# Patient Record
Sex: MALE | Race: WHITE | NOT HISPANIC OR LATINO | Employment: OTHER | URBAN - METROPOLITAN AREA
[De-identification: names, ages, dates, MRNs, and addresses within clinical notes are randomized per-mention and may not be internally consistent; named-entity substitution may affect disease eponyms.]

---

## 2017-01-04 ENCOUNTER — TELEPHONE (OUTPATIENT)
Dept: HEMATOLOGY/ONCOLOGY | Facility: CLINIC | Age: 82
End: 2017-01-04

## 2017-01-05 ENCOUNTER — DOCUMENTATION ONLY (OUTPATIENT)
Dept: HEMATOLOGY/ONCOLOGY | Facility: CLINIC | Age: 82
End: 2017-01-05

## 2017-01-05 ENCOUNTER — TELEPHONE (OUTPATIENT)
Dept: HEMATOLOGY/ONCOLOGY | Facility: CLINIC | Age: 82
End: 2017-01-05

## 2017-01-05 DIAGNOSIS — C90.00 MULTIPLE MYELOMA NOT HAVING ACHIEVED REMISSION: ICD-10-CM

## 2017-01-05 RX ORDER — LENALIDOMIDE 10 MG/1
CAPSULE ORAL
Qty: 21 EACH | Refills: 0 | Status: SHIPPED | OUTPATIENT
Start: 2017-01-05 | End: 2017-02-02 | Stop reason: SDUPTHER

## 2017-01-05 NOTE — TELEPHONE ENCOUNTER
Spoke with wife and advised medication was [facility].  They will contact her to deliver.

## 2017-01-05 NOTE — PROGRESS NOTES
Rec'd Revlimid refill rx from Dr. Isabel. Cycle change noted from 21-day cycle to 28-day cycle. Refill faxed to [facility]. Will follow to ensure receipt of refill by pt.

## 2017-01-09 ENCOUNTER — TELEPHONE (OUTPATIENT)
Dept: HEMATOLOGY/ONCOLOGY | Facility: CLINIC | Age: 82
End: 2017-01-09

## 2017-01-09 DIAGNOSIS — C90.00 MULTIPLE MYELOMA NOT HAVING ACHIEVED REMISSION: Primary | ICD-10-CM

## 2017-01-09 RX ORDER — OXYCODONE AND ACETAMINOPHEN 10; 325 MG/1; MG/1
1 TABLET ORAL EVERY 6 HOURS PRN
Qty: 60 TABLET | Refills: 0 | Status: SHIPPED | OUTPATIENT
Start: 2017-01-09 | End: 2017-01-09 | Stop reason: SDUPTHER

## 2017-01-09 RX ORDER — OXYCODONE AND ACETAMINOPHEN 10; 325 MG/1; MG/1
1 TABLET ORAL EVERY 6 HOURS PRN
Qty: 20 TABLET | Refills: 0 | Status: CANCELLED | OUTPATIENT
Start: 2017-01-09

## 2017-01-09 RX ORDER — OXYCODONE AND ACETAMINOPHEN 10; 325 MG/1; MG/1
1 TABLET ORAL EVERY 6 HOURS PRN
Qty: 60 TABLET | Refills: 0 | Status: SHIPPED | OUTPATIENT
Start: 2017-01-09 | End: 2017-03-31 | Stop reason: SDUPTHER

## 2017-01-09 NOTE — TELEPHONE ENCOUNTER
Advised patient's wife.   checked with the pharmacy The prescripiton is still in process.  They will send it out as soon as it goest through

## 2017-01-09 NOTE — TELEPHONE ENCOUNTER
Spoke to Jade at [facility] regarding status of pt's Revlimid. It is being processed and they will call the patient to set up shipment soon. Notified Kenisha with Dr. Isabel, who is working with pt post [facility] d/c. Further f/u based on needs.

## 2017-01-09 NOTE — TELEPHONE ENCOUNTER
----- Message from Simi Spicer sent at 1/9/2017 12:24 PM CST -----  Елена Roe (Spouse) is returning a call from nurse in regards to pt medication.          Please call pt back at [phone]

## 2017-01-11 ENCOUNTER — TELEPHONE (OUTPATIENT)
Dept: HEMATOLOGY/ONCOLOGY | Facility: CLINIC | Age: 82
End: 2017-01-11

## 2017-01-11 NOTE — TELEPHONE ENCOUNTER
Attempted to contact patient/spouse regarding Revlimid.  Preferred number rang and then went to a busy tone.  Left message on secondary phone number, but have never used that number to contact patient or spouse before.  I will try calling the pharmacy to see if they can confirm that they have received the medicine.

## 2017-01-11 NOTE — TELEPHONE ENCOUNTER
Attempted to contact patient's wife to check status of revlimid.  Unable to leave a message or reach patient, I will try to call back later.

## 2017-01-11 NOTE — TELEPHONE ENCOUNTER
Spoke with rep for [facility] who states patient needs to contact pharmacy and complete survey and schedule delivery of Revlimid.  Notified Dr. Isabel and staff in case they are able to contact the patient/spouse before I can.  FU as requested.

## 2017-01-11 NOTE — TELEPHONE ENCOUNTER
----- Message from Zheng Isabel MD sent at 1/11/2017  3:42 PM CST -----  2 a day tops.  Also,  found out the revlimid has not been sent because he has not filled the   Survey  He needs to do that.  He should have already done it befoe. If he needs help, call justin ISABEL

## 2017-01-12 ENCOUNTER — TELEPHONE (OUTPATIENT)
Dept: HEMATOLOGY/ONCOLOGY | Facility: CLINIC | Age: 82
End: 2017-01-12

## 2017-01-12 NOTE — TELEPHONE ENCOUNTER
Call to Mrs. Roe regarding Revlimid Rx.  Patient needs to contact [facility] pharmacy at [phone] to complete survey and schedule delivery of Revlimid.  Questions that will be asked are if the patient has been reminded not to donate blood, not to have sex with a woman who can become pregnant, or if he does have sex with a woman who can become pregnant, he should use a latex condom, and if the patient understands that having sex with a woman who could become pregnant could result in birth defects or even death of an unborn baby.      Unable to reach by phone, I will continue to try, and relay this information to Didi Blake LCSW and Kenisha Conley MA

## 2017-01-12 NOTE — TELEPHONE ENCOUNTER
----- Message from Zulema Reillyite sent at 1/12/2017  4:19 PM CST -----  Contact: Елена Roe (wife)   Елена called and stated she was returning a call regarding a survey. She can be reached at [phone].    Thanks,  TF

## 2017-01-16 ENCOUNTER — OFFICE VISIT (OUTPATIENT)
Dept: HEMATOLOGY/ONCOLOGY | Facility: CLINIC | Age: 82
End: 2017-01-16
Payer: MEDICARE

## 2017-01-16 ENCOUNTER — DOCUMENTATION ONLY (OUTPATIENT)
Dept: HEMATOLOGY/ONCOLOGY | Facility: CLINIC | Age: 82
End: 2017-01-16

## 2017-01-16 ENCOUNTER — LAB VISIT (OUTPATIENT)
Dept: LAB | Facility: HOSPITAL | Age: 82
End: 2017-01-16
Attending: INTERNAL MEDICINE
Payer: MEDICARE

## 2017-01-16 VITALS
SYSTOLIC BLOOD PRESSURE: 120 MMHG | WEIGHT: 223.56 LBS | RESPIRATION RATE: 18 BRPM | HEIGHT: 68 IN | DIASTOLIC BLOOD PRESSURE: 62 MMHG | BODY MASS INDEX: 33.88 KG/M2 | OXYGEN SATURATION: 90 % | HEART RATE: 77 BPM | TEMPERATURE: 98 F

## 2017-01-16 DIAGNOSIS — C90.00 MULTIPLE MYELOMA, REMISSION STATUS UNSPECIFIED: ICD-10-CM

## 2017-01-16 DIAGNOSIS — C90.00 MULTIPLE MYELOMA, REMISSION STATUS UNSPECIFIED: Primary | ICD-10-CM

## 2017-01-16 LAB
ALBUMIN SERPL BCP-MCNC: 3.1 G/DL
ALP SERPL-CCNC: 70 U/L
ALT SERPL W/O P-5'-P-CCNC: 12 U/L
ANION GAP SERPL CALC-SCNC: 12 MMOL/L
AST SERPL-CCNC: 17 U/L
BASOPHILS # BLD AUTO: 0.04 K/UL
BASOPHILS NFR BLD: 0.5 %
BILIRUB SERPL-MCNC: 0.3 MG/DL
BUN SERPL-MCNC: 10 MG/DL
CALCIUM SERPL-MCNC: 9 MG/DL
CHLORIDE SERPL-SCNC: 99 MMOL/L
CO2 SERPL-SCNC: 28 MMOL/L
CREAT SERPL-MCNC: 1 MG/DL
DIFFERENTIAL METHOD: ABNORMAL
EOSINOPHIL # BLD AUTO: 0.2 K/UL
EOSINOPHIL NFR BLD: 2.3 %
ERYTHROCYTE [DISTWIDTH] IN BLOOD BY AUTOMATED COUNT: 17.1 %
EST. GFR  (AFRICAN AMERICAN): >60 ML/MIN/1.73 M^2
EST. GFR  (NON AFRICAN AMERICAN): >60 ML/MIN/1.73 M^2
GLUCOSE SERPL-MCNC: 131 MG/DL
HCT VFR BLD AUTO: 31.4 %
HGB BLD-MCNC: 9.5 G/DL
IGA SERPL-MCNC: 491 MG/DL
IGG SERPL-MCNC: 915 MG/DL
IGM SERPL-MCNC: 106 MG/DL
LYMPHOCYTES # BLD AUTO: 1.6 K/UL
LYMPHOCYTES NFR BLD: 20.8 %
MCH RBC QN AUTO: 32.1 PG
MCHC RBC AUTO-ENTMCNC: 30.3 %
MCV RBC AUTO: 106 FL
MONOCYTES # BLD AUTO: 1.1 K/UL
MONOCYTES NFR BLD: 14.9 %
NEUTROPHILS # BLD AUTO: 4.6 K/UL
NEUTROPHILS NFR BLD: 61.5 %
PLATELET # BLD AUTO: 211 K/UL
PMV BLD AUTO: 8.9 FL
POTASSIUM SERPL-SCNC: 3.8 MMOL/L
PROT SERPL-MCNC: 6.6 G/DL
RBC # BLD AUTO: 2.96 M/UL
SODIUM SERPL-SCNC: 139 MMOL/L
WBC # BLD AUTO: 7.54 K/UL

## 2017-01-16 PROCEDURE — 99499 UNLISTED E&M SERVICE: CPT | Mod: S$GLB,,, | Performed by: INTERNAL MEDICINE

## 2017-01-16 PROCEDURE — 99999 PR PBB SHADOW E&M-EST. PATIENT-LVL V: CPT | Mod: PBBFAC,,, | Performed by: INTERNAL MEDICINE

## 2017-01-16 PROCEDURE — 99215 OFFICE O/P EST HI 40 MIN: CPT | Mod: S$GLB,,, | Performed by: INTERNAL MEDICINE

## 2017-01-16 PROCEDURE — 82784 ASSAY IGA/IGD/IGG/IGM EACH: CPT | Mod: 59

## 2017-01-16 PROCEDURE — 86334 IMMUNOFIX E-PHORESIS SERUM: CPT

## 2017-01-16 PROCEDURE — 3074F SYST BP LT 130 MM HG: CPT | Mod: S$GLB,,, | Performed by: INTERNAL MEDICINE

## 2017-01-16 PROCEDURE — 1157F ADVNC CARE PLAN IN RCRD: CPT | Mod: S$GLB,,, | Performed by: INTERNAL MEDICINE

## 2017-01-16 PROCEDURE — 3078F DIAST BP <80 MM HG: CPT | Mod: S$GLB,,, | Performed by: INTERNAL MEDICINE

## 2017-01-16 PROCEDURE — 1160F RVW MEDS BY RX/DR IN RCRD: CPT | Mod: S$GLB,,, | Performed by: INTERNAL MEDICINE

## 2017-01-16 PROCEDURE — 86334 IMMUNOFIX E-PHORESIS SERUM: CPT | Mod: 26,,, | Performed by: PATHOLOGY

## 2017-01-16 PROCEDURE — 80053 COMPREHEN METABOLIC PANEL: CPT | Mod: PO

## 2017-01-16 PROCEDURE — 1159F MED LIST DOCD IN RCRD: CPT | Mod: S$GLB,,, | Performed by: INTERNAL MEDICINE

## 2017-01-16 PROCEDURE — 85025 COMPLETE CBC W/AUTO DIFF WBC: CPT | Mod: PO

## 2017-01-16 PROCEDURE — 1125F AMNT PAIN NOTED PAIN PRSNT: CPT | Mod: S$GLB,,, | Performed by: INTERNAL MEDICINE

## 2017-01-16 PROCEDURE — 84165 PROTEIN E-PHORESIS SERUM: CPT

## 2017-01-16 PROCEDURE — 36415 COLL VENOUS BLD VENIPUNCTURE: CPT | Mod: PO

## 2017-01-16 PROCEDURE — 84165 PROTEIN E-PHORESIS SERUM: CPT | Mod: 26,,, | Performed by: PATHOLOGY

## 2017-01-16 RX ORDER — NITROGLYCERIN 0.4 MG/1
0.4 TABLET SUBLINGUAL EVERY 5 MIN PRN
Qty: 30 TABLET | Refills: 3 | Status: SHIPPED | OUTPATIENT
Start: 2017-01-16 | End: 2017-05-01 | Stop reason: SDUPTHER

## 2017-01-16 NOTE — PROGRESS NOTES
Check in with patient/wife at office visit with Dr. Isabel.  Mrs. Roe asked for records from when the patient was hospitalized at [facility], specifically cardiology reports.  I was able to print off cardiac testing performed while inpatient, and printed out notes from pulmonology.  I was not able to find notes from cardiology in the file.  Patient reports he is feeling better.  FU as seen in clinic, as requested.

## 2017-01-16 NOTE — PROGRESS NOTES
Subjective:       Patient ID: Lorne Roe is a 81 y.o. male.    Chief Complaint: Follow-up    HPI This is a 81 year-old  gentleman who comes today for follow up of his smoldering myeloma.  He is who known to us since 11/2008 because of mild anemia. Work up at that time showed vitamin B12 in the lower limits of normal. He was given vitamin B12 supplements once a month with improvement. In addition, a serum protein electrophoresis was reported as positive showing an IgA monoclonal spike of 1.18 g. Eventually it sisi to 1.3 g. He had x-rays of the skull and other bones all of which were negative. His UPEP was negative for a monoclonal spike. His chem 20 as normal and has remained with normal calcium, protein and creatinine tthorugh the years. He had a bone marrow aspiration and biopsy on 05/03/2010 because of his increase in the spike of serum protein electrophoresis. 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    Serum free light chains in 07/2009 were reported as increased mostly at the expense of the lambda chains. There were measured at 7.13. We felt that based on the absence of lytic lesions, hypercalcemia, renal failure or significant anemia, he had at best a smoldering myeloma that we could follow expectantly. \He has chronic back pain of the spine    Imaging studies on 03/17/2011 ordered by orthopedics showed some disk disease and degenerative joint disease but no evidence of myeloma.    He has seen dr Carmichael of physical medicine A repeat MRI of the LS spine on 8/2014 showed DJD changes and no obvious evidence of myeloma.  Back pain continues about the same      He has chronic SOB for which he follows up with dr Szymanski and is on home 02.  His   lab test in October 2016 show anemia of  10.8  calcium is normal, total Serum protein is 10.  SPEp now show a monoclonal spike of 4.82 gr with lambda free light chains at 108.  24 hour urine protei nhas a small monoclonal  pspike represetning 12% of the protein in the urine  He was asked to have a bone marrow. He was diagnosed as having active myeloma.    He just came out of the hospital after a prolonged series ofback to back admissions to the [facility], [facility], and the [facility].  He has responded nicely to the initial treatment  And is supposed to start the 2 nd cycle of Rev/dex over the next few days.  [facility] on this [facility].  He remains SOb on around the clock 02  ALLERGIES: see med card  MEDICATIONS: See Med Card.  PAST MEDICAL HISTORY:  1. Smoldering multiple myeloma.  2. Obesity.  3. Chronic back pain with degenerative disk disease.  4. Coronary artery disease, status post bypass and coronary stents and  previous heart attacks.  5. High blood pressure.  6. Elevated cholesterol.  7. Peripheral vascular disease -- carotid stenosis.  8. History of leg edema.  9. Arthritis.  10.COPD  PREVIOUS SURGERIES: Coronary bypass surgery in 2003. Facial surgery  following an accident many years ago. Lower back surgery in 1998.  SOCIAL HISTORY: He is  with no children. He lives in [place].  He smoked until 1997 averaging two packs a day. He did this for 50 years.  He used to drink up to two cases of beer per week but stopped in 1995. He  used to be in the [employer] and retired from it and also worked as a riverboat  captain for several years.                Family History   Problem Relation Age of Onset    Heart disease Father       Melanoma Neg Hx       Psoriasis Neg Hx       Lupus Neg Hx       Eczema Neg Hx                Review of Systems   Constitutional: Negative.  Negative for fatigue.   Eyes: Negative.    Respiratory:        Chronic sob   Cardiovascular: Negative.  Negative for chest pain.   Gastrointestinal: Negative for abdominal pain and nausea.   Genitourinary: Negative.  Negative for hematuria.   Musculoskeletal: Negative.         Chronic back pain   Skin:  Negative.    Neurological: Negative.    Psychiatric/Behavioral: Negative.        Objective:      Physical Exam   Constitutional: He is oriented to person, place, and time. He appears well-developed and well-nourished.   Alert, oriented, comes in a wheel chair   HENT:   Head: Normocephalic.   Mouth/Throat: No oropharyngeal exudate.   Eyes: Pupils are equal, round, and reactive to light.   Neck: No thyromegaly present.   Cardiovascular: Normal rate, regular rhythm and normal heart sounds.  Exam reveals no gallop.    No murmur heard.  Pulmonary/Chest: No respiratory distress. He has no wheezes. He has no rales.   Abdominal: Soft. Bowel sounds are normal. He exhibits no distension and no mass. There is no rebound and no guarding.   Musculoskeletal: Normal range of motion. He exhibits no edema.   Lymphadenopathy:     He has no cervical adenopathy.   Neurological: He is alert and oriented to person, place, and time.   Skin: Skin is warm and dry.   Psychiatric: He has a normal mood and affect. His behavior is normal.       Assessment:       1. Multiple myeloma, remission status unspecified      2-COPD  3-CHF    Plan:       Will have a cbc, cmp, free light chains and SPEp.  Will let him know when to start the rev and dex.  Follow up with Dr Szymanski regarding the OB and cardiology regarding angina and chf  Complex visit requiring discussing case with [facility] specialists and addressing multiple mediacl issues

## 2017-01-17 ENCOUNTER — TELEPHONE (OUTPATIENT)
Dept: PULMONOLOGY | Facility: CLINIC | Age: 82
End: 2017-01-17

## 2017-01-17 DIAGNOSIS — R06.02 SOB (SHORTNESS OF BREATH): Primary | ICD-10-CM

## 2017-01-17 LAB
ALBUMIN SERPL ELPH-MCNC: 3.2 G/DL
ALPHA1 GLOB SERPL ELPH-MCNC: 0.36 G/DL
ALPHA2 GLOB SERPL ELPH-MCNC: 0.89 G/DL
B-GLOBULIN SERPL ELPH-MCNC: 1.16 G/DL
GAMMA GLOB SERPL ELPH-MCNC: 0.89 G/DL
PATHOLOGIST INTERPRETATION SPE: NORMAL
PROT SERPL-MCNC: 6.5 G/DL

## 2017-01-17 NOTE — TELEPHONE ENCOUNTER
Spoke with patient's wife Елена, who states that patient is ok now.  Oxygen saturation levels were low.  Unsure if they were correct.   Offered appointment for tomorrow in am to which she declined due to other appointments.  Oxygen levels are now 96% with bipap on.  Will come in on 1/23/17 for appointment.

## 2017-01-17 NOTE — TELEPHONE ENCOUNTER
----- Message from Sandy Phillip sent at 1/17/2017 11:09 AM CST -----  Contact: Елена/wife  Елена stated that Home Health advise her to call in to let Dr. Szymanski know what the patient is doing, She stated that when he woke up this morning he was not able to breathe so she put him back to bed to put him back under his CPAP. She also stated that when he was on his CPAP his oxygen was 84 and without it was 45, Please call her at [phone].      Thanks  Td

## 2017-01-18 LAB — INTERPRETATION SERPL IFE-IMP: NORMAL

## 2017-01-18 NOTE — TELEPHONE ENCOUNTER
Done  The orders you are now placing are duplicates of the following open orders. Consider releasing an open order instead.  XR CHEST PA AND LATERAL [[id number]] is a future order, expected 10/5/17, placed by Shayan Szymanski MD on 10/5/16  Click here to update recipient lists

## 2017-01-19 ENCOUNTER — TELEPHONE (OUTPATIENT)
Dept: HEMATOLOGY/ONCOLOGY | Facility: CLINIC | Age: 82
End: 2017-01-19

## 2017-01-19 DIAGNOSIS — C90.00 MULTIPLE MYELOMA, REMISSION STATUS UNSPECIFIED: Primary | ICD-10-CM

## 2017-01-19 RX ORDER — DEXAMETHASONE 4 MG/1
TABLET ORAL
Qty: 40 TABLET | Refills: 6 | Status: SHIPPED | OUTPATIENT
Start: 2017-01-19 | End: 2017-01-19 | Stop reason: SDUPTHER

## 2017-01-19 RX ORDER — DEXAMETHASONE 4 MG/1
TABLET ORAL
Qty: 40 TABLET | Refills: 6 | Status: SHIPPED | OUTPATIENT
Start: 2017-01-19 | End: 2017-03-01 | Stop reason: SDUPTHER

## 2017-01-19 NOTE — TELEPHONE ENCOUNTER
----- Message from Michell Fisher sent at 1/19/2017 10:06 AM CST -----  Contact: pt   .Pt was returning nurse call     ..[phone] (home) [phone] (work)

## 2017-01-19 NOTE — TELEPHONE ENCOUNTER
Send prescripiton to [facility]  [facility].  Do you need blood work?

## 2017-01-20 ENCOUNTER — DOCUMENTATION ONLY (OUTPATIENT)
Dept: HEMATOLOGY/ONCOLOGY | Facility: CLINIC | Age: 82
End: 2017-01-20

## 2017-01-20 NOTE — PROGRESS NOTES
Mrs Roe called today saying he had developed swelling of the leg, with redness and blisters. She was asked to take patient to the ER at [facility]

## 2017-01-20 NOTE — TELEPHONE ENCOUNTER
Wife called and said patient's leg was swollen with blister and warm to touch.  Dr Isabel spoke with wife.  Patient is to go to ER.

## 2017-01-20 NOTE — TELEPHONE ENCOUNTER
----- Message from Sheron Blake sent at 1/20/2017  7:59 AM CST -----  Contact: Елена/wife  Елена called to speak with the nurse; she stated that the patient's legs are swollen and she thinks he has an infection (she stated he had it in the hospital) and she would like an antibiotic called in for him. - she stated that Dr. Isabel knows the name of the medication.    [facility] [address].  [address] [place], [place] [address].  [address]  [facility] [address]  Phone: [phone] Fax: [phone]    She can be contacted at [phone].    Thanks,  Sheron

## 2017-01-24 LAB — PATHOLOGIST INTERPRETATION IFE: NORMAL

## 2017-01-25 ENCOUNTER — HOSPITAL ENCOUNTER (OUTPATIENT)
Dept: RADIOLOGY | Facility: HOSPITAL | Age: 82
Discharge: HOME | End: 2017-01-25
Attending: INTERNAL MEDICINE
Payer: MEDICARE

## 2017-01-25 ENCOUNTER — OFFICE VISIT (OUTPATIENT)
Dept: PULMONOLOGY | Facility: CLINIC | Age: 82
End: 2017-01-25
Payer: MEDICARE

## 2017-01-25 VITALS
OXYGEN SATURATION: 93 % | WEIGHT: 224.19 LBS | BODY MASS INDEX: 33.98 KG/M2 | SYSTOLIC BLOOD PRESSURE: 104 MMHG | RESPIRATION RATE: 20 BRPM | DIASTOLIC BLOOD PRESSURE: 54 MMHG | HEIGHT: 68 IN | HEART RATE: 75 BPM

## 2017-01-25 DIAGNOSIS — F32.A ANXIETY AND DEPRESSION: ICD-10-CM

## 2017-01-25 DIAGNOSIS — N19 RENAL FAILURE SYNDROME: ICD-10-CM

## 2017-01-25 DIAGNOSIS — J44.9 CHRONIC OBSTRUCTIVE PULMONARY DISEASE, UNSPECIFIED COPD TYPE: ICD-10-CM

## 2017-01-25 DIAGNOSIS — R60.0 LOCALIZED EDEMA: Primary | ICD-10-CM

## 2017-01-25 DIAGNOSIS — R06.02 SOB (SHORTNESS OF BREATH): ICD-10-CM

## 2017-01-25 DIAGNOSIS — R21 RASH: ICD-10-CM

## 2017-01-25 DIAGNOSIS — F41.9 ANXIETY AND DEPRESSION: ICD-10-CM

## 2017-01-25 PROCEDURE — 99215 OFFICE O/P EST HI 40 MIN: CPT | Mod: S$GLB,,, | Performed by: INTERNAL MEDICINE

## 2017-01-25 PROCEDURE — 71020 XR CHEST PA AND LATERAL: CPT | Mod: 26,,, | Performed by: RADIOLOGY

## 2017-01-25 PROCEDURE — 1125F AMNT PAIN NOTED PAIN PRSNT: CPT | Mod: S$GLB,,, | Performed by: INTERNAL MEDICINE

## 2017-01-25 PROCEDURE — 1159F MED LIST DOCD IN RCRD: CPT | Mod: S$GLB,,, | Performed by: INTERNAL MEDICINE

## 2017-01-25 PROCEDURE — 99499 UNLISTED E&M SERVICE: CPT | Mod: S$GLB,,, | Performed by: INTERNAL MEDICINE

## 2017-01-25 PROCEDURE — 99999 PR PBB SHADOW E&M-EST. PATIENT-LVL IV: CPT | Mod: PBBFAC,,, | Performed by: INTERNAL MEDICINE

## 2017-01-25 PROCEDURE — 3078F DIAST BP <80 MM HG: CPT | Mod: S$GLB,,, | Performed by: INTERNAL MEDICINE

## 2017-01-25 PROCEDURE — 1157F ADVNC CARE PLAN IN RCRD: CPT | Mod: S$GLB,,, | Performed by: INTERNAL MEDICINE

## 2017-01-25 PROCEDURE — 3074F SYST BP LT 130 MM HG: CPT | Mod: S$GLB,,, | Performed by: INTERNAL MEDICINE

## 2017-01-25 PROCEDURE — 1160F RVW MEDS BY RX/DR IN RCRD: CPT | Mod: S$GLB,,, | Performed by: INTERNAL MEDICINE

## 2017-01-25 RX ORDER — IPRATROPIUM BROMIDE AND ALBUTEROL SULFATE 2.5; .5 MG/3ML; MG/3ML
3 SOLUTION RESPIRATORY (INHALATION)
Qty: 360 VIAL | Refills: 3 | Status: SHIPPED | OUTPATIENT
Start: 2017-01-25 | End: 2017-10-26 | Stop reason: SDUPTHER

## 2017-01-25 RX ORDER — IPRATROPIUM BROMIDE 42 UG/1
2 SPRAY, METERED NASAL
COMMUNITY
End: 2017-02-02

## 2017-01-25 RX ORDER — SPIRONOLACTONE 50 MG/1
50 TABLET, FILM COATED ORAL 2 TIMES DAILY
Qty: 180 TABLET | Refills: 4 | Status: SHIPPED | OUTPATIENT
Start: 2017-01-25 | End: 2017-02-02

## 2017-01-25 RX ORDER — PROMETHAZINE HYDROCHLORIDE AND DEXTROMETHORPHAN HYDROBROMIDE 6.25; 15 MG/5ML; MG/5ML
SYRUP ORAL
COMMUNITY
End: 2017-05-01 | Stop reason: SDUPTHER

## 2017-01-25 RX ORDER — ISOSORBIDE MONONITRATE 30 MG/1
30 TABLET, EXTENDED RELEASE ORAL
COMMUNITY
End: 2017-03-17

## 2017-01-25 RX ORDER — FLUTICASONE PROPIONATE AND SALMETEROL 250; 50 UG/1; UG/1
1 POWDER RESPIRATORY (INHALATION) 2 TIMES DAILY
Qty: 180 EACH | Refills: 3 | Status: SHIPPED | OUTPATIENT
Start: 2017-01-25 | End: 2018-03-21 | Stop reason: ALTCHOICE

## 2017-01-25 RX ORDER — MELOXICAM 7.5 MG/1
7.5 TABLET ORAL
COMMUNITY
End: 2018-03-21 | Stop reason: ALTCHOICE

## 2017-01-25 RX ORDER — HYDROCODONE BITARTRATE AND ACETAMINOPHEN 10; 325 MG/1; MG/1
1 TABLET ORAL
COMMUNITY
End: 2017-02-02

## 2017-01-25 RX ORDER — BUPROPION HYDROCHLORIDE 75 MG/1
75 TABLET ORAL DAILY
Qty: 90 TABLET | Refills: 3 | Status: SHIPPED | OUTPATIENT
Start: 2017-01-25 | End: 2018-01-25

## 2017-01-25 RX ORDER — HYDROXYZINE HYDROCHLORIDE 10 MG/1
10 TABLET, FILM COATED ORAL
COMMUNITY
End: 2017-01-25 | Stop reason: SDUPTHER

## 2017-01-25 RX ORDER — CLONAZEPAM 0.25 MG/1
0.25 TABLET, ORALLY DISINTEGRATING ORAL 2 TIMES DAILY
Qty: 180 TABLET | Refills: 3 | Status: SHIPPED | OUTPATIENT
Start: 2017-01-25 | End: 2017-03-17 | Stop reason: DRUGHIGH

## 2017-01-25 RX ORDER — HYDROXYZINE HYDROCHLORIDE 10 MG/1
10 TABLET, FILM COATED ORAL 3 TIMES DAILY PRN
Qty: 100 TABLET | Refills: 11 | Status: SHIPPED | OUTPATIENT
Start: 2017-01-25 | End: 2017-03-17

## 2017-01-25 RX ORDER — FUROSEMIDE 40 MG/1
40 TABLET ORAL 2 TIMES DAILY
Qty: 180 TABLET | Refills: 4 | Status: SHIPPED | OUTPATIENT
Start: 2017-01-25 | End: 2018-03-21 | Stop reason: SDUPTHER

## 2017-01-25 RX ORDER — GABAPENTIN 600 MG/1
600 TABLET ORAL
COMMUNITY
End: 2017-01-25

## 2017-01-25 RX ORDER — IPRATROPIUM BROMIDE AND ALBUTEROL SULFATE 2.5; .5 MG/3ML; MG/3ML
3 SOLUTION RESPIRATORY (INHALATION)
COMMUNITY
End: 2017-01-25 | Stop reason: SDUPTHER

## 2017-01-25 RX ORDER — ALBUTEROL SULFATE 90 UG/1
2 AEROSOL, METERED RESPIRATORY (INHALATION) EVERY 6 HOURS PRN
Qty: 3 INHALER | Refills: 3 | Status: SHIPPED | OUTPATIENT
Start: 2017-01-25 | End: 2018-03-21 | Stop reason: SDUPTHER

## 2017-01-25 RX ORDER — SPIRONOLACTONE 50 MG/1
50 TABLET, FILM COATED ORAL
COMMUNITY
End: 2017-01-25 | Stop reason: SDUPTHER

## 2017-01-25 NOTE — PROGRESS NOTES
Subjective:       Patient ID: Lorne Roe is a 82 y.o. male.    Chief Complaint: He       COPD    HPI     Overall ilmproved since last visit  Less sputum p[roduction  Legs swelling up intermitteltly  Trouble breathing when exercising      COPD  He presents for evaluation and treatment of COPD. The patient is not currently have symptoms / an exacerbation. The patient has COPD for approximately 10 years. Symptoms in previous episodes have included dyspnea, cough, wheezing and fatigue, and typically last 2 weeks. Previous episodes have been exacerbated by any exercise. Current treatment includes albuterol nebulizer, which generally provides some relief of symptoms.  He uses 1 pillows at night. Patient currently is on oxygen at 3 L/min per nasal cannula.. The patient is having no constitutional symptoms, denying fever, chills, anorexia, or weight loss. The patient has been hospitalized for this condition before. He quit smoking approximately years years ago. The patient is experiencing exercise intolerance (difficulty walking 2 blocks on flat ground and difficulty climbing 2 flights of stairs).    Obstructive Sleep Apnea:  Patient is using CPAP as prescribed and benefiting from therapy.    Patient has complaints of none    Seen by Dr. Cristina in hospital      Past Medical History   Diagnosis Date    B12 deficiency anemia     COPD (chronic obstructive pulmonary disease)     Coronary artery disease      [facility]    Depression     Hypertension     Multiple myeloma     Osteopenia     Paraproteinemia      Dr alvarado    PVD (peripheral vascular disease)      Dr Argueta    Vitamin D deficiency disease      Past Surgical History   Procedure Laterality Date    Back surgery      Neck surgery      Coronary artery bypass graft       stent/eren multi-link duet-safe to 1.5T    Coronary angioplasty with stent placement      Carotid endarterectomy Left 12/17/13      left     Social History     Social History    Marital status:      Spouse name: N/A    Number of children: 0    Years of education: N/A     Occupational History    Not on file.     Social History Main Topics    Smoking status: Former Smoker    Smokeless tobacco: Never Used    Alcohol use No    Drug use: No    Sexual activity: No     Other Topics Concern    Not on file     Social History Narrative    Lives with spouse at home, disabled since 2003     Review of Systems   Constitutional: Positive for fatigue. Negative for fever.   HENT: Positive for postnasal drip and rhinorrhea. Negative for congestion.    Respiratory: Positive for cough, sputum production, shortness of breath, dyspnea on extertion, use of rescue inhaler and Paroxysmal Nocturnal Dyspnea.    Cardiovascular: Positive for palpitations and leg swelling. Negative for chest pain.   Musculoskeletal: Positive for arthralgias and back pain.   Skin: Negative for rash.   Gastrointestinal: Negative for nausea and abdominal pain.   Neurological: Positive for weakness. Negative for dizziness, syncope and light-headedness.   Hematological: Negative for adenopathy. Does not bruise/bleed easily.   Psychiatric/Behavioral: Positive for sleep disturbance. The patient is nervous/anxious.        Objective:      Physical Exam   Constitutional: He is oriented to person, place, and time. He appears well-developed and well-nourished.   HENT:   Head: Normocephalic and atraumatic.   Mouth/Throat: Oropharyngeal exudate present.   Eyes: Conjunctivae are normal. Pupils are equal, round, and reactive to light.   Neck: Neck supple. No JVD present. No tracheal deviation present. No thyromegaly present.   Cardiovascular: Normal rate.  A regularly irregular rhythm present.   Murmur heard.   Systolic murmur is present with a grade of 2/6   Pulmonary/Chest: Effort normal. He has decreased breath sounds. He has wheezes in the right lower field and the left lower field. He  has no rhonchi. He has no rales.   Abdominal: Soft. Bowel sounds are normal.   Musculoskeletal: He exhibits no edema or tenderness.   Decreased range of motion of back.     Lymphadenopathy:     He has no cervical adenopathy.   Neurological: He is alert and oriented to person, place, and time. He displays abnormal reflex.   Skin: Skin is warm and dry.   Nursing note and vitals reviewed.    Personal Diagnostic Review  Chest x-ray: cardiomegaly    No flowsheet data found.      Assessment:       1. Localized edema    2. Chronic obstructive pulmonary disease, unspecified COPD type    3. Anxiety and depression    4. Rash    5. Renal failure syndrome        Outpatient Encounter Prescriptions as of 1/25/2017   Medication Sig Dispense Refill    acyclovir (ZOVIRAX) 400 MG tablet Take 1 tablet (400 mg total) by mouth 2 (two) times daily. 60 tablet 0    albuterol 90 mcg/actuation inhaler Inhale 2 puffs into the lungs every 6 (six) hours as needed for Shortness of Breath. 3 Inhaler 3    albuterol-ipratropium 2.5mg-0.5mg/3mL (DUO-NEB) 0.5 mg-3 mg(2.5 mg base)/3 mL nebulizer solution Take 3 mLs by nebulization every 6 (six) hours while awake. 360 vial 3    ammonium lactate (AMLACTIN) 12 % lotion Use daily.  Apply to damp skin after bathing. 225 g 11    aspirin (ECOTRIN) 81 MG EC tablet Take 1 tablet (81 mg total) by mouth once daily. 30 tablet 0    atenolol (TENORMIN) 25 MG tablet Take 0.5 tablets (12.5 mg total) by mouth once daily. (Patient taking differently: Take 50 mg by mouth once daily. ) 15 tablet 0    buPROPion (WELLBUTRIN) 75 MG tablet Take 1 tablet (75 mg total) by mouth once daily. 90 tablet 3    clonazePAM (KLONOPIN) 0.25 MG TbDL Take 1 tablet (0.25 mg total) by mouth 2 (two) times daily. 180 tablet 3    clopidogrel (PLAVIX) 75 mg tablet Take 1 tablet (75 mg total) by mouth once daily. 30 tablet 0    clotrimazole (LOTRIMIN) 1 % cream as needed.   0    cyanocobalamin 1,000 mcg/mL injection Inject 1 mL (1,000  mcg total) into the muscle every 30 days. 10 mL 1    dexamethasone (DECADRON) 4 MG Tab 5 tablets po bid one day a week every week 40 tablet 6    docusate sodium (COLACE) 100 MG capsule Take 1 capsule (100 mg total) by mouth 2 (two) times daily. 60 capsule 0    ergocalciferol (VITAMIN D2) 50,000 unit Cap TAKE 1 CAPSULE EVERY 7 DAYS 4 capsule 0    fluticasone (FLONASE) 50 mcg/actuation nasal spray 1 spray by Each Nare route once daily. 1 Bottle 0    fluticasone-salmeterol 250-50 mcg/dose (ADVAIR DISKUS) 250-50 mcg/dose diskus inhaler Inhale 1 puff into the lungs 2 (two) times daily. 180 each 3    furosemide (LASIX) 40 MG tablet Take 1 tablet (40 mg total) by mouth 2 (two) times daily. 180 tablet 4    hydrocodone-acetaminophen 10-325mg (NORCO)  mg Tab Take 1 tablet by mouth.      hydrOXYzine HCl (ATARAX) 10 MG Tab Take 1 tablet (10 mg total) by mouth 3 (three) times daily as needed. 100 tablet 11    ipratropium (ATROVENT) 0.06 % nasal spray 2 sprays by Nasal route.      isosorbide mononitrate (IMDUR) 30 MG 24 hr tablet Take 30 mg by mouth.      lenalidomide (REVLIMID) 10 mg Cap One tablet daily from day 1 to day 21 then 7 days off 21 each 0    levothyroxine (SYNTHROID) 25 MCG tablet Take 1 tablet (25 mcg total) by mouth once daily. 30 tablet 0    magnesium oxide (MAG-OX) 400 mg tablet Take 1 tablet (400 mg total) by mouth 2 (two) times daily. 60 tablet 0    meloxicam (MOBIC) 7.5 MG tablet Take 7.5 mg by mouth.      midodrine (PROAMATINE) 2.5 MG Tab Take 2 tablets (5 mg total) by mouth 2 (two) times daily with meals. 60 tablet 0    nitroGLYCERIN (NITROSTAT) 0.4 MG SL tablet Place 1 tablet (0.4 mg total) under the tongue every 5 (five) minutes as needed. 30 tablet 3    nystatin (MYCOSTATIN) cream Apply topically 2 (two) times daily. 30 g 0    nystatin (MYCOSTATIN) powder Apply topically 2 (two) times daily as needed. 30 g 0    ondansetron (ZOFRAN) 4 MG tablet Take 1 tablet (4 mg total) by mouth  every 8 (eight) hours as needed for Nausea. 20 tablet 0    oxycodone-acetaminophen (PERCOCET)  mg per tablet Take 1 tablet by mouth every 6 (six) hours as needed for Pain. 60 tablet 0    pantoprazole (PROTONIX) 40 MG tablet Take 1 tablet (40 mg total) by mouth once daily. 30 tablet 0    promethazine-dextromethorphan (PROMETHAZINE-DM) 6.25-15 mg/5 mL Syrp Take by mouth.      rosuvastatin (CRESTOR) 10 MG tablet Take 1 tablet (10 mg total) by mouth every evening. 30 tablet 0    spironolactone (ALDACTONE) 50 MG tablet Take 1 tablet (50 mg total) by mouth 2 (two) times daily. 180 tablet 4    tamsulosin (FLOMAX) 0.4 mg Cp24 Take 1 capsule (0.4 mg total) by mouth once daily. 30 capsule 0    [DISCONTINUED] albuterol 90 mcg/actuation inhaler Inhale 2 puffs into the lungs every 6 (six) hours as needed for Shortness of Breath. 1 Inhaler 0    [DISCONTINUED] albuterol-ipratropium 2.5mg-0.5mg/3mL (DUO-NEB) 0.5 mg-3 mg(2.5 mg base)/3 mL nebulizer solution 3 mLs.      [DISCONTINUED] buPROPion (WELLBUTRIN) 75 MG tablet Take 1 tablet (75 mg total) by mouth once daily. 30 tablet 0    [DISCONTINUED] clonazePAM (KLONOPIN) 0.25 MG TbDL Take 1 tablet (0.25 mg total) by mouth 2 (two) times daily. 30 tablet 0    [DISCONTINUED] fluticasone-salmeterol 250-50 mcg/dose (ADVAIR DISKUS) 250-50 mcg/dose diskus inhaler Inhale 1 puff into the lungs 2 (two) times daily. 1 each 0    [DISCONTINUED] furosemide (LASIX) 40 MG tablet Take 1 tablet (40 mg total) by mouth once daily. 30 tablet 0    [DISCONTINUED] hydrOXYzine HCl (ATARAX) 10 MG Tab Take 10 mg by mouth.      [DISCONTINUED] spironolactone (ALDACTONE) 50 MG tablet Take 50 mg by mouth.      [DISCONTINUED] gabapentin (NEURONTIN) 600 MG tablet Take 600 mg by mouth.       No facility-administered encounter medications on file as of 1/25/2017.      Orders Placed This Encounter   Procedures    X-Ray Chest PA And Lateral     Standing Status:   Future     Standing Expiration Date:    1/25/2019     Order Specific Question:   Reason for Exam:     Answer:   SOB    Ambulatory Referral to Nephrology     Referral Priority:   Routine     Referral Type:   Consultation     Referral Reason:   Specialty Services Required     Requested Specialty:   Nephrology     Number of Visits Requested:   1     Plan:       Requested Prescriptions     Signed Prescriptions Disp Refills    furosemide (LASIX) 40 MG tablet 180 tablet 4     Sig: Take 1 tablet (40 mg total) by mouth 2 (two) times daily.    fluticasone-salmeterol 250-50 mcg/dose (ADVAIR DISKUS) 250-50 mcg/dose diskus inhaler 180 each 3     Sig: Inhale 1 puff into the lungs 2 (two) times daily.    spironolactone (ALDACTONE) 50 MG tablet 180 tablet 4     Sig: Take 1 tablet (50 mg total) by mouth 2 (two) times daily.    albuterol-ipratropium 2.5mg-0.5mg/3mL (DUO-NEB) 0.5 mg-3 mg(2.5 mg base)/3 mL nebulizer solution 360 vial 3     Sig: Take 3 mLs by nebulization every 6 (six) hours while awake.    albuterol 90 mcg/actuation inhaler 3 Inhaler 3     Sig: Inhale 2 puffs into the lungs every 6 (six) hours as needed for Shortness of Breath.    buPROPion (WELLBUTRIN) 75 MG tablet 90 tablet 3     Sig: Take 1 tablet (75 mg total) by mouth once daily.    clonazePAM (KLONOPIN) 0.25 MG TbDL 180 tablet 3     Sig: Take 1 tablet (0.25 mg total) by mouth 2 (two) times daily.    hydrOXYzine HCl (ATARAX) 10 MG Tab 100 tablet 11     Sig: Take 1 tablet (10 mg total) by mouth 3 (three) times daily as needed.     Localized edema  -     furosemide (LASIX) 40 MG tablet; Take 1 tablet (40 mg total) by mouth 2 (two) times daily.  Dispense: 180 tablet; Refill: 4  -     spironolactone (ALDACTONE) 50 MG tablet; Take 1 tablet (50 mg total) by mouth 2 (two) times daily.  Dispense: 180 tablet; Refill: 4    Chronic obstructive pulmonary disease, unspecified COPD type  -     fluticasone-salmeterol 250-50 mcg/dose (ADVAIR DISKUS) 250-50 mcg/dose diskus inhaler; Inhale 1 puff into the  lungs 2 (two) times daily.  Dispense: 180 each; Refill: 3  -     albuterol-ipratropium 2.5mg-0.5mg/3mL (DUO-NEB) 0.5 mg-3 mg(2.5 mg base)/3 mL nebulizer solution; Take 3 mLs by nebulization every 6 (six) hours while awake.  Dispense: 360 vial; Refill: 3  -     albuterol 90 mcg/actuation inhaler; Inhale 2 puffs into the lungs every 6 (six) hours as needed for Shortness of Breath.  Dispense: 3 Inhaler; Refill: 3  -     X-Ray Chest PA And Lateral; Future    Anxiety and depression  -     buPROPion (WELLBUTRIN) 75 MG tablet; Take 1 tablet (75 mg total) by mouth once daily.  Dispense: 90 tablet; Refill: 3  -     clonazePAM (KLONOPIN) 0.25 MG TbDL; Take 1 tablet (0.25 mg total) by mouth 2 (two) times daily.  Dispense: 180 tablet; Refill: 3    Rash  -     hydrOXYzine HCl (ATARAX) 10 MG Tab; Take 1 tablet (10 mg total) by mouth 3 (three) times daily as needed.  Dispense: 100 tablet; Refill: 11    Renal failure syndrome  -     Ambulatory Referral to Nephrology         Return in about 6 months (around 7/25/2017) for cxr.    MEDICAL DECISION MAKING: Moderate to high complexity.  Overall, the multiple problems listed are of moderate to high severity that may impact quality of life and activities of daily living. Side effects of medications, treatment plan as well as options and alternatives reviewed and discussed with patient. There was counseling of patient concerning these issues.    Total time spent in face to face counseling and coordination of care - 40  minutes over 50% of time was used in discussion of prognosis, risks, benefits of treatment, instructions and compliance with regimen . Discussion with other physicians or health care providers (DME, NP, pharmacy, respiratory therapy) occurred.

## 2017-01-25 NOTE — MR AVS SNAPSHOT
[facility]  [address] [place] [address]  Phone: [phone]  Fax: [phone]                  Lorne Roe   2017 11:00 AM   Office Visit    Description:  Male : 1935   Provider:  Shayan Szymanski MD   Department:  [facility]           Reason for Visit     COPD           Diagnoses this Visit        Comments    Localized edema    -  Primary     Chronic obstructive pulmonary disease, unspecified COPD type         Anxiety and depression         Rash         Renal failure syndrome                To Do List           Future Appointments        Provider Department Dept Phone    2017 11:05 AM LABORATORY, [facility] [phone]    2017 11:20 AM Zheng Isabel MD [facility] Hematology Oncology [phone]    4/10/2017 10:20 AM PULMONARY LAB, [facility]- Pulmonary Function [facility] [phone]    4/10/2017 11:40 AM Shayan Szymanski MD [facility] [phone]      Goals (5 Years of Data)     None      Follow-Up and Disposition     Return in about 6 months (around 2017) for cxr.       These Medications        Disp Refills Start End    furosemide (LASIX) 40 MG tablet 180 tablet 4 2017     Take 1 tablet (40 mg total) by mouth 2 (two) times daily. - Oral    Pharmacy: [address] Ph #: [phone]       fluticasone-salmeterol 250-50 mcg/dose (ADVAIR DISKUS) 250-50 mcg/dose diskus inhaler 180 each 3 2017    Inhale 1 puff into the lungs 2 (two) times daily. - Inhalation    Pharmacy: [facility] [address] Ph #: [phone]       spironolactone (ALDACTONE) 50 MG tablet 180 tablet 4 2017     Take 1 tablet (50 mg total) by mouth 2 (two) times daily. - Oral    Pharmacy: [facility] [address] Ph #: [phone]       albuterol-ipratropium 2.5mg-0.5mg/3mL (DUO-NEB) 0.5 mg-3 mg(2.5 mg base)/3  mL nebulizer solution 360 vial 3 1/25/2017     Take 3 mLs by nebulization every 6 (six) hours while awake. - Nebulization    Pharmacy: [address] Ph #: [phone]       albuterol 90 mcg/actuation inhaler 3 Inhaler 3 1/25/2017     Inhale 2 puffs into the lungs every 6 (six) hours as needed for Shortness of Breath. - Inhalation    Pharmacy: [address] Ph #: [phone]       buPROPion (WELLBUTRIN) 75 MG tablet 90 tablet 3 1/25/2017 1/25/2018    Take 1 tablet (75 mg total) by mouth once daily. - Oral    Pharmacy: [address] Ph #: [phone]       clonazePAM (KLONOPIN) 0.25 MG TbDL 180 tablet 3 1/25/2017 1/25/2018    Take 1 tablet (0.25 mg total) by mouth 2 (two) times daily. - Oral    Pharmacy: [address] Ph #: [phone]       hydrOXYzine HCl (ATARAX) 10 MG Tab 100 tablet 11 1/25/2017     Take 1 tablet (10 mg total) by mouth 3 (three) times daily as needed. - Oral    Pharmacy: [address] Ph #: [phone]         [facility]     [facility] Line - 24/7 Assistance  Registered nurses in the [facility] provide clinical advisement, health education, appointment booking, and other advisory services.  Call for this free service at [phone].             Medications           Message regarding Medications     Verify the changes and/or additions to your medication regime listed below are the same as discussed with your clinician today.  If any of these changes or additions are incorrect, please notify your healthcare provider.        START taking these NEW medications        Refills    spironolactone (ALDACTONE) 50 MG tablet 4    Sig: Take 1 tablet (50 mg total) by mouth 2 (two) times daily.    Class: Print    Route: Oral    albuterol-ipratropium 2.5mg-0.5mg/3mL (DUO-NEB) 0.5 mg-3  mg(2.5 mg base)/3 mL nebulizer solution 3    Sig: Take 3 mLs by nebulization every 6 (six) hours while awake.    Class: Print    Route: Nebulization    hydrOXYzine HCl (ATARAX) 10 MG Tab 11    Sig: Take 1 tablet (10 mg total) by mouth 3 (three) times daily as needed.    Class: Print    Route: Oral      CHANGE how you are taking these medications     Start Taking Instead of    furosemide (LASIX) 40 MG tablet furosemide (LASIX) 40 MG tablet    Dosage:  Take 1 tablet (40 mg total) by mouth 2 (two) times daily. Dosage:  Take 1 tablet (40 mg total) by mouth once daily.    Reason for Change:  Reorder       STOP taking these medications     gabapentin (NEURONTIN) 600 MG tablet Take 600 mg by mouth.           Verify that the below list of medications is an accurate representation of the medications you are currently taking.  If none reported, the list may be blank. If incorrect, please contact your healthcare provider. Carry this list with you in case of emergency.           Current Medications     acyclovir (ZOVIRAX) 400 MG tablet Take 1 tablet (400 mg total) by mouth 2 (two) times daily.    albuterol 90 mcg/actuation inhaler Inhale 2 puffs into the lungs every 6 (six) hours as needed for Shortness of Breath.    albuterol-ipratropium 2.5mg-0.5mg/3mL (DUO-NEB) 0.5 mg-3 mg(2.5 mg base)/3 mL nebulizer solution Take 3 mLs by nebulization every 6 (six) hours while awake.    ammonium lactate (AMLACTIN) 12 % lotion Use daily.  Apply to damp skin after bathing.    aspirin (ECOTRIN) 81 MG EC tablet Take 1 tablet (81 mg total) by mouth once daily.    atenolol (TENORMIN) 25 MG tablet Take 0.5 tablets (12.5 mg total) by mouth once daily.    buPROPion (WELLBUTRIN) 75 MG tablet Take 1 tablet (75 mg total) by mouth once daily.    clonazePAM (KLONOPIN) 0.25 MG TbDL Take 1 tablet (0.25 mg total) by mouth 2 (two) times daily.    clopidogrel (PLAVIX) 75 mg tablet Take 1 tablet (75 mg total) by mouth once daily.    clotrimazole (LOTRIMIN) 1 %  cream as needed.     cyanocobalamin 1,000 mcg/mL injection Inject 1 mL (1,000 mcg total) into the muscle every 30 days.    dexamethasone (DECADRON) 4 MG Tab 5 tablets po bid one day a week every week    docusate sodium (COLACE) 100 MG capsule Take 1 capsule (100 mg total) by mouth 2 (two) times daily.    ergocalciferol (VITAMIN D2) 50,000 unit Cap TAKE 1 CAPSULE EVERY 7 DAYS    fluticasone (FLONASE) 50 mcg/actuation nasal spray 1 spray by Each Nare route once daily.    fluticasone-salmeterol 250-50 mcg/dose (ADVAIR DISKUS) 250-50 mcg/dose diskus inhaler Inhale 1 puff into the lungs 2 (two) times daily.    furosemide (LASIX) 40 MG tablet Take 1 tablet (40 mg total) by mouth 2 (two) times daily.    hydrocodone-acetaminophen 10-325mg (NORCO)  mg Tab Take 1 tablet by mouth.    hydrOXYzine HCl (ATARAX) 10 MG Tab Take 1 tablet (10 mg total) by mouth 3 (three) times daily as needed.    ipratropium (ATROVENT) 0.06 % nasal spray 2 sprays by Nasal route.    isosorbide mononitrate (IMDUR) 30 MG 24 hr tablet Take 30 mg by mouth.    lenalidomide (REVLIMID) 10 mg Cap One tablet daily from day 1 to day 21 then 7 days off    levothyroxine (SYNTHROID) 25 MCG tablet Take 1 tablet (25 mcg total) by mouth once daily.    magnesium oxide (MAG-OX) 400 mg tablet Take 1 tablet (400 mg total) by mouth 2 (two) times daily.    meloxicam (MOBIC) 7.5 MG tablet Take 7.5 mg by mouth.    midodrine (PROAMATINE) 2.5 MG Tab Take 2 tablets (5 mg total) by mouth 2 (two) times daily with meals.    nitroGLYCERIN (NITROSTAT) 0.4 MG SL tablet Place 1 tablet (0.4 mg total) under the tongue every 5 (five) minutes as needed.    nystatin (MYCOSTATIN) cream Apply topically 2 (two) times daily.    nystatin (MYCOSTATIN) powder Apply topically 2 (two) times daily as needed.    ondansetron (ZOFRAN) 4 MG tablet Take 1 tablet (4 mg total) by mouth every 8 (eight) hours as needed for Nausea.    oxycodone-acetaminophen (PERCOCET)  mg per tablet Take 1 tablet  "by mouth every 6 (six) hours as needed for Pain.    pantoprazole (PROTONIX) 40 MG tablet Take 1 tablet (40 mg total) by mouth once daily.    promethazine-dextromethorphan (PROMETHAZINE-DM) 6.25-15 mg/5 mL Syrp Take by mouth.    rosuvastatin (CRESTOR) 10 MG tablet Take 1 tablet (10 mg total) by mouth every evening.    spironolactone (ALDACTONE) 50 MG tablet Take 1 tablet (50 mg total) by mouth 2 (two) times daily.    tamsulosin (FLOMAX) 0.4 mg Cp24 Take 1 capsule (0.4 mg total) by mouth once daily.           Clinical Reference Information           Vital Signs - Last Recorded  Most recent update: 1/25/2017 11:23 AM by Trinity Martin LPN    BP Pulse Resp Ht Wt SpO2    (!) 104/54 75 20 5' 7.5" (1.715 m) 101.7 kg (224 lb 3.3 oz) (!) 93%    BMI                34.6 kg/m2          Blood Pressure          Most Recent Value    BP  (!)  104/54      Allergies as of 1/25/2017     Adhesive    Benzalkonium Chloride    Cephalexin    Gramicidin D    Neomycin-bacitracin-polymyxin      Immunizations Administered on Date of Encounter - 1/25/2017     None      Orders Placed During Today's Visit      Normal Orders This Visit    Ambulatory Referral to Nephrology     Future Labs/Procedures Expected by Expires    X-Ray Chest PA And Lateral  As directed 1/25/2019      [facility] Sign-Up     Activating your [facility] account is as easy as 1-2-3!     1) Visit [email or web address], select Sign Up Now, enter this activation code and your date of birth, then select Next.  [email or web address]  Expires: 3/11/2017 12:29 PM      2) Create a username and password to use when you visit [facility] in the future and select a security question in case you lose your password and select Next.    3) Enter your e-mail address and click Sign Up!    Additional Information  If you have questions, please e-mail [email or web address] or call [phone] to talk to our [facility] staff. Remember, [facility] is NOT to be used for urgent needs. For medical emergencies, dial " 911.         Instructions      Spironolactone Oral tablet  What is this medicine?  SPIRONOLACTONE (dhruv on oh LAK tone) is a diuretic. It helps you make more urine and to lose excess water from your body. This medicine is used to treat high blood pressure, and edema or swelling from heart, kidney, or liver disease. It is also used to treat patients who make too much aldosterone or have low potassium.  This medicine may be used for other purposes; ask your health care provider or pharmacist if you have questions.  What should I tell my health care provider before I take this medicine?  They need to know if you have any of these conditions:  · high blood level of potassium  · kidney disease or trouble making urine  · liver disease  · an unusual or allergic reaction to spironolactone, other medicines, foods, dyes, or preservatives  · pregnant or trying to get pregnant  · breast-feeding  How should I use this medicine?  Take this medicine by mouth with a drink of water. Follow the directions on your prescription label. You can take it with or without food. If it upsets your stomach, take it with food. Do not take your medicine more often than directed. Remember that you will need to pass more urine after taking this medicine. Do not take your doses at a time of day that will cause you problems. Do not take at bedtime.  Talk to your pediatrician regarding the use of this medicine in children. While this drug may be prescribed for selected conditions, precautions do apply.  Overdosage: If you think you have taken too much of this medicine contact a poison control center or emergency room at once.  NOTE: This medicine is only for you. Do not share this medicine with others.  What if I miss a dose?  If you miss a dose, take it as soon as you can. If it is almost time for your next dose, take only that dose. Do not take double or extra doses.  What may interact with this medicine?  Do not take this medicine with any of the  following medications:  · eplerenone  This medicine may also interact with the following medications:  · corticosteroids  · digoxin  · lithium  · medicines for high blood pressure like ACE inhibitors  · skeletal muscle relaxants like tubocurarine  · NSAIDs, medicines for pain and inflammation, like ibuprofen or naproxen  · potassium products like salt substitute or supplements  · pressor amines like norepinephrine  · some diuretics  This list may not describe all possible interactions. Give your health care provider a list of all the medicines, herbs, non-prescription drugs, or dietary supplements you use. Also tell them if you smoke, drink alcohol, or use illegal drugs. Some items may interact with your medicine.  What should I watch for while using this medicine?  Visit your doctor or health care professional for regular checks on your progress. Check your blood pressure as directed. Ask your doctor what your blood pressure should be, and when you should contact them.  You may need to be on a special diet while taking this medicine. Ask your doctor. Also, ask how many glasses of fluid you need to drink a day. You must not get dehydrated.  This medicine may make you feel confused, dizzy or lightheaded. Drinking alcohol and taking some medicines can make this worse. Do not drive, use machinery, or do anything that needs mental alertness until you know how this medicine affects you. Do not sit or stand up quickly.  What side effects may I notice from receiving this medicine?  Side effects that you should report to your doctor or health care professional as soon as possible:  · allergic reactions such as skin rash or itching, hives, swelling of the lips, mouth, tongue, or throat  · black or tarry stools  · fast, irregular heartbeat  · fever  · muscle pain, cramps  · numbness, tingling in hands or feet  · trouble breathing  · trouble passing urine  · unusual bleeding  · unusually weak or tired  Side effects that  usually do not require medical attention (report to your doctor or health care professional if they continue or are bothersome):  · change in voice or hair growth  · confusion  · dizzy, drowsy  · dry mouth, increased thirst  · enlarged or tender breasts  · headache  · irregular menstrual periods  · sexual difficulty, unable to have an erection  · stomach upset  This list may not describe all possible side effects. Call your doctor for medical advice about side effects. You may report side effects to FDA at 6-228-HAR-7112.  Where should I keep my medicine?  Keep out of the reach of children.  Store below 25 degrees C (77 degrees F). Throw away any unused medicine after the expiration date.  NOTE:This sheet is a summary. It may not cover all possible information. If you have questions about this medicine, talk to your doctor, pharmacist, or health care provider. Copyright© 2016 [employer]      Furosemide Oral tablet  What is this medicine?  FUROSEMIDE (fyoor [place] se mide) is a diuretic. It helps you make more urine and to lose salt and excess water from your body. This medicine is used to treat high blood pressure, and edema or swelling from heart, kidney, or liver disease.  This medicine may be used for other purposes; ask your health care provider or pharmacist if you have questions.  What should I tell my health care provider before I take this medicine?  They need to know if you have any of these conditions:  · abnormal blood electrolytes  · diarrhea or vomiting  · gout  · heart disease  · kidney disease, small amounts of urine, or difficulty passing urine  · liver disease  · thyroid disease  · an unusual or allergic reaction to furosemide, sulfa drugs, other medicines, foods, dyes, or preservatives  · pregnant or trying to get pregnant  · breast-feeding  How should I use this medicine?  Take this medicine by mouth with a glass of water. Follow the directions on the prescription label. You may take this medicine  with or without food. If it upsets your stomach, take it with food or milk. Do not take your medicine more often than directed. Remember that you will need to pass more urine after taking this medicine. Do not take your medicine at a time of day that will cause you problems. Do not take at bedtime.  Talk to your pediatrician regarding the use of this medicine in children. While this drug may be prescribed for selected conditions, precautions do apply.  Overdosage: If you think you have taken too much of this medicine contact a poison control center or emergency room at once.  NOTE: This medicine is only for you. Do not share this medicine with others.  What if I miss a dose?  If you miss a dose, take it as soon as you can. If it is almost time for your next dose, take only that dose. Do not take double or extra doses.  What may interact with this medicine?  · aspirin and aspirin-like medicines  · certain antibiotics  · chloral hydrate  · cisplatin  · cyclosporine  · digoxin  · diuretics  · laxatives  · lithium  · medicines for blood pressure  · medicines that relax muscles for surgery  · methotrexate  · NSAIDs, medicines for pain and inflammation like ibuprofen, naproxen, or indomethacin  · phenytoin  · steroid medicines like prednisone or cortisone  · sucralfate  · thyroid hormones  This list may not describe all possible interactions. Give your health care provider a list of all the medicines, herbs, non-prescription drugs, or dietary supplements you use. Also tell them if you smoke, drink alcohol, or use illegal drugs. Some items may interact with your medicine.  What should I watch for while using this medicine?  Visit your doctor or health care professional for regular checks on your progress. Check your blood pressure regularly. Ask your doctor or health care professional what your blood pressure should be, and when you should contact him or her. If you are a diabetic, check your blood sugar as directed.  You  may need to be on a special diet while taking this medicine. Check with your doctor. Also, ask how many glasses of fluid you need to drink a day. You must not get dehydrated.  You may get drowsy or dizzy. Do not drive, use machinery, or do anything that needs mental alertness until you know how this drug affects you. Do not stand or sit up quickly, especially if you are an older patient. This reduces the risk of dizzy or fainting spells. Alcohol can make you more drowsy and dizzy. Avoid alcoholic drinks.  This medicine can make you more sensitive to the sun. Keep out of the sun. If you cannot avoid being in the sun, wear protective clothing and use sunscreen. Do not use sun lamps or tanning beds/booths.  What side effects may I notice from receiving this medicine?  Side effects that you should report to your doctor or health care professional as soon as possible:  · blood in urine or stools  · dry mouth  · fever or chills  · hearing loss or ringing in the ears  · irregular heartbeat  · muscle pain or weakness, cramps  · skin rash  · stomach upset, pain, or nausea  · tingling or numbness in the hands or feet  · unusually weak or tired  · vomiting or diarrhea  · yellowing of the eyes or skin  Side effects that usually do not require medical attention (report to your doctor or health care professional if they continue or are bothersome):  · headache  · loss of appetite  · unusual bleeding or bruising  This list may not describe all possible side effects. Call your doctor for medical advice about side effects. You may report side effects to FDA at 3-092-YGP-9209.  Where should I keep my medicine?  Keep out of the reach of children.  Store at room temperature between 15 and 30 degrees C (59 and 86 degrees F). Protect from light. Throw away any unused medicine after the expiration date.  NOTE:This sheet is a summary. It may not cover all possible information. If you have questions about this medicine, talk to your doctor,  pharmacist, or health care provider. Copyright© 2016 [employer] Standard

## 2017-02-02 ENCOUNTER — LAB VISIT (OUTPATIENT)
Dept: LAB | Facility: HOSPITAL | Age: 82
End: 2017-02-02
Attending: INTERNAL MEDICINE
Payer: MEDICARE

## 2017-02-02 ENCOUNTER — INFUSION (OUTPATIENT)
Dept: INFUSION THERAPY | Facility: HOSPITAL | Age: 82
End: 2017-02-02
Attending: INTERNAL MEDICINE
Payer: MEDICARE

## 2017-02-02 ENCOUNTER — OFFICE VISIT (OUTPATIENT)
Dept: HEMATOLOGY/ONCOLOGY | Facility: CLINIC | Age: 82
End: 2017-02-02
Payer: MEDICARE

## 2017-02-02 VITALS
WEIGHT: 225.5 LBS | HEART RATE: 76 BPM | OXYGEN SATURATION: 96 % | DIASTOLIC BLOOD PRESSURE: 54 MMHG | BODY MASS INDEX: 34.17 KG/M2 | HEIGHT: 68 IN | TEMPERATURE: 98 F | SYSTOLIC BLOOD PRESSURE: 110 MMHG | RESPIRATION RATE: 24 BRPM

## 2017-02-02 DIAGNOSIS — C90.00 MULTIPLE MYELOMA, REMISSION STATUS UNSPECIFIED: ICD-10-CM

## 2017-02-02 DIAGNOSIS — D51.8 OTHER VITAMIN B12 DEFICIENCY ANEMIA: Primary | ICD-10-CM

## 2017-02-02 DIAGNOSIS — B36.9 FUNGAL INFECTION OF SKIN: Primary | ICD-10-CM

## 2017-02-02 DIAGNOSIS — I35.0 NONRHEUMATIC AORTIC VALVE STENOSIS: ICD-10-CM

## 2017-02-02 DIAGNOSIS — D51.8 OTHER VITAMIN B12 DEFICIENCY ANEMIA: ICD-10-CM

## 2017-02-02 DIAGNOSIS — C90.01 MULTIPLE MYELOMA IN REMISSION: ICD-10-CM

## 2017-02-02 DIAGNOSIS — I50.33 ACUTE ON CHRONIC DIASTOLIC CONGESTIVE HEART FAILURE: ICD-10-CM

## 2017-02-02 DIAGNOSIS — G89.29 CHRONIC MIDLINE BACK PAIN, UNSPECIFIED BACK LOCATION: ICD-10-CM

## 2017-02-02 DIAGNOSIS — M54.9 CHRONIC MIDLINE BACK PAIN, UNSPECIFIED BACK LOCATION: ICD-10-CM

## 2017-02-02 DIAGNOSIS — J96.21 ACUTE ON CHRONIC RESPIRATORY FAILURE WITH HYPOXIA: ICD-10-CM

## 2017-02-02 DIAGNOSIS — E53.8 VITAMIN B 12 DEFICIENCY: ICD-10-CM

## 2017-02-02 DIAGNOSIS — C90.00 MULTIPLE MYELOMA NOT HAVING ACHIEVED REMISSION: ICD-10-CM

## 2017-02-02 DIAGNOSIS — F41.9 ANXIETY: ICD-10-CM

## 2017-02-02 PROBLEM — J96.20 ACUTE ON CHRONIC RESPIRATORY FAILURE: Status: ACTIVE | Noted: 2017-02-02

## 2017-02-02 LAB
ALBUMIN SERPL BCP-MCNC: 3.4 G/DL
ALP SERPL-CCNC: 57 U/L
ALT SERPL W/O P-5'-P-CCNC: 17 U/L
ANION GAP SERPL CALC-SCNC: 13 MMOL/L
AST SERPL-CCNC: 13 U/L
BASOPHILS # BLD AUTO: 0.02 K/UL
BASOPHILS NFR BLD: 0.2 %
BILIRUB SERPL-MCNC: 0.4 MG/DL
BUN SERPL-MCNC: 23 MG/DL
CALCIUM SERPL-MCNC: 8.9 MG/DL
CHLORIDE SERPL-SCNC: 101 MMOL/L
CO2 SERPL-SCNC: 28 MMOL/L
CREAT SERPL-MCNC: 1.1 MG/DL
DIFFERENTIAL METHOD: ABNORMAL
EOSINOPHIL # BLD AUTO: 0.1 K/UL
EOSINOPHIL NFR BLD: 1.2 %
ERYTHROCYTE [DISTWIDTH] IN BLOOD BY AUTOMATED COUNT: 17.6 %
EST. GFR  (AFRICAN AMERICAN): >60 ML/MIN/1.73 M^2
EST. GFR  (NON AFRICAN AMERICAN): >60 ML/MIN/1.73 M^2
GLUCOSE SERPL-MCNC: 74 MG/DL
HCT VFR BLD AUTO: 32 %
HGB BLD-MCNC: 10.3 G/DL
LYMPHOCYTES # BLD AUTO: 2.1 K/UL
LYMPHOCYTES NFR BLD: 18.5 %
MCH RBC QN AUTO: 32.9 PG
MCHC RBC AUTO-ENTMCNC: 32.2 %
MCV RBC AUTO: 102 FL
MONOCYTES # BLD AUTO: 2.1 K/UL
MONOCYTES NFR BLD: 18.7 %
NEUTROPHILS # BLD AUTO: 6.9 K/UL
NEUTROPHILS NFR BLD: 61.4 %
PLATELET # BLD AUTO: 230 K/UL
PMV BLD AUTO: 9.7 FL
POTASSIUM SERPL-SCNC: 3.5 MMOL/L
PROT SERPL-MCNC: 6.5 G/DL
RBC # BLD AUTO: 3.13 M/UL
SODIUM SERPL-SCNC: 142 MMOL/L
WBC # BLD AUTO: 11.14 K/UL

## 2017-02-02 PROCEDURE — 1125F AMNT PAIN NOTED PAIN PRSNT: CPT | Mod: S$GLB,,, | Performed by: INTERNAL MEDICINE

## 2017-02-02 PROCEDURE — 1160F RVW MEDS BY RX/DR IN RCRD: CPT | Mod: S$GLB,,, | Performed by: INTERNAL MEDICINE

## 2017-02-02 PROCEDURE — 99215 OFFICE O/P EST HI 40 MIN: CPT | Mod: 25,S$GLB,, | Performed by: INTERNAL MEDICINE

## 2017-02-02 PROCEDURE — 80053 COMPREHEN METABOLIC PANEL: CPT

## 2017-02-02 PROCEDURE — 85025 COMPLETE CBC W/AUTO DIFF WBC: CPT

## 2017-02-02 PROCEDURE — 36415 COLL VENOUS BLD VENIPUNCTURE: CPT

## 2017-02-02 PROCEDURE — 99499 UNLISTED E&M SERVICE: CPT | Mod: S$GLB,,, | Performed by: INTERNAL MEDICINE

## 2017-02-02 PROCEDURE — 3074F SYST BP LT 130 MM HG: CPT | Mod: S$GLB,,, | Performed by: INTERNAL MEDICINE

## 2017-02-02 PROCEDURE — 99999 PR PBB SHADOW E&M-EST. PATIENT-LVL V: CPT | Mod: PBBFAC,,, | Performed by: INTERNAL MEDICINE

## 2017-02-02 PROCEDURE — 96372 THER/PROPH/DIAG INJ SC/IM: CPT

## 2017-02-02 PROCEDURE — 1159F MED LIST DOCD IN RCRD: CPT | Mod: S$GLB,,, | Performed by: INTERNAL MEDICINE

## 2017-02-02 PROCEDURE — 3078F DIAST BP <80 MM HG: CPT | Mod: S$GLB,,, | Performed by: INTERNAL MEDICINE

## 2017-02-02 PROCEDURE — 63600175 PHARM REV CODE 636 W HCPCS: Performed by: INTERNAL MEDICINE

## 2017-02-02 PROCEDURE — 1157F ADVNC CARE PLAN IN RCRD: CPT | Mod: S$GLB,,, | Performed by: INTERNAL MEDICINE

## 2017-02-02 RX ORDER — LENALIDOMIDE 10 MG/1
CAPSULE ORAL
Qty: 21 EACH | Refills: 0 | Status: SHIPPED | OUTPATIENT
Start: 2017-02-02 | End: 2017-03-01 | Stop reason: SDUPTHER

## 2017-02-02 RX ORDER — CLOTRIMAZOLE 1 %
CREAM (GRAM) TOPICAL
Qty: 1 TUBE | Refills: 3 | Status: SHIPPED | OUTPATIENT
Start: 2017-02-02

## 2017-02-02 RX ORDER — CLONAZEPAM 0.5 MG/1
0.5 TABLET ORAL DAILY
Qty: 60 TABLET | Refills: 0 | Status: SHIPPED | OUTPATIENT
Start: 2017-02-02 | End: 2017-05-01 | Stop reason: SDUPTHER

## 2017-02-02 RX ORDER — CYANOCOBALAMIN 1000 UG/ML
1000 INJECTION, SOLUTION INTRAMUSCULAR; SUBCUTANEOUS
Status: DISCONTINUED | OUTPATIENT
Start: 2017-02-02 | End: 2017-02-02 | Stop reason: HOSPADM

## 2017-02-02 RX ORDER — OXYCODONE AND ACETAMINOPHEN 10; 325 MG/1; MG/1
1 TABLET ORAL EVERY 6 HOURS PRN
Qty: 60 TABLET | Refills: 0 | Status: ON HOLD | OUTPATIENT
Start: 2017-02-02 | End: 2017-03-20 | Stop reason: HOSPADM

## 2017-02-02 RX ORDER — CYANOCOBALAMIN 1000 UG/ML
1000 INJECTION, SOLUTION INTRAMUSCULAR; SUBCUTANEOUS
Status: CANCELLED | OUTPATIENT
Start: 2017-02-02

## 2017-02-02 RX ORDER — CYANOCOBALAMIN 1000 UG/ML
1000 INJECTION, SOLUTION INTRAMUSCULAR; SUBCUTANEOUS
Status: CANCELLED | OUTPATIENT
Start: 2017-03-01

## 2017-02-02 RX ADMIN — CYANOCOBALAMIN 1000 MCG: 1000 INJECTION INTRAMUSCULAR; SUBCUTANEOUS at 11:02
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
## 2017-02-02 NOTE — MR AVS SNAPSHOT
Patient Information     Patient Name Sex Lorne Stephens Male 1935      Visit Information        Provider Department Dept Phone Center    2017 12:00 PM [facility] [phone] [facility]      Patient Instructions      [facility]  [address]  [address] [address]  [phone] phone     [phone] fax  Hours of Operation: Monday- Friday 8:00am- 5:00pm  After hours phone  [phone]  Hematology / Oncology Physicians on call      Dr. Tomi Vaughn    Please call with any concerns regarding your appointment today.       Your Current Medications Are     acyclovir (ZOVIRAX) 400 MG tablet    albuterol 90 mcg/actuation inhaler    albuterol-ipratropium 2.5mg-0.5mg/3mL (DUO-NEB) 0.5 mg-3 mg(2.5 mg base)/3 mL nebulizer solution    ammonium lactate (AMLACTIN) 12 % lotion    aspirin (ECOTRIN) 81 MG EC tablet    atenolol (TENORMIN) 25 MG tablet    buPROPion (WELLBUTRIN) 75 MG tablet    clonazePAM (KLONOPIN) 0.25 MG TbDL    clopidogrel (PLAVIX) 75 mg tablet    dexamethasone (DECADRON) 4 MG Tab    docusate sodium (COLACE) 100 MG capsule    ergocalciferol (VITAMIN D2) 50,000 unit Cap    fluticasone (FLONASE) 50 mcg/actuation nasal spray    fluticasone-salmeterol 250-50 mcg/dose (ADVAIR DISKUS) 250-50 mcg/dose diskus inhaler    furosemide (LASIX) 40 MG tablet    hydrOXYzine HCl (ATARAX) 10 MG Tab    lenalidomide (REVLIMID) 10 mg Cap    levothyroxine (SYNTHROID) 25 MCG tablet    magnesium oxide (MAG-OX) 400 mg tablet    meloxicam (MOBIC) 7.5 MG tablet    midodrine (PROAMATINE) 2.5 MG Tab    nitroGLYCERIN (NITROSTAT) 0.4 MG SL tablet    nystatin (MYCOSTATIN) cream    nystatin (MYCOSTATIN) powder    ondansetron (ZOFRAN) 4 MG tablet    oxycodone-acetaminophen (PERCOCET)  mg per tablet    pantoprazole (PROTONIX) 40 MG tablet    promethazine-dextromethorphan (PROMETHAZINE-DM) 6.25-15 mg/5 mL  Syrp    rosuvastatin (CRESTOR) 10 MG tablet    tamsulosin (FLOMAX) 0.4 mg Cp24    hydrocodone-acetaminophen 10-325mg (NORCO)  mg Tab (Discontinued)    lenalidomide (REVLIMID) 10 mg Cap (Discontinued)    clonazePAM (KLONOPIN) 0.5 MG tablet    clotrimazole (LOTRIMIN) 1 % cream    cyanocobalamin 1,000 mcg/mL injection    isosorbide mononitrate (IMDUR) 30 MG 24 hr tablet    oxycodone-acetaminophen (PERCOCET)  mg per tablet    clotrimazole (LOTRIMIN) 1 % cream (Discontinued)    ipratropium (ATROVENT) 0.06 % nasal spray (Discontinued)    spironolactone (ALDACTONE) 50 MG tablet (Discontinued)      Facility-Administered Medications     cyanocobalamin injection 1,000 mcg      Appointments for Next Year     2/2/2017 12:00 PM INFUSION 015 MIN (15 min.) [facility] CHAIR 05 [facility]    Arrive at check-in approximately 15 minutes before your scheduled appointment time. Bring all outside medical records and imaging, along with a list of your current medications and insurance card.    (off [facility]) 1st floor    3/1/2017 11:00 AM NON FASTING LAB (5 min.) [facility]    Arrive at check-in approximately 15 minutes before your scheduled appointment time. Bring all outside medical records and imaging, along with a list of your current medications and insurance card.    (off [address]) 2nd floor    3/1/2017 11:20 AM ESTABLISHED PATIENT (20 min.) [facility] - Hemotology Oncology Zheng Isabel MD    Arrive at check-in approximately 15 minutes before your scheduled appointment time. Bring all outside medical records and imaging, along with a list of your current medications and insurance card.    (off [address]) 3rd Floor    3/1/2017 11:45 AM INFUSION 015 MIN (15 min.) [facility] CHAIR 08 [facility]    Arrive at check-in approximately 15 minutes before your scheduled appointment time. Bring all outside medical records and imaging, along with a list of  "your current medications and insurance card.    7/26/2017 10:00 AM DIAGNOSTIC XRAY (15 min.) [facility] XR2    Arrive at check-in approximately 15 minutes before your scheduled appointment time. Bring all outside medical records and imaging, along with a list of your current medications and insurance card.    (off [address]) 2nd Floor    7/26/2017 10:20 AM ESTABLISHED PATIENT (20 min.) [facility] - Pulmonary Services Shayan Szymanski MD    Arrive at check-in approximately 15 minutes before your scheduled appointment time. Bring all outside medical records and imaging, along with a list of your current medications and insurance card.    (off [address]) 3th floor         Default Flowsheet Data (last 24 hours)      Amb Complex Vitals Mono        02/02/17 1124                Measurements    Weight 102.3 kg (225 lb 8.5 oz)        Height 5' 7.5" (1.715 m)        BSA (Calculated - sq m) 2.21 sq meters        BMI (Calculated) 34.9        BP (!)  110/54        Temp 97.5 °F (36.4 °C)        Pulse 76        Resp (!)  24        SpO2 96 %   3 liters        Pain Assessment    Pain Score Seven        Pain Loc GENERALIZED                Allergies     Adhesive     Benzalkonium Chloride     Cephalexin     Other reaction(s): Diarrhea    Gramicidin D     Neomycin-bacitracin-polymyxin     Other reaction(s): Unknown      Medications You Received from 02/01/2017 1155 to 02/02/2017 1155        Date/Time Order Dose Route Action     02/02/2017 1152 cyanocobalamin injection 1,000 mcg 1,000 mcg Intramuscular Given      Current Discharge Medication List     Cannot display discharge medications since this is not an admission.      "

## 2017-02-03 ENCOUNTER — TELEPHONE (OUTPATIENT)
Dept: HEMATOLOGY/ONCOLOGY | Facility: CLINIC | Age: 82
End: 2017-02-03

## 2017-02-03 NOTE — TELEPHONE ENCOUNTER
----- Message from Barb Etienne sent at 2/3/2017  2:52 PM CST -----  Contact: [facility]  Pls call [facility] in reference to pt's rx for Percocet 10. Pls call [phone].

## 2017-02-03 NOTE — TELEPHONE ENCOUNTER
[facility] called to let Dr Isabel know that Dr Brad Minaya wrote a prescription for Percocet 5/325 on 1/27/17 for patient.  1/31/17  90 were dispensed.  Pharmacy wanted to know if Dr Isabel wanted. To fill the prescription he wrote yesterday.  Dr Isabel notified of above. And per his instructions  Advised pharmacy to Hold the prescription.    Tried to contact patient to let him know that his prescription was blocked He can not fill the percocet 10/325 until he finishes the percocet 5/325

## 2017-02-06 ENCOUNTER — TELEPHONE (OUTPATIENT)
Dept: HEMATOLOGY/ONCOLOGY | Facility: CLINIC | Age: 82
End: 2017-02-06

## 2017-02-06 NOTE — TELEPHONE ENCOUNTER
----- Message from Angelina Granado sent at 2/6/2017  8:14 AM CST -----  Patient states he was returning your call from Friday.   Call him at [phone].                                 chandler

## 2017-02-06 NOTE — TELEPHONE ENCOUNTER
----- Message from Barb Etienne sent at 2/6/2017  3:30 PM CST -----  Contact: Kenisha/spouse  Kenisha is requesting to speak to the nurse regarding pt's medication. Pls call Kenisha back at [phone].

## 2017-02-08 RX ORDER — GABAPENTIN 600 MG/1
TABLET ORAL
Qty: 270 TABLET | Refills: 0 | Status: SHIPPED | OUTPATIENT
Start: 2017-02-08 | End: 2017-03-31

## 2017-02-13 ENCOUNTER — TELEPHONE (OUTPATIENT)
Dept: HEMATOLOGY/ONCOLOGY | Facility: CLINIC | Age: 82
End: 2017-02-13

## 2017-02-13 NOTE — TELEPHONE ENCOUNTER
Mr Roe is complaining about  The top of his head being sore.  He has a strap that goes around his head at night to keep his c pap on.  Could this be his cancer making the top of his head sore?

## 2017-02-13 NOTE — TELEPHONE ENCOUNTER
----- Message from Donna Ocampo sent at 2/13/2017 12:48 PM CST -----  Contact: pt wife - Елена   States that pt's head is real sore on top. Is not sure if it's the c-pap or the cancer and wants to discuss and can be reached at [phone] or [phone]/cristobal/courtney

## 2017-02-15 NOTE — TELEPHONE ENCOUNTER
Advised wife on 2/13/17 that his multiple myeloma would not be cause of the soreness of the top of his head per Dr Isabel's instruction.

## 2017-03-01 ENCOUNTER — SOCIAL WORK (OUTPATIENT)
Dept: HEMATOLOGY/ONCOLOGY | Facility: CLINIC | Age: 82
End: 2017-03-01
Payer: MEDICARE

## 2017-03-01 ENCOUNTER — INFUSION (OUTPATIENT)
Dept: INFUSION THERAPY | Facility: HOSPITAL | Age: 82
End: 2017-03-01
Attending: INTERNAL MEDICINE
Payer: MEDICARE

## 2017-03-01 ENCOUNTER — OFFICE VISIT (OUTPATIENT)
Dept: HEMATOLOGY/ONCOLOGY | Facility: CLINIC | Age: 82
End: 2017-03-01
Payer: MEDICARE

## 2017-03-01 VITALS
SYSTOLIC BLOOD PRESSURE: 120 MMHG | HEIGHT: 68 IN | WEIGHT: 218.94 LBS | RESPIRATION RATE: 18 BRPM | BODY MASS INDEX: 33.18 KG/M2 | TEMPERATURE: 99 F | OXYGEN SATURATION: 97 % | HEIGHT: 68 IN | HEART RATE: 77 BPM | WEIGHT: 218.94 LBS | DIASTOLIC BLOOD PRESSURE: 80 MMHG | BODY MASS INDEX: 33.18 KG/M2

## 2017-03-01 DIAGNOSIS — C90.00 MULTIPLE MYELOMA NOT HAVING ACHIEVED REMISSION: ICD-10-CM

## 2017-03-01 DIAGNOSIS — C90.01 MULTIPLE MYELOMA IN REMISSION: Primary | ICD-10-CM

## 2017-03-01 DIAGNOSIS — D51.8 OTHER VITAMIN B12 DEFICIENCY ANEMIA: Primary | ICD-10-CM

## 2017-03-01 DIAGNOSIS — C90.00 MULTIPLE MYELOMA, REMISSION STATUS UNSPECIFIED: ICD-10-CM

## 2017-03-01 PROCEDURE — 99999 PR PBB SHADOW E&M-EST. PATIENT-LVL V: CPT | Mod: PBBFAC,,, | Performed by: INTERNAL MEDICINE

## 2017-03-01 PROCEDURE — 3074F SYST BP LT 130 MM HG: CPT | Mod: S$GLB,,, | Performed by: INTERNAL MEDICINE

## 2017-03-01 PROCEDURE — 63600175 PHARM REV CODE 636 W HCPCS: Mod: PO | Performed by: INTERNAL MEDICINE

## 2017-03-01 PROCEDURE — 1159F MED LIST DOCD IN RCRD: CPT | Mod: S$GLB,,, | Performed by: INTERNAL MEDICINE

## 2017-03-01 PROCEDURE — 1125F AMNT PAIN NOTED PAIN PRSNT: CPT | Mod: S$GLB,,, | Performed by: INTERNAL MEDICINE

## 2017-03-01 PROCEDURE — 3079F DIAST BP 80-89 MM HG: CPT | Mod: S$GLB,,, | Performed by: INTERNAL MEDICINE

## 2017-03-01 PROCEDURE — 96372 THER/PROPH/DIAG INJ SC/IM: CPT | Mod: PO

## 2017-03-01 PROCEDURE — 99499 UNLISTED E&M SERVICE: CPT | Mod: S$GLB,,, | Performed by: INTERNAL MEDICINE

## 2017-03-01 PROCEDURE — 1160F RVW MEDS BY RX/DR IN RCRD: CPT | Mod: S$GLB,,, | Performed by: INTERNAL MEDICINE

## 2017-03-01 PROCEDURE — 99214 OFFICE O/P EST MOD 30 MIN: CPT | Mod: 25,S$GLB,, | Performed by: INTERNAL MEDICINE

## 2017-03-01 PROCEDURE — 1157F ADVNC CARE PLAN IN RCRD: CPT | Mod: S$GLB,,, | Performed by: INTERNAL MEDICINE

## 2017-03-01 RX ORDER — LENALIDOMIDE 10 MG/1
CAPSULE ORAL
Qty: 21 EACH | Refills: 0 | Status: SHIPPED | OUTPATIENT
Start: 2017-03-01 | End: 2017-03-30 | Stop reason: SDUPTHER

## 2017-03-01 RX ORDER — CYANOCOBALAMIN 1000 UG/ML
1000 INJECTION, SOLUTION INTRAMUSCULAR; SUBCUTANEOUS
Status: CANCELLED | OUTPATIENT
Start: 2017-04-01

## 2017-03-01 RX ORDER — DEXAMETHASONE 4 MG/1
TABLET ORAL
Qty: 40 TABLET | Refills: 6 | Status: SHIPPED | OUTPATIENT
Start: 2017-03-01 | End: 2017-04-25 | Stop reason: SDUPTHER

## 2017-03-01 RX ORDER — CYANOCOBALAMIN 1000 UG/ML
1000 INJECTION, SOLUTION INTRAMUSCULAR; SUBCUTANEOUS
Status: DISCONTINUED | OUTPATIENT
Start: 2017-03-01 | End: 2017-03-01 | Stop reason: HOSPADM

## 2017-03-01 RX ADMIN — CYANOCOBALAMIN 1000 MCG: 1000 INJECTION, SOLUTION INTRAMUSCULAR at 11:03

## 2017-03-01 NOTE — PROGRESS NOTES
"Met with pt's wife to f/u. Pt will continue on Revlimid. Refill faxed to [facility]. Mrs. Roe also wanted to be sure his med list was up-to-date. Reviewed list that she had with list in Epic. It appears to be up-to-date.     Mrs. Roe has no immediate needs at this time. Says pt has ups and downs. Looking to find medical facility that pt was found to have "high blood levels" many years ago, to determine if this is in any way connected with current multiple myeloma diagnosis. May be able to get back pay through [facility]. Offered to help however possible with this.     Finally, she said pt has now gotten 100% disability with the [facility]. She says they have recommended pt see a  for counseling regarding PTSD. Can do this through [facility]; also provided her with card for Cyrus Jimenez LCSW at [facility].     No other needs expressed at this time. Will continue to follow to provide support and assistance.  "
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
## 2017-03-01 NOTE — PROGRESS NOTES
Subjective:       Patient ID: Lorne Roe is a 82 y.o. male.    Chief Complaint: Follow-up    HPI This is a 81 year-old  gentleman who comes today for follow up of his smoldering myeloma.  He is who known to us since 11/2008 because of mild anemia. Work up at that time showed vitamin B12 in the lower limits of normal. He was given vitamin B12 supplements once a month with improvement. In addition, a serum protein electrophoresis was reported as positive showing an IgA monoclonal spike of 1.18 g. Eventually it sisi to 1.3 g. He had x-rays of the skull and other bones all of which were negative. His UPEP was negative for a monoclonal spike. His chem 20 as normal and has remained with normal calcium, protein and creatinine tthorugh the years. He had a bone marrow aspiration and biopsy on 05/03/2010 because of his increase in the spike of serum protein electrophoresis. 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    Serum free light chains in 07/2009 were reported as increased mostly at the expense of the lambda chains. There were measured at 7.13. We felt that based on the absence of lytic lesions, hypercalcemia, renal failure or significant anemia, he had at best a smoldering myeloma that we could follow expectantly. \He has chronic back pain of the spine    Imaging studies on 03/17/2011 ordered by orthopedics showed some disk disease and degenerative joint disease but no evidence of myeloma.    He was seen dr Carmichael of physical medicine A repeat MRI of the LS spine on 8/2014 showed DJD changes and no obvious evidence of myeloma.      He has chronic SOB for which he follows up with dr Szymanski and is on home 02.  His lab test in October 2016 show anemia of  10.8  calciumwas normal, total Serum protein iwas 10.  SPEp now show a monoclonal spike of 4.82 gr with lambda free light chains at 108.  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 And was started on rev/dex. He required plasmapheresis for a few days at the time of the start of the treatment  In Jan 2017 shaun out of the hospital after a prolonged series of back to back admissions to the [facility], [facility], and the [facility].  He has responded nicely to the initial treatment and is now on rev 10 mg a day from d 1-21 then 7 days off, and dexamethsone 40 mg once day a week  He is doing better with his chronic respiratory failue and recently was seen by Dr Szymanski  He continues to have back pain. Hydrocodone does not work. Percocet seems to work better  He has a hx of chronic b12 deficiency and gets b12 at our office  He remains SOb on around the clock 02    Zometa has not been given due to ongoing dental issues with several teeth  ALLERGIES: see med card  MEDICATIONS: See Med Card.  PAST MEDICAL HISTORY:  1. Smoldering multiple myeloma.  2. Obesity.  3. Chronic back pain with degenerative disk disease.  4. Coronary artery disease, status post bypass and coronary stents and  previous heart attacks.  5. High blood pressure.  6. Elevated cholesterol.  7. Peripheral vascular disease -- carotid stenosis.  8. History of leg edema.  9. Arthritis.  10.COPD/chronci respiratory failure  11-b12 deficiency  PREVIOUS SURGERIES: Coronary bypass surgery in 2003. Facial surgery  following an accident many years ago. Lower back surgery in 1998.  SOCIAL HISTORY: He is  with no children. He lives in [place].  He smoked until 1997 averaging two " packs a day. He did this for 50 years.  He used to drink up to two cases of beer per week but stopped in 1995. He  used to be in the [employer] and retired from it and also worked as a riverboat  captain for several years.                Family History   Problem Relation Age of Onset    Heart disease Father       Melanoma Neg Hx       Psoriasis Neg Hx       Lupus Neg Hx       Eczema Neg Hx                Review of Systems   Constitutional: Positive for fatigue.   Eyes: Negative.    Respiratory:        Chronic SOB     Cardiovascular: Negative.  Negative for chest pain.   Gastrointestinal: Negative for abdominal pain and nausea.   Genitourinary: Negative.  Negative for hematuria.   Musculoskeletal: Negative.    Skin: Negative.    Neurological: Negative.    Psychiatric/Behavioral: Negative.        Objective:      Physical Exam   Constitutional: He is oriented to person, place, and time. He appears well-developed and well-nourished.   Overweight     HENT:   Head: Normocephalic.   Mouth/Throat: No oropharyngeal exudate.   Eyes: Pupils are equal, round, and reactive to light.   Neck: No thyromegaly present.   Cardiovascular: Normal rate, regular rhythm and normal heart sounds.  Exam reveals no gallop.    No murmur heard.  Pulmonary/Chest: No respiratory distress. He has no wheezes. He has no rales.   Abdominal: Soft. Bowel sounds are normal. He exhibits no distension and no mass. There is no rebound and no guarding.   Musculoskeletal: Normal range of motion. He exhibits no edema.   Lymphadenopathy:     He has no cervical adenopathy.   Neurological: He is alert and oriented to person, place, and time.   Skin: Skin is warm and dry.   Psychiatric: He has a normal mood and affect. His behavior is normal.       Wt Readings from Last 3 Encounters:   03/01/17 99.3 kg (218 lb 14.7 oz)   02/02/17 102.3 kg (225 lb 8.5 oz)   01/25/17 101.7 kg (224 lb 3.3 oz)     Temp Readings from Last 3 Encounters:   03/01/17 98.7 °F (37.1 °C)  (Oral)   02/02/17 97.5 °F (36.4 °C) (Oral)   01/16/17 97.9 °F (36.6 °C) (Oral)     BP Readings from Last 3 Encounters:   03/01/17 120/80   02/02/17 (!) 110/54   01/25/17 (!) 104/54     Pulse Readings from Last 3 Encounters:   03/01/17 77   02/02/17 76   01/25/17 75       Assessment:       1. Multiple myeloma in remission    2. Multiple myeloma not having achieved remission    3. Multiple myeloma, remission status unspecified      4-b12 deficiency  Plan:       Lab Results   Component Value Date    WBC 8.78 03/01/2017    HGB 11.7 (L) 03/01/2017    HCT 37.8 (L) 03/01/2017     (H) 03/01/2017     03/01/2017     Lab Results   Component Value Date    CREATININE 1.1 03/01/2017       .lstlft    His CBC is stable. SPEp pending. He will continue his revlimid/dexamethasone  B12 today/  See me in 4 weeks with a cbc and a cmp/SPEp.  Wife asked to coordinate dental appointment for Zometa clearance

## 2017-03-09 ENCOUNTER — TELEPHONE (OUTPATIENT)
Dept: HEMATOLOGY/ONCOLOGY | Facility: CLINIC | Age: 82
End: 2017-03-09

## 2017-03-09 NOTE — TELEPHONE ENCOUNTER
Mrs. Roe advised.      Mrs. Roe is extremely concerned that patient is not eating.  She thinks he is on too much medication.  Patient says that food taste bad.  She thinks he did better on physical therapy.

## 2017-03-09 NOTE — TELEPHONE ENCOUNTER
Patient's wife called today. With  Complain not eating, weakness, loosing his hair, had to increase his oxygen to 5 liters last night. I offered appointment but patient did not want to come in. [facility] health nurse Kenisha went out to assess the patient.  All vitals are good no increased swelling in extremities, lungs sound ok,  Patient complained of back pain but has not taken any pain medication today.  Wife is requesting you call  Out  A medication to help increase his appetite.

## 2017-03-09 NOTE — TELEPHONE ENCOUNTER
----- Message from Zheng Isabel MD sent at 3/9/2017  3:04 PM CST -----  Medications to stimulate appetite   in his condition have too many side effects ( ex.blood clots)  and I do not want to order them  DR ISABEL

## 2017-03-10 NOTE — TELEPHONE ENCOUNTER
Spoke with wife and advised her that physical therapy was ordered.  She said that she was taking patient off medications that he was not on prior to going to hospital  She started last night and patient was feeling better this am.

## 2017-03-13 ENCOUNTER — TELEPHONE (OUTPATIENT)
Dept: HEMATOLOGY/ONCOLOGY | Facility: CLINIC | Age: 82
End: 2017-03-13

## 2017-03-13 NOTE — TELEPHONE ENCOUNTER
----- Message from Donta Vo sent at 3/13/2017 11:57 AM CDT -----  Contact: pt  He's calling in regards to seeing the dr on 3/14/17, please advise, [phone] (home)

## 2017-03-13 NOTE — TELEPHONE ENCOUNTER
Spoke with patient. He said that his wife wanted to know which location Dr Isabel will be at tomorrow.  Advised the [facility] location.

## 2017-03-15 ENCOUNTER — TELEPHONE (OUTPATIENT)
Dept: HEMATOLOGY/ONCOLOGY | Facility: CLINIC | Age: 82
End: 2017-03-15

## 2017-03-15 DIAGNOSIS — R11.0 NAUSEA: Primary | ICD-10-CM

## 2017-03-15 RX ORDER — ONDANSETRON 4 MG/1
4 TABLET, FILM COATED ORAL EVERY 8 HOURS PRN
Qty: 20 TABLET | Refills: 0 | Status: SHIPPED | OUTPATIENT
Start: 2017-03-15 | End: 2017-10-26 | Stop reason: SDUPTHER

## 2017-03-15 NOTE — TELEPHONE ENCOUNTER
----- Message from Zheng Isabel MD sent at 3/15/2017  3:59 PM CDT -----  Those symptoms are not of someone who needs antibiotics.  If he develops fever, yellow,green or gray sputum, then call office again  Will refill Zofran for nausea  DR ISABEL

## 2017-03-15 NOTE — TELEPHONE ENCOUNTER
Wife is requesting Amoxicillin and Zofran.  Patient has itchy eyes and runny nose. Mucus is white.  Afebrile.  She said that about 10 Amoxicillin should not it out.

## 2017-03-16 ENCOUNTER — TELEPHONE (OUTPATIENT)
Dept: HEMATOLOGY/ONCOLOGY | Facility: CLINIC | Age: 82
End: 2017-03-16

## 2017-03-16 NOTE — TELEPHONE ENCOUNTER
----- Message from Deidra Robledo sent at 3/16/2017  3:06 PM CDT -----  Contact: pt  Pt wants Dr. Isaebl's nurse to give him a call, concerning pt health, can be reached at [phone]///thxMW

## 2017-03-17 ENCOUNTER — HOSPITAL ENCOUNTER (INPATIENT)
Facility: HOSPITAL | Age: 82
LOS: 3 days | Discharge: HOME HEALTH | DRG: 189 | End: 2017-03-20
Attending: EMERGENCY MEDICINE | Admitting: EMERGENCY MEDICINE
Payer: MEDICARE

## 2017-03-17 ENCOUNTER — OFFICE VISIT (OUTPATIENT)
Dept: HEMATOLOGY/ONCOLOGY | Facility: CLINIC | Age: 82
DRG: 189 | End: 2017-03-17
Payer: MEDICARE

## 2017-03-17 VITALS
TEMPERATURE: 101 F | WEIGHT: 208.31 LBS | HEIGHT: 67 IN | HEART RATE: 76 BPM | OXYGEN SATURATION: 85 % | BODY MASS INDEX: 32.7 KG/M2 | DIASTOLIC BLOOD PRESSURE: 53 MMHG | SYSTOLIC BLOOD PRESSURE: 99 MMHG | RESPIRATION RATE: 36 BRPM

## 2017-03-17 DIAGNOSIS — L89.814: ICD-10-CM

## 2017-03-17 DIAGNOSIS — E55.9 UNSPECIFIED VITAMIN D DEFICIENCY: ICD-10-CM

## 2017-03-17 DIAGNOSIS — E78.00 PURE HYPERCHOLESTEROLEMIA: Chronic | ICD-10-CM

## 2017-03-17 DIAGNOSIS — Z98.890 S/P LUMBAR DISCECTOMY: ICD-10-CM

## 2017-03-17 DIAGNOSIS — C90.00 MULTIPLE MYELOMA NOT HAVING ACHIEVED REMISSION: Primary | ICD-10-CM

## 2017-03-17 DIAGNOSIS — E03.9 HYPOTHYROIDISM, UNSPECIFIED TYPE: ICD-10-CM

## 2017-03-17 DIAGNOSIS — I10 ESSENTIAL HYPERTENSION: Chronic | ICD-10-CM

## 2017-03-17 DIAGNOSIS — F32.A DEPRESSION, UNSPECIFIED DEPRESSION TYPE: ICD-10-CM

## 2017-03-17 DIAGNOSIS — G62.9 NEUROPATHY: Chronic | ICD-10-CM

## 2017-03-17 DIAGNOSIS — J96.21 ACUTE ON CHRONIC RESPIRATORY FAILURE WITH HYPOXIA AND HYPERCAPNIA: ICD-10-CM

## 2017-03-17 DIAGNOSIS — M54.16 LUMBAR RADICULITIS: ICD-10-CM

## 2017-03-17 DIAGNOSIS — J18.9 PNEUMONIA OF LEFT LUNG DUE TO INFECTIOUS ORGANISM, UNSPECIFIED PART OF LUNG: ICD-10-CM

## 2017-03-17 DIAGNOSIS — I73.9 PERIPHERAL VASCULAR DISEASE: ICD-10-CM

## 2017-03-17 DIAGNOSIS — M15.9 GENERALIZED OA: ICD-10-CM

## 2017-03-17 DIAGNOSIS — J96.22 ACUTE ON CHRONIC RESPIRATORY FAILURE WITH HYPOXIA AND HYPERCAPNIA: ICD-10-CM

## 2017-03-17 DIAGNOSIS — E04.2 MULTIPLE THYROID NODULES: ICD-10-CM

## 2017-03-17 DIAGNOSIS — J96.21 ACUTE ON CHRONIC RESPIRATORY FAILURE WITH HYPOXIA: Primary | ICD-10-CM

## 2017-03-17 DIAGNOSIS — I50.33 ACUTE ON CHRONIC DIASTOLIC CONGESTIVE HEART FAILURE: ICD-10-CM

## 2017-03-17 DIAGNOSIS — M54.9 CHRONIC MIDLINE BACK PAIN, UNSPECIFIED BACK LOCATION: ICD-10-CM

## 2017-03-17 DIAGNOSIS — R60.0 EDEMA OF LOWER EXTREMITY, UNSPECIFIED LATERALITY: ICD-10-CM

## 2017-03-17 DIAGNOSIS — D84.9 IMMUNOCOMPROMISED PATIENT: ICD-10-CM

## 2017-03-17 DIAGNOSIS — G89.29 CHRONIC MIDLINE BACK PAIN, UNSPECIFIED BACK LOCATION: ICD-10-CM

## 2017-03-17 DIAGNOSIS — M85.80 OSTEOPENIA: ICD-10-CM

## 2017-03-17 DIAGNOSIS — J44.9 CHRONIC OBSTRUCTIVE PULMONARY DISEASE, UNSPECIFIED COPD TYPE: ICD-10-CM

## 2017-03-17 DIAGNOSIS — G72.81 CRITICAL ILLNESS MYOPATHY: ICD-10-CM

## 2017-03-17 DIAGNOSIS — D51.8 MACROCYTIC ANEMIA WITH VITAMIN B12 DEFICIENCY: ICD-10-CM

## 2017-03-17 DIAGNOSIS — C90.00 MULTIPLE MYELOMA NOT HAVING ACHIEVED REMISSION: ICD-10-CM

## 2017-03-17 DIAGNOSIS — I35.0 NONRHEUMATIC AORTIC VALVE STENOSIS: ICD-10-CM

## 2017-03-17 DIAGNOSIS — D51.8 OTHER VITAMIN B12 DEFICIENCY ANEMIA: ICD-10-CM

## 2017-03-17 DIAGNOSIS — I50.32 CHRONIC DIASTOLIC HEART FAILURE: ICD-10-CM

## 2017-03-17 DIAGNOSIS — Z98.890 S/P CERVICAL DISCECTOMY: ICD-10-CM

## 2017-03-17 DIAGNOSIS — I25.10 CHRONIC CORONARY ARTERY DISEASE: Chronic | ICD-10-CM

## 2017-03-17 DIAGNOSIS — C90.01 MULTIPLE MYELOMA IN REMISSION: ICD-10-CM

## 2017-03-17 LAB
ALBUMIN SERPL BCP-MCNC: 2.9 G/DL
ALLENS TEST: ABNORMAL
ALP SERPL-CCNC: 60 U/L
ALT SERPL W/O P-5'-P-CCNC: 20 U/L
ANION GAP SERPL CALC-SCNC: 10 MMOL/L
APTT BLDCRRT: 34.4 SEC
AST SERPL-CCNC: 16 U/L
BACTERIA #/AREA URNS HPF: NORMAL /HPF
BASOPHILS # BLD AUTO: 0.01 K/UL
BASOPHILS NFR BLD: 0.2 %
BILIRUB SERPL-MCNC: 0.5 MG/DL
BILIRUB UR QL STRIP: NEGATIVE
BNP SERPL-MCNC: 95 PG/ML
BUN SERPL-MCNC: 22 MG/DL
CALCIUM SERPL-MCNC: 9.2 MG/DL
CHLORIDE SERPL-SCNC: 94 MMOL/L
CLARITY UR: CLEAR
CO2 SERPL-SCNC: 30 MMOL/L
COLOR UR: YELLOW
CREAT SERPL-MCNC: 1.2 MG/DL
DELSYS: ABNORMAL
DIFFERENTIAL METHOD: ABNORMAL
EOSINOPHIL # BLD AUTO: 0.1 K/UL
EOSINOPHIL NFR BLD: 2.1 %
ERYTHROCYTE [DISTWIDTH] IN BLOOD BY AUTOMATED COUNT: 18.2 %
EST. GFR  (AFRICAN AMERICAN): >60 ML/MIN/1.73 M^2
EST. GFR  (NON AFRICAN AMERICAN): 56 ML/MIN/1.73 M^2
FIO2: 32
FLOW: 3
FLUAV AG SPEC QL IA: NEGATIVE
FLUBV AG SPEC QL IA: NEGATIVE
GLUCOSE SERPL-MCNC: 115 MG/DL
GLUCOSE UR QL STRIP: NEGATIVE
HCO3 UR-SCNC: 29.3 MMOL/L (ref 24–28)
HCT VFR BLD AUTO: 36.4 %
HGB BLD-MCNC: 11.5 G/DL
HGB UR QL STRIP: NEGATIVE
HYALINE CASTS #/AREA URNS LPF: 0 /LPF
INR PPP: 1.8
KETONES UR QL STRIP: NEGATIVE
LACTATE SERPL-SCNC: 0.9 MMOL/L
LEUKOCYTE ESTERASE UR QL STRIP: NEGATIVE
LYMPHOCYTES # BLD AUTO: 1.1 K/UL
LYMPHOCYTES NFR BLD: 15.8 %
MCH RBC QN AUTO: 30.3 PG
MCHC RBC AUTO-ENTMCNC: 31.6 %
MCV RBC AUTO: 96 FL
MICROSCOPIC COMMENT: NORMAL
MODE: ABNORMAL
MONOCYTES # BLD AUTO: 1.3 K/UL
MONOCYTES NFR BLD: 19.2 %
NEUTROPHILS # BLD AUTO: 4.2 K/UL
NEUTROPHILS NFR BLD: 63.3 %
NITRITE UR QL STRIP: NEGATIVE
PCO2 BLDA: 44.4 MMHG (ref 35–45)
PH SMN: 7.43 [PH] (ref 7.35–7.45)
PH UR STRIP: 5 [PH] (ref 5–8)
PLATELET # BLD AUTO: 195 K/UL
PMV BLD AUTO: 9.3 FL
PO2 BLDA: 67 MMHG (ref 80–100)
POC BE: 5 MMOL/L
POC SATURATED O2: 93 % (ref 95–100)
POTASSIUM SERPL-SCNC: 4.1 MMOL/L
PROT SERPL-MCNC: 7.3 G/DL
PROT UR QL STRIP: ABNORMAL
PROTHROMBIN TIME: 12.2 SEC
RBC # BLD AUTO: 3.8 M/UL
RBC #/AREA URNS HPF: 0 /HPF (ref 0–4)
SAMPLE: ABNORMAL
SITE: ABNORMAL
SODIUM SERPL-SCNC: 134 MMOL/L
SP GR UR STRIP: 1.02 (ref 1–1.03)
SPECIMEN SOURCE: NORMAL
TROPONIN I SERPL DL<=0.01 NG/ML-MCNC: 0.02 NG/ML
TROPONIN I SERPL DL<=0.01 NG/ML-MCNC: 0.02 NG/ML
TROPONIN I SERPL DL<=0.01 NG/ML-MCNC: 0.03 NG/ML
URN SPEC COLLECT METH UR: ABNORMAL
UROBILINOGEN UR STRIP-ACNC: NEGATIVE EU/DL
WBC # BLD AUTO: 6.66 K/UL
WBC #/AREA URNS HPF: 0 /HPF (ref 0–5)

## 2017-03-17 PROCEDURE — 27000221 HC OXYGEN, UP TO 24 HOURS

## 2017-03-17 PROCEDURE — 82803 BLOOD GASES ANY COMBINATION: CPT

## 2017-03-17 PROCEDURE — 81000 URINALYSIS NONAUTO W/SCOPE: CPT

## 2017-03-17 PROCEDURE — 96366 THER/PROPH/DIAG IV INF ADDON: CPT

## 2017-03-17 PROCEDURE — 25000003 PHARM REV CODE 250: Performed by: EMERGENCY MEDICINE

## 2017-03-17 PROCEDURE — 63600175 PHARM REV CODE 636 W HCPCS: Performed by: NURSE PRACTITIONER

## 2017-03-17 PROCEDURE — 99999 PR PBB SHADOW E&M-EST. PATIENT-LVL V: CPT | Mod: PBBFAC,,, | Performed by: INTERNAL MEDICINE

## 2017-03-17 PROCEDURE — 25000003 PHARM REV CODE 250: Performed by: NURSE PRACTITIONER

## 2017-03-17 PROCEDURE — 3078F DIAST BP <80 MM HG: CPT | Mod: S$GLB,,, | Performed by: INTERNAL MEDICINE

## 2017-03-17 PROCEDURE — 94640 AIRWAY INHALATION TREATMENT: CPT

## 2017-03-17 PROCEDURE — 83880 ASSAY OF NATRIURETIC PEPTIDE: CPT

## 2017-03-17 PROCEDURE — 99214 OFFICE O/P EST MOD 30 MIN: CPT | Mod: S$GLB,,, | Performed by: INTERNAL MEDICINE

## 2017-03-17 PROCEDURE — 1160F RVW MEDS BY RX/DR IN RCRD: CPT | Mod: S$GLB,,, | Performed by: INTERNAL MEDICINE

## 2017-03-17 PROCEDURE — 84484 ASSAY OF TROPONIN QUANT: CPT | Mod: 91

## 2017-03-17 PROCEDURE — 1159F MED LIST DOCD IN RCRD: CPT | Mod: S$GLB,,, | Performed by: INTERNAL MEDICINE

## 2017-03-17 PROCEDURE — 80053 COMPREHEN METABOLIC PANEL: CPT

## 2017-03-17 PROCEDURE — 3074F SYST BP LT 130 MM HG: CPT | Mod: S$GLB,,, | Performed by: INTERNAL MEDICINE

## 2017-03-17 PROCEDURE — 96365 THER/PROPH/DIAG IV INF INIT: CPT

## 2017-03-17 PROCEDURE — 21400001 HC TELEMETRY ROOM

## 2017-03-17 PROCEDURE — 85610 PROTHROMBIN TIME: CPT

## 2017-03-17 PROCEDURE — 87040 BLOOD CULTURE FOR BACTERIA: CPT

## 2017-03-17 PROCEDURE — 1157F ADVNC CARE PLAN IN RCRD: CPT | Mod: S$GLB,,, | Performed by: INTERNAL MEDICINE

## 2017-03-17 PROCEDURE — 25000242 PHARM REV CODE 250 ALT 637 W/ HCPCS: Performed by: NURSE PRACTITIONER

## 2017-03-17 PROCEDURE — 99499 UNLISTED E&M SERVICE: CPT | Mod: S$GLB,,, | Performed by: INTERNAL MEDICINE

## 2017-03-17 PROCEDURE — 99285 EMERGENCY DEPT VISIT HI MDM: CPT | Mod: 25

## 2017-03-17 PROCEDURE — 85730 THROMBOPLASTIN TIME PARTIAL: CPT

## 2017-03-17 PROCEDURE — 87400 INFLUENZA A/B EACH AG IA: CPT

## 2017-03-17 PROCEDURE — 1125F AMNT PAIN NOTED PAIN PRSNT: CPT | Mod: S$GLB,,, | Performed by: INTERNAL MEDICINE

## 2017-03-17 PROCEDURE — 63600175 PHARM REV CODE 636 W HCPCS: Performed by: EMERGENCY MEDICINE

## 2017-03-17 PROCEDURE — 83605 ASSAY OF LACTIC ACID: CPT

## 2017-03-17 PROCEDURE — 25000242 PHARM REV CODE 250 ALT 637 W/ HCPCS: Performed by: EMERGENCY MEDICINE

## 2017-03-17 PROCEDURE — 36600 WITHDRAWAL OF ARTERIAL BLOOD: CPT

## 2017-03-17 PROCEDURE — 93010 ELECTROCARDIOGRAM REPORT: CPT | Mod: ,,, | Performed by: INTERNAL MEDICINE

## 2017-03-17 PROCEDURE — 96367 TX/PROPH/DG ADDL SEQ IV INF: CPT

## 2017-03-17 PROCEDURE — 99900035 HC TECH TIME PER 15 MIN (STAT)

## 2017-03-17 PROCEDURE — 85025 COMPLETE CBC W/AUTO DIFF WBC: CPT

## 2017-03-17 RX ORDER — PANTOPRAZOLE SODIUM 40 MG/1
40 TABLET, DELAYED RELEASE ORAL DAILY
Status: DISCONTINUED | OUTPATIENT
Start: 2017-03-17 | End: 2017-03-20 | Stop reason: HOSPADM

## 2017-03-17 RX ORDER — LANOLIN ALCOHOL/MO/W.PET/CERES
400 CREAM (GRAM) TOPICAL 2 TIMES DAILY
Status: DISCONTINUED | OUTPATIENT
Start: 2017-03-17 | End: 2017-03-20 | Stop reason: HOSPADM

## 2017-03-17 RX ORDER — ASPIRIN 81 MG/1
81 TABLET ORAL DAILY
Status: DISCONTINUED | OUTPATIENT
Start: 2017-03-17 | End: 2017-03-20 | Stop reason: HOSPADM

## 2017-03-17 RX ORDER — ATENOLOL 50 MG/1
50 TABLET ORAL DAILY
Status: DISCONTINUED | OUTPATIENT
Start: 2017-03-17 | End: 2017-03-20 | Stop reason: HOSPADM

## 2017-03-17 RX ORDER — ACYCLOVIR 400 MG/1
400 TABLET ORAL 2 TIMES DAILY
Status: DISCONTINUED | OUTPATIENT
Start: 2017-03-17 | End: 2017-03-20 | Stop reason: HOSPADM

## 2017-03-17 RX ORDER — GUAIFENESIN 600 MG/1
600 TABLET, EXTENDED RELEASE ORAL 2 TIMES DAILY
Status: DISCONTINUED | OUTPATIENT
Start: 2017-03-17 | End: 2017-03-20 | Stop reason: HOSPADM

## 2017-03-17 RX ORDER — CLONAZEPAM 0.5 MG/1
0.5 TABLET ORAL DAILY
Status: DISCONTINUED | OUTPATIENT
Start: 2017-03-18 | End: 2017-03-20 | Stop reason: HOSPADM

## 2017-03-17 RX ORDER — IPRATROPIUM BROMIDE AND ALBUTEROL SULFATE 2.5; .5 MG/3ML; MG/3ML
3 SOLUTION RESPIRATORY (INHALATION) EVERY 8 HOURS
Status: DISCONTINUED | OUTPATIENT
Start: 2017-03-17 | End: 2017-03-20 | Stop reason: HOSPADM

## 2017-03-17 RX ORDER — MIDODRINE HYDROCHLORIDE 2.5 MG/1
5 TABLET ORAL 2 TIMES DAILY WITH MEALS
Status: DISCONTINUED | OUTPATIENT
Start: 2017-03-17 | End: 2017-03-20 | Stop reason: HOSPADM

## 2017-03-17 RX ORDER — FLUTICASONE PROPIONATE AND SALMETEROL 250; 50 UG/1; UG/1
1 POWDER RESPIRATORY (INHALATION) 2 TIMES DAILY
Status: CANCELLED | OUTPATIENT
Start: 2017-03-17

## 2017-03-17 RX ORDER — ROSUVASTATIN CALCIUM 10 MG/1
10 TABLET, COATED ORAL NIGHTLY
Status: DISCONTINUED | OUTPATIENT
Start: 2017-03-17 | End: 2017-03-20 | Stop reason: HOSPADM

## 2017-03-17 RX ORDER — ACETAMINOPHEN 325 MG/1
650 TABLET ORAL EVERY 8 HOURS PRN
Status: DISCONTINUED | OUTPATIENT
Start: 2017-03-17 | End: 2017-03-20 | Stop reason: HOSPADM

## 2017-03-17 RX ORDER — ARFORMOTEROL TARTRATE 15 UG/2ML
15 SOLUTION RESPIRATORY (INHALATION) EVERY 12 HOURS
Status: DISCONTINUED | OUTPATIENT
Start: 2017-03-17 | End: 2017-03-20 | Stop reason: HOSPADM

## 2017-03-17 RX ORDER — FUROSEMIDE 40 MG/1
40 TABLET ORAL 2 TIMES DAILY
Status: DISCONTINUED | OUTPATIENT
Start: 2017-03-17 | End: 2017-03-20 | Stop reason: HOSPADM

## 2017-03-17 RX ORDER — TAMSULOSIN HYDROCHLORIDE 0.4 MG/1
0.4 CAPSULE ORAL DAILY
Status: DISCONTINUED | OUTPATIENT
Start: 2017-03-17 | End: 2017-03-20 | Stop reason: HOSPADM

## 2017-03-17 RX ORDER — FLUTICASONE PROPIONATE 50 MCG
1 SPRAY, SUSPENSION (ML) NASAL DAILY
Status: DISCONTINUED | OUTPATIENT
Start: 2017-03-17 | End: 2017-03-20 | Stop reason: HOSPADM

## 2017-03-17 RX ORDER — LEVOTHYROXINE SODIUM 25 UG/1
25 TABLET ORAL DAILY
Status: DISCONTINUED | OUTPATIENT
Start: 2017-03-17 | End: 2017-03-20 | Stop reason: HOSPADM

## 2017-03-17 RX ORDER — BUDESONIDE 0.5 MG/2ML
0.5 INHALANT ORAL EVERY 12 HOURS
Status: DISCONTINUED | OUTPATIENT
Start: 2017-03-17 | End: 2017-03-20 | Stop reason: HOSPADM

## 2017-03-17 RX ORDER — CLOPIDOGREL BISULFATE 75 MG/1
75 TABLET ORAL DAILY
Status: DISCONTINUED | OUTPATIENT
Start: 2017-03-17 | End: 2017-03-20 | Stop reason: HOSPADM

## 2017-03-17 RX ORDER — ENOXAPARIN SODIUM 100 MG/ML
40 INJECTION SUBCUTANEOUS EVERY 24 HOURS
Status: DISCONTINUED | OUTPATIENT
Start: 2017-03-17 | End: 2017-03-20 | Stop reason: HOSPADM

## 2017-03-17 RX ORDER — DOCUSATE SODIUM 100 MG/1
100 CAPSULE, LIQUID FILLED ORAL 2 TIMES DAILY
Status: DISCONTINUED | OUTPATIENT
Start: 2017-03-17 | End: 2017-03-20 | Stop reason: HOSPADM

## 2017-03-17 RX ORDER — BUPROPION HYDROCHLORIDE 75 MG/1
75 TABLET ORAL DAILY
Status: DISCONTINUED | OUTPATIENT
Start: 2017-03-17 | End: 2017-03-20 | Stop reason: HOSPADM

## 2017-03-17 RX ORDER — ONDANSETRON 2 MG/ML
4 INJECTION INTRAMUSCULAR; INTRAVENOUS EVERY 12 HOURS PRN
Status: DISCONTINUED | OUTPATIENT
Start: 2017-03-17 | End: 2017-03-20 | Stop reason: HOSPADM

## 2017-03-17 RX ORDER — ACETAMINOPHEN 500 MG
1000 TABLET ORAL
Status: COMPLETED | OUTPATIENT
Start: 2017-03-17 | End: 2017-03-17

## 2017-03-17 RX ORDER — IPRATROPIUM BROMIDE AND ALBUTEROL SULFATE 2.5; .5 MG/3ML; MG/3ML
3 SOLUTION RESPIRATORY (INHALATION) EVERY 4 HOURS PRN
Status: DISCONTINUED | OUTPATIENT
Start: 2017-03-17 | End: 2017-03-20 | Stop reason: HOSPADM

## 2017-03-17 RX ORDER — IPRATROPIUM BROMIDE AND ALBUTEROL SULFATE 2.5; .5 MG/3ML; MG/3ML
3 SOLUTION RESPIRATORY (INHALATION)
Status: DISPENSED | OUTPATIENT
Start: 2017-03-17 | End: 2017-03-17

## 2017-03-17 RX ADMIN — FUROSEMIDE 40 MG: 40 TABLET ORAL at 09:03

## 2017-03-17 RX ADMIN — VANCOMYCIN HYDROCHLORIDE 1500 MG: 1 INJECTION, POWDER, LYOPHILIZED, FOR SOLUTION INTRAVENOUS at 02:03

## 2017-03-17 RX ADMIN — IPRATROPIUM BROMIDE AND ALBUTEROL SULFATE 3 ML: .5; 3 SOLUTION RESPIRATORY (INHALATION) at 01:03

## 2017-03-17 RX ADMIN — BUDESONIDE 0.5 MG: 0.5 SUSPENSION RESPIRATORY (INHALATION) at 06:03

## 2017-03-17 RX ADMIN — ENOXAPARIN SODIUM 40 MG: 100 INJECTION SUBCUTANEOUS at 05:03

## 2017-03-17 RX ADMIN — ACETAMINOPHEN 1000 MG: 500 TABLET ORAL at 01:03

## 2017-03-17 RX ADMIN — MAGNESIUM OXIDE TAB 400 MG (241.3 MG ELEMENTAL MG) 400 MG: 400 (241.3 MG) TAB at 09:03

## 2017-03-17 RX ADMIN — IPRATROPIUM BROMIDE AND ALBUTEROL SULFATE 3 ML: .5; 3 SOLUTION RESPIRATORY (INHALATION) at 06:03

## 2017-03-17 RX ADMIN — MIDODRINE HYDROCHLORIDE 5 MG: 5 TABLET ORAL at 05:03

## 2017-03-17 RX ADMIN — PANTOPRAZOLE SODIUM 40 MG: 40 TABLET, DELAYED RELEASE ORAL at 05:03

## 2017-03-17 RX ADMIN — PIPERACILLIN SODIUM AND TAZOBACTAM SODIUM 4.5 G: 4; .5 INJECTION, POWDER, LYOPHILIZED, FOR SOLUTION INTRAVENOUS at 09:03

## 2017-03-17 RX ADMIN — PIPERACILLIN SODIUM AND TAZOBACTAM SODIUM 4.5 G: 4; .5 INJECTION, POWDER, LYOPHILIZED, FOR SOLUTION INTRAVENOUS at 01:03

## 2017-03-17 RX ADMIN — DOCUSATE SODIUM 100 MG: 100 CAPSULE, LIQUID FILLED ORAL at 09:03

## 2017-03-17 RX ADMIN — ACYCLOVIR 400 MG: 400 TABLET ORAL at 09:03

## 2017-03-17 RX ADMIN — ROSUVASTATIN CALCIUM 10 MG: 10 TABLET, FILM COATED ORAL at 09:03

## 2017-03-17 RX ADMIN — ARFORMOTEROL TARTRATE 15 MCG: 15 SOLUTION RESPIRATORY (INHALATION) at 06:03

## 2017-03-17 RX ADMIN — PANTOPRAZOLE SODIUM 600 MG: 40 TABLET, DELAYED RELEASE ORAL at 09:03

## 2017-03-17 NOTE — MR AVS SNAPSHOT
[facility]  [address]  [place] [address]  Phone: [phone]  Fax: [phone]                  Lorne Roe   3/17/2017 11:20 AM   Office Visit    Description:  Male : 1935   Provider:  Dixie Vaughn MD   Department:  [facility]                To Do List           Future Appointments        Provider Department Dept Phone    3/30/2017 10:00 AM LABORATORY, [facility] [phone]    3/30/2017 10:20 AM Zheng Isabel MD [facility] Hemotology Oncology [phone]    3/30/2017 10:45 AM CHAIR 04 [facility] [phone]    2017 10:00 AM [facility] [phone]    2017 10:20 AM Shayan Szymanski MD [facility] Pulmonary Services [phone]      Goals (5 Years of Data)     None      [facility] On Call     [facility] Nurse Care Line -  Assistance  Registered nurses in the [facility] provide clinical advisement, health education, appointment booking, and other advisory services.  Call for this free service at [phone].             Medications           Message regarding Medications     Verify the changes and/or additions to your medication regime listed below are the same as discussed with your clinician today.  If any of these changes or additions are incorrect, please notify your healthcare provider.        STOP taking these medications     clonazePAM (KLONOPIN) 0.25 MG TbDL Take 1 tablet (0.25 mg total) by mouth 2 (two) times daily.    hydrOXYzine HCl (ATARAX) 10 MG Tab Take 1 tablet (10 mg total) by mouth 3 (three) times daily as needed.    isosorbide mononitrate (IMDUR) 30 MG 24 hr tablet Take 30 mg by mouth.           Verify that the below list of medications is an accurate representation of the medications you are currently taking.  If none reported, the list may be blank. If incorrect, please contact your healthcare provider. Carry  this list with you in case of emergency.           Current Medications     acyclovir (ZOVIRAX) 400 MG tablet Take 1 tablet (400 mg total) by mouth 2 (two) times daily.    albuterol 90 mcg/actuation inhaler Inhale 2 puffs into the lungs every 6 (six) hours as needed for Shortness of Breath.    albuterol-ipratropium 2.5mg-0.5mg/3mL (DUO-NEB) 0.5 mg-3 mg(2.5 mg base)/3 mL nebulizer solution Take 3 mLs by nebulization every 6 (six) hours while awake.    ammonium lactate (AMLACTIN) 12 % lotion Use daily.  Apply to damp skin after bathing.    aspirin (ECOTRIN) 81 MG EC tablet Take 1 tablet (81 mg total) by mouth once daily.    atenolol (TENORMIN) 25 MG tablet Take 0.5 tablets (12.5 mg total) by mouth once daily.    buPROPion (WELLBUTRIN) 75 MG tablet Take 1 tablet (75 mg total) by mouth once daily.    clonazePAM (KLONOPIN) 0.5 MG tablet Take 1 tablet (0.5 mg total) by mouth once daily.    clopidogrel (PLAVIX) 75 mg tablet Take 1 tablet (75 mg total) by mouth once daily.    clotrimazole (LOTRIMIN) 1 % cream Apply topically as needed.    cyanocobalamin 1,000 mcg/mL injection Inject 1 mL (1,000 mcg total) into the muscle every 30 days.    dexamethasone (DECADRON) 4 MG Tab 5 tablets po bid one day a week every week    docusate sodium (COLACE) 100 MG capsule Take 1 capsule (100 mg total) by mouth 2 (two) times daily.    ergocalciferol (VITAMIN D2) 50,000 unit Cap TAKE 1 CAPSULE EVERY 7 DAYS    fluticasone (FLONASE) 50 mcg/actuation nasal spray 1 spray by Each Nare route once daily.    fluticasone-salmeterol 250-50 mcg/dose (ADVAIR DISKUS) 250-50 mcg/dose diskus inhaler Inhale 1 puff into the lungs 2 (two) times daily.    furosemide (LASIX) 40 MG tablet Take 1 tablet (40 mg total) by mouth 2 (two) times daily.    gabapentin (NEURONTIN) 600 MG tablet TAKE 1 TABLET TWICE A DAY AND 2 TABLETS AT BEDTIME    lenalidomide (REVLIMID) 10 mg Cap One tablet daily from day 1 to day 21 then 7 days off    levothyroxine (SYNTHROID) 25 MCG  "tablet Take 1 tablet (25 mcg total) by mouth once daily.    magnesium oxide (MAG-OX) 400 mg tablet Take 1 tablet (400 mg total) by mouth 2 (two) times daily.    meloxicam (MOBIC) 7.5 MG tablet Take 7.5 mg by mouth.    midodrine (PROAMATINE) 2.5 MG Tab Take 2 tablets (5 mg total) by mouth 2 (two) times daily with meals.    nitroGLYCERIN (NITROSTAT) 0.4 MG SL tablet Place 1 tablet (0.4 mg total) under the tongue every 5 (five) minutes as needed.    nystatin (MYCOSTATIN) cream Apply topically 2 (two) times daily.    nystatin (MYCOSTATIN) powder Apply topically 2 (two) times daily as needed.    ondansetron (ZOFRAN) 4 MG tablet Take 1 tablet (4 mg total) by mouth every 8 (eight) hours as needed for Nausea.    oxycodone-acetaminophen (PERCOCET)  mg per tablet Take 1 tablet by mouth every 6 (six) hours as needed for Pain.    oxycodone-acetaminophen (PERCOCET)  mg per tablet Take 1 tablet by mouth every 6 (six) hours as needed for Pain.    pantoprazole (PROTONIX) 40 MG tablet Take 1 tablet (40 mg total) by mouth once daily.    promethazine-dextromethorphan (PROMETHAZINE-DM) 6.25-15 mg/5 mL Syrp Take by mouth.    rosuvastatin (CRESTOR) 10 MG tablet Take 1 tablet (10 mg total) by mouth every evening.    tamsulosin (FLOMAX) 0.4 mg Cp24 Take 1 capsule (0.4 mg total) by mouth once daily.           Clinical Reference Information           Your Vitals Were     BP Pulse Temp Resp Height Weight    99/53 (BP Location: Right arm, Patient Position: Sitting, BP Method: Manual) 76 100.6 °F (38.1 °C) (Oral) 36 5' 7" (1.702 m) 94.5 kg (208 lb 5.4 oz)    SpO2 BMI             85% 32.63 kg/m2         Blood Pressure          Most Recent Value    BP  (!)  99/53      Allergies as of 3/17/2017     Adhesive    Benzalkonium Chloride    Cephalexin    Gramicidin D    Neomycin-bacitracin-polymyxin      Immunizations Administered on Date of Encounter - 3/17/2017     None      [facility] Sign-Up     Activating your [facility] account is as " easy as 1-2-3!     1) Visit [email or web address], select Sign Up Now, enter this activation code and your date of birth, then select Next.  [email or web address]  Expires: 5/1/2017 11:37 AM      2) Create a username and password to use when you visit [facility] in the future and select a security question in case you lose your password and select Next.    3) Enter your e-mail address and click Sign Up!    Additional Information  If you have questions, please e-mail [email or web address] or call [phone] to talk to our [facility] staff. Remember, [facility] is NOT to be used for urgent needs. For medical emergencies, dial 911.         Language Assistance Services     ATTENTION: Language assistance services are available, free of charge. Please call [phone].      ATENCIÓN: Si habla español, tiene a yates disposición servicios gratuitos de asistencia lingüística. Llame al [phone].     [facility] Ý: N?u b?n nói Ti?ng Vi?t, có các d?ch v? h? tr? ngôn ng? mi?n phí dành cho b?n. G?i s? [phone].         [facility] complies with applicable Federal civil rights laws and does not discriminate on the basis of race, color, national origin, age, disability, or sex.

## 2017-03-17 NOTE — PROGRESS NOTES
Subjective:       Patient ID: Lorne Roe is a 82 y.o. male.    Chief Complaint: No chief complaint on file.    HPI This is a 82 year-old  gentleman who comes today for urgent visit regarding multiple myeloma. He has been on treatment with RevDex for the past several months. He comes in today with c/o fevers, weakness, decreased appetite, cough productive of white sputum. He has been drinking fluids well, but did have a few days of diarrhea as well as nausea. He has chronic respiratory failue and sees Dr. Szymanski. His wife had to increase his supplemental O2 recently, from 3L to 4-5L due to O2 sats fo 79% at home. They keep getting an error message that he is breathing too fast.     ALLERGIES: see med card  MEDICATIONS: See Med Card.    PAST MEDICAL HISTORY:  1. Smoldering multiple myeloma.  2. Obesity.  3. Chronic back pain with degenerative disk disease.  4. Coronary artery disease, status post bypass and coronary stents and  previous heart attacks.  5. High blood pressure.  6. Elevated cholesterol.  7. Peripheral vascular disease -- carotid stenosis.  8. History of leg edema.  9. Arthritis.  10.COPD/chronci respiratory failure  11-b12 deficiency    PREVIOUS SURGERIES: Coronary bypass surgery in 2003. Facial surgery  following an accident many years ago. Lower back surgery in 1998.    SOCIAL HISTORY: He is  with no children. He lives in [place].  He smoked until 1997 averaging two packs a day. He did this for 50 years.  He used to drink up to two cases of beer per week but stopped in 1995. He  used to be in the [employer] and retired from it and also worked as a riverboat  captain for several years.                  Family History   Problem Relation Age of Onset    Heart disease Father       Melanoma Neg Hx       Psoriasis Neg Hx       Lupus Neg Hx       Eczema Neg Hx                Review of Systems   Constitutional: Positive for fatigue.   Eyes: Negative.    Respiratory:        Chronic SOB    "  Cardiovascular: Negative.  Negative for chest pain.   Gastrointestinal: Negative for abdominal pain and nausea.   Genitourinary: Negative.  Negative for hematuria.   Musculoskeletal: Negative.    Skin: Negative.    Neurological: Negative.    Psychiatric/Behavioral: Negative.        Objective:     BP (!) 99/53 (BP Location: Right arm, Patient Position: Sitting, BP Method: Manual)  Pulse 76  Temp (!) 100.6 °F (38.1 °C) (Oral)   Resp (!) 36  Ht 5' 7" (1.702 m)  Wt 94.5 kg (208 lb 5.4 oz)  SpO2 (!) 85%  BMI 32.63 kg/m2    Physical Exam   Constitutional: He is oriented to person, place, and time. He appears well-developed and well-nourished.   Overweight     HENT:   Head: Normocephalic.   Mouth/Throat: No oropharyngeal exudate.   Eyes: Pupils are equal, round, and reactive to light.   Neck: No thyromegaly present.   Cardiovascular: Normal rate, regular rhythm and normal heart sounds.  Exam reveals no gallop.    No murmur heard.  Pulmonary/Chest: No respiratory distress. He has no wheezes. He has no rales.   Abdominal: Soft. Bowel sounds are normal. He exhibits no distension and no mass. There is no rebound and no guarding.   Musculoskeletal: Normal range of motion. He exhibits no edema.   Lymphadenopathy:     He has no cervical adenopathy.   Neurological: He is alert and oriented to person, place, and time.   Skin: Skin is warm and dry.   Psychiatric: He has a normal mood and affect. His behavior is normal.       Wt Readings from Last 3 Encounters:   03/01/17 99.3 kg (218 lb 14.7 oz)   03/01/17 99.3 kg (218 lb 14.7 oz)   02/02/17 102.3 kg (225 lb 8.5 oz)     Temp Readings from Last 3 Encounters:   03/01/17 98.7 °F (37.1 °C) (Oral)   02/02/17 97.5 °F (36.4 °C) (Oral)   01/16/17 97.9 °F (36.6 °C) (Oral)     BP Readings from Last 3 Encounters:   03/01/17 120/80   02/02/17 (!) 110/54   01/25/17 (!) 104/54     Pulse Readings from Last 3 Encounters:   03/01/17 77   02/02/17 76   01/25/17 75       Assessment:       1. " Multiple myeloma not having achieved remission    2. Acute on chronic respiratory failure with hypoxia and hypercapnia    3. Macrocytic anemia with vitamin B12 deficiency       Plan:       Lab Results   Component Value Date    WBC 8.78 03/01/2017    HGB 11.7 (L) 03/01/2017    HCT 37.8 (L) 03/01/2017     (H) 03/01/2017     03/01/2017     Lab Results   Component Value Date    CREATININE 1.1 03/01/2017     1. Multiple myeloma: On RevDex with good response thus far. He will f/u with Dr. Isabel in the near future.  -- Will ask patient to hold Revlimid during current infection until cleared/discharged.    2. Hypoxic respiratory failure, acute on chronic with hypoxia and fevers: Likely due to pneumonia. Patient possibly started out with a viral illness, but now concerned for a PNA. Will refer to ER given resp rate of 36 and hypoxia with O2 sats of 85% despite being on 5L O2.   -- Blood cultures,UA, CXR  -- Start empiric broad spectrum antibiotics of Zosyn, Vanc given pt is immunocompromised and has multiple comorbidities, unstable VS.    3. Macrocytic anemia: Has h/o VitB12 deficiency but receives B12 injections monthly.

## 2017-03-17 NOTE — H&P
[facility] Hospital Medicine  History & Physical    Patient Name: Lorne oRe  MRN: [id number]  Admission Date: 3/17/2017  Attending Physician: Missy Moody MD   Primary Care Provider: Randi Bush MD         Patient information was obtained from patient and ER records.     Subjective:     Principal Problem:Acute on chronic respiratory failure    Chief Complaint:   Chief Complaint   Patient presents with    Shortness of Breath     pt sent by Dr. Vaughn for increased SOB and hypoxia.         HPI: Lorne Roe is a 81 yo male with Multiple Myeloma (on Revlimid), chronic respiratory failure (3 L NC), COPD, HTN and CAD who was advised to present by Dr. Vaughn. He c/o fever, decreased appetite, weakness, SOB, productive cough with whitish sputum and having to increase 3L oxygen to 4 to 5 L. Has been exhibiting symptoms x 8 days.  In the ED, temp 100.6. CBC notes anemia; Chem notes hyponatremia & metabolic alkalosis. Influenza pending.  CXR -  Atelectasis and/or infiltrate in the left lung base at the costophrenic angle.          Past Medical History:   Diagnosis Date    B12 deficiency anemia     COPD (chronic obstructive pulmonary disease)     Coronary artery disease     [facility] cabg[facility]    Depression     Hypertension     Multiple myeloma     Osteopenia     Paraproteinemia     Dr alvarado    PVD (peripheral vascular disease)     Dr Argueta    Vitamin D deficiency disease        Past Surgical History:   Procedure Laterality Date    BACK SURGERY      CAROTID ENDARTERECTOMY Left 12/17/13    left    CORONARY ANGIOPLASTY WITH STENT PLACEMENT      CORONARY ARTERY BYPASS GRAFT      stent/guidand multi-link duet-safe to 1.5T    NECK SURGERY         Review of patient's allergies indicates:   Allergen Reactions    Adhesive     Benzalkonium chloride     Cephalexin      Other reaction(s): Diarrhea    Gramicidin d     Neomycin-bacitracin-polymyxin      Other  reaction(s): Unknown       No current facility-administered medications on file prior to encounter.      Current Outpatient Prescriptions on File Prior to Encounter   Medication Sig    acyclovir (ZOVIRAX) 400 MG tablet Take 1 tablet (400 mg total) by mouth 2 (two) times daily.    albuterol 90 mcg/actuation inhaler Inhale 2 puffs into the lungs every 6 (six) hours as needed for Shortness of Breath.    albuterol-ipratropium 2.5mg-0.5mg/3mL (DUO-NEB) 0.5 mg-3 mg(2.5 mg base)/3 mL nebulizer solution Take 3 mLs by nebulization every 6 (six) hours while awake.    ammonium lactate (AMLACTIN) 12 % lotion Use daily.  Apply to damp skin after bathing.    aspirin (ECOTRIN) 81 MG EC tablet Take 1 tablet (81 mg total) by mouth once daily.    atenolol (TENORMIN) 25 MG tablet Take 0.5 tablets (12.5 mg total) by mouth once daily. (Patient taking differently: Take 50 mg by mouth once daily. )    buPROPion (WELLBUTRIN) 75 MG tablet Take 1 tablet (75 mg total) by mouth once daily.    clonazePAM (KLONOPIN) 0.5 MG tablet Take 1 tablet (0.5 mg total) by mouth once daily.    clopidogrel (PLAVIX) 75 mg tablet Take 1 tablet (75 mg total) by mouth once daily.    clotrimazole (LOTRIMIN) 1 % cream Apply topically as needed.    cyanocobalamin 1,000 mcg/mL injection Inject 1 mL (1,000 mcg total) into the muscle every 30 days.    dexamethasone (DECADRON) 4 MG Tab 5 tablets po bid one day a week every week    docusate sodium (COLACE) 100 MG capsule Take 1 capsule (100 mg total) by mouth 2 (two) times daily.    ergocalciferol (VITAMIN D2) 50,000 unit Cap TAKE 1 CAPSULE EVERY 7 DAYS    fluticasone (FLONASE) 50 mcg/actuation nasal spray 1 spray by Each Nare route once daily.    fluticasone-salmeterol 250-50 mcg/dose (ADVAIR DISKUS) 250-50 mcg/dose diskus inhaler Inhale 1 puff into the lungs 2 (two) times daily.    furosemide (LASIX) 40 MG tablet Take 1 tablet (40 mg total) by mouth 2 (two) times daily.    gabapentin (NEURONTIN) 600  MG tablet TAKE 1 TABLET TWICE A DAY AND 2 TABLETS AT BEDTIME    lenalidomide (REVLIMID) 10 mg Cap One tablet daily from day 1 to day 21 then 7 days off    levothyroxine (SYNTHROID) 25 MCG tablet Take 1 tablet (25 mcg total) by mouth once daily.    magnesium oxide (MAG-OX) 400 mg tablet Take 1 tablet (400 mg total) by mouth 2 (two) times daily.    meloxicam (MOBIC) 7.5 MG tablet Take 7.5 mg by mouth.    midodrine (PROAMATINE) 2.5 MG Tab Take 2 tablets (5 mg total) by mouth 2 (two) times daily with meals.    nitroGLYCERIN (NITROSTAT) 0.4 MG SL tablet Place 1 tablet (0.4 mg total) under the tongue every 5 (five) minutes as needed.    nystatin (MYCOSTATIN) cream Apply topically 2 (two) times daily.    nystatin (MYCOSTATIN) powder Apply topically 2 (two) times daily as needed.    ondansetron (ZOFRAN) 4 MG tablet Take 1 tablet (4 mg total) by mouth every 8 (eight) hours as needed for Nausea.    oxycodone-acetaminophen (PERCOCET)  mg per tablet Take 1 tablet by mouth every 6 (six) hours as needed for Pain.    oxycodone-acetaminophen (PERCOCET)  mg per tablet Take 1 tablet by mouth every 6 (six) hours as needed for Pain.    pantoprazole (PROTONIX) 40 MG tablet Take 1 tablet (40 mg total) by mouth once daily.    promethazine-dextromethorphan (PROMETHAZINE-DM) 6.25-15 mg/5 mL Syrp Take by mouth.    rosuvastatin (CRESTOR) 10 MG tablet Take 1 tablet (10 mg total) by mouth every evening.    tamsulosin (FLOMAX) 0.4 mg Cp24 Take 1 capsule (0.4 mg total) by mouth once daily.    [DISCONTINUED] clonazePAM (KLONOPIN) 0.25 MG TbDL Take 1 tablet (0.25 mg total) by mouth 2 (two) times daily.    [DISCONTINUED] hydrOXYzine HCl (ATARAX) 10 MG Tab Take 1 tablet (10 mg total) by mouth 3 (three) times daily as needed.    [DISCONTINUED] isosorbide mononitrate (IMDUR) 30 MG 24 hr tablet Take 30 mg by mouth.     Family History     Problem Relation (Age of Onset)    Heart disease Father        Social History Main  Topics    Smoking status: Former Smoker    Smokeless tobacco: Never Used    Alcohol use No    Drug use: No    Sexual activity: No     Review of Systems   Constitutional: Positive for appetite change, chills, fatigue and fever.   HENT: Negative for ear discharge, ear pain, rhinorrhea, sore throat and trouble swallowing.    Eyes: Negative for pain, redness and visual disturbance.   Respiratory: Positive for cough and shortness of breath. Negative for wheezing.    Cardiovascular: Negative for chest pain, palpitations and leg swelling.   Gastrointestinal: Positive for nausea. Negative for abdominal pain, constipation, diarrhea and vomiting.   Genitourinary: Negative for dysuria, flank pain, frequency and hematuria.   Musculoskeletal: Positive for back pain. Negative for arthralgias, gait problem and myalgias.   Skin: Negative for pallor, rash and wound.   Neurological: Positive for weakness. Negative for dizziness, light-headedness and headaches.   Psychiatric/Behavioral: Negative for confusion.     Objective:     Vital Signs (Most Recent):  Temp: 99.2 °F (37.3 °C) (03/17/17 1435)  Pulse: 61 (03/17/17 1427)  Resp: (!) 22 (03/17/17 1427)  BP: (!) 124/46 (03/17/17 1427)  SpO2: 95 % (03/17/17 1427) Vital Signs (24h Range):  Temp:  [99.2 °F (37.3 °C)-100.6 °F (38.1 °C)] 99.2 °F (37.3 °C)  Pulse:  [61-76] 61  Resp:  [19-36] 22  SpO2:  [85 %-96 %] 95 %  BP: ()/(38-63) 124/46     Weight: 93.9 kg (207 lb)  Body mass index is 32.42 kg/(m^2).    Physical Exam   Constitutional: He is oriented to person, place, and time. He appears well-developed and well-nourished. No distress.   HENT:   Head: Normocephalic and atraumatic.   Nose: Nose normal.   Mouth/Throat: Oropharynx is clear and moist.   Eyes: Conjunctivae and EOM are normal. Pupils are equal, round, and reactive to light. No scleral icterus.   Neck: Normal range of motion. Neck supple.   Cardiovascular: Normal rate and regular rhythm.  Exam reveals no gallop and no  friction rub.    Murmur heard.  Pulmonary/Chest: Effort normal. He has rhonchi in the right lower field.           Abdominal: Soft. Bowel sounds are normal.   obese   Musculoskeletal: Normal range of motion. He exhibits no edema or tenderness.   Lymphadenopathy:     He has no cervical adenopathy.   Neurological: He is alert and oriented to person, place, and time. He exhibits normal muscle tone.   Skin: Skin is warm and dry.   Psychiatric: He has a normal mood and affect. His behavior is normal.   Nursing note and vitals reviewed.       Significant Labs:   CBC:   Recent Labs  Lab 03/17/17  1250   WBC 6.66   HGB 11.5*   HCT 36.4*        CMP:   Recent Labs  Lab 03/17/17  1250   *   K 4.1   CL 94*   CO2 30*   *   BUN 22   CREATININE 1.2   CALCIUM 9.2   PROT 7.3   ALBUMIN 2.9*   BILITOT 0.5   ALKPHOS 60   AST 16   ALT 20   ANIONGAP 10   EGFRNONAA 56*     All pertinent labs within the past 24 hours have been reviewed.    Significant Imaging: I have reviewed all pertinent imaging results/findings within the past 24 hours.    Assessment/Plan:     * Acute on chronic respiratory failure  Supplemental oxygen to maintain sats > 88 %  Wean as appropriate      Pneumonia  supplemental oxygen  IV Vanco and Zosyn  Mucinex  Bronchodilators  Sputum culture        Immunocompromised patient  Immunocompromised due to multiple myeloma and oral chemotherapy  IV antibiotics  Blood cultures pending  Continue acyclovir      Chronic coronary artery disease  Continue ASA, Plavix, atenolol and Statin      Multiple myeloma  Hold Revlimid for now  Outpatient follow up with Oncology      COPD (chronic obstructive pulmonary disease)  Supplemental oxygen to maintain oxygen level >88 %  DuoNebs    Chronic diastolic heart failure  Compensated  Cardiac diet  Continue lasix      VTE Risk Mitigation         Ordered     enoxaparin injection 40 mg  Daily     Route:  Subcutaneous        03/17/17 5762     Medium Risk of VTE  Once       03/17/17 1558     Place sequential compression device  Until discontinued      03/17/17 1558     Place ITALO hose  Until discontinued      03/17/17 1558        Brandi Simons NP  Department of Hospital Medicine   [facility]

## 2017-03-17 NOTE — ED PROVIDER NOTES
SCRIBE #1 NOTE: I, Ha Ma, am scribing for, and in the presence of, Beltran Cao MD. I have scribed the entire note.      History      Chief Complaint   Patient presents with    Shortness of Breath     pt sent by Dr. Vaughn for increased SOB and hypoxia.        Review of patient's allergies indicates:   Allergen Reactions    Adhesive     Benzalkonium chloride     Cephalexin      Other reaction(s): Diarrhea    Gramicidin d     Neomycin-bacitracin-polymyxin      Other reaction(s): Unknown        HPI   HPI    3/17/2017, 12:26 PM   History obtained from the patient      History of Present Illness: Lorne Roe is a 82 y.o. male patient who presents to the Emergency Department for increased SOB which onset gradually yesterday. Symptoms are constant and moderate in severity. Sx are exacerbated by nothing and relieved by nothing. Associated sxs include fatigue, cough, diarrhea, generalized weakness, and decreased appetite. Family at bedside states they called Dr. Isabel's office yesterday for his sxs and they were instructed to report to the ED but pt refused. Family states they had an appointment this AM with Dr. Vaughn who instructed pt to report to the ED today for further evaluation and IV antibiotics for probable pneumonia. Dr. Vaughn's note states she recommends starting pt on zosyn and vanc in the ED. Dr. Vauhgn's note also states pt's O2 sats were 79% at home and 86% on 2 liters at her office. Pt states his temp was 100.6 at Dr. Vaughn's office as well. Family at bedside states he is usually on 3 liters of oxygen at home but states she moved it up to 5 liters for the last 2 days due to his sat dropping to 70's. Wife states his O2 sat improved when pt took deep breaths and was on 5 liters of oxygen at night. Wife states she was able to move his oxygen down to 4 liters once his sats got up to 80. Wife states his O2 sats usually run between 95-97 at home. Patient denies any fever, N/V, chills, CP,  palpitations, chest tightness, weakness/numbness, lightheadedness, dizziness, abd pain and all other sxs at this time. No further complaints or concerns at this time.     Arrival mode: Personal vehicle    PCP: Randi Bush MD       Past Medical History:  Past Medical History:   Diagnosis Date    B12 deficiency anemia     COPD (chronic obstructive pulmonary disease)     [facility]ary artery [facility]     [facility] cabg[facility]    Depression     Hypertension     Multiple myeloma     Osteopenia     Paraproteinemia     Dr alvarado    PVD (peripheral vascular disease)     Dr Argueta    Vitamin D deficiency disease        Past Surgical History:  Past Surgical History:   Procedure Laterality Date    BACK SURGERY      CAROTID ENDARTERECTOMY Left 12/17/13    left    CORONARY ANGIOPLASTY WITH STENT PLACEMENT      CORONARY ARTERY BYPASS GRAFT      stent/guidand multi-link duet-safe to 1.5T    NECK SURGERY           Family History:  Family History   Problem Relation Age of Onset    Heart disease Father     Melanoma Neg Hx     Psoriasis Neg Hx     Lupus Neg Hx     Eczema Neg Hx        Social History:  Social History     Social History Main Topics    Smoking status: Former Smoker    Smokeless tobacco: Never Used    Alcohol use No    Drug use: No    Sexual activity: No       ROS   Review of Systems   Constitutional: Positive for appetite change (decreased) and fatigue. Negative for chills and fever.        (+)generalized weakness   HENT: Negative for congestion and sore throat.    Respiratory: Positive for cough and shortness of breath. Negative for chest tightness.    Cardiovascular: Negative for chest pain.   Gastrointestinal: Positive for diarrhea. Negative for abdominal pain, nausea and vomiting.   Musculoskeletal: Negative for back pain and neck pain.   Skin: Negative for rash.   Neurological: Negative for dizziness, numbness and headaches.   Psychiatric/Behavioral: Negative  for agitation and confusion.   All other systems reviewed and are negative.      Physical Exam    Initial Vitals   BP Pulse Resp Temp SpO2   03/17/17 1217 03/17/17 1217 03/17/17 1217 03/17/17 1217 03/17/17 1217   130/63 75 20 100.6 °F (38.1 °C) 86 %      Physical Exam  Nursing Notes and Vital Signs Reviewed.  Constitutional: Patient is in no acute distress. Awake and alert. Well-developed and well-nourished.  Head: Atraumatic. Normocephalic.  Eyes: PERRL. EOM intact. Conjunctivae are not pale. No scleral icterus.  ENT: Mucous membranes are moist. Oropharynx is clear and symmetric.    Neck: Supple. Full ROM. No lymphadenopathy.  Cardiovascular: Regular rate. Regular rhythm. 3/6 systolic ejection murmur noted. No rubs or gallops appreciated. Distal pulses are 2+ and symmetric.  Pulmonary/Chest: No respiratory distress. Wheezing noted bilaterally, worse on the R. No rales or rhonchi appreciated.  Abdominal: Soft and non-distended.  There is no tenderness.  No rebound, guarding, or rigidity. Good bowel sounds.  Musculoskeletal: Moves all extremities. No obvious deformities. No edema. No calf tenderness.  Skin: Warm and dry.  Neurological:  Alert, awake, and appropriate.  Normal speech.  No acute focal neurological deficits are appreciated.  Psychiatric: Normal affect. Good eye contact. Appropriate in content.    ED Course    Procedures  ED Vital Signs:  Vitals:    03/17/17 1217 03/17/17 1302 03/17/17 1313 03/17/17 1318   BP: 130/63  (!) 121/38    Pulse: 75 68 70 68   Resp: 20 20 19    Temp: (!) 100.6 °F (38.1 °C)      TempSrc: Oral      SpO2: (!) 86% 95% 96%    Weight: 93.9 kg (207 lb)       03/17/17 1327   BP:    Pulse:    Resp:    Temp: (!) 100.6 °F (38.1 °C)   TempSrc:    SpO2:    Weight:        Abnormal Lab Results:  Labs Reviewed   CBC W/ AUTO DIFFERENTIAL - Abnormal; Notable for the following:        Result Value    RBC 3.80 (*)     Hemoglobin 11.5 (*)     Hematocrit 36.4 (*)     MCHC 31.6 (*)     RDW 18.2 (*)      Mono # 1.3 (*)     Lymph% 15.8 (*)     Mono% 19.2 (*)     All other components within normal limits   COMPREHENSIVE METABOLIC PANEL - Abnormal; Notable for the following:     Sodium 134 (*)     Chloride 94 (*)     CO2 30 (*)     Glucose 115 (*)     Albumin 2.9 (*)     eGFR if non  56 (*)     All other components within normal limits   PROTIME-INR - Abnormal; Notable for the following:     INR 1.8 (*)     All other components within normal limits   APTT - Abnormal; Notable for the following:     aPTT 34.4 (*)     All other components within normal limits   ISTAT PROCEDURE - Abnormal; Notable for the following:     POC PO2 67 (*)     POC HCO3 29.3 (*)     POC SATURATED O2 93 (*)     All other components within normal limits   CULTURE, BLOOD   CULTURE, BLOOD   LACTIC ACID, PLASMA   TROPONIN I   B-TYPE NATRIURETIC PEPTIDE   URINALYSIS        All Lab Results:  Results for orders placed or performed during the hospital encounter of 03/17/17   CBC auto differential   Result Value Ref Range    WBC 6.66 3.90 - 12.70 K/uL    RBC 3.80 (L) 4.60 - 6.20 M/uL    Hemoglobin 11.5 (L) 14.0 - 18.0 g/dL    Hematocrit 36.4 (L) 40.0 - 54.0 %    MCV 96 82 - 98 fL    MCH 30.3 27.0 - 31.0 pg    MCHC 31.6 (L) 32.0 - 36.0 %    RDW 18.2 (H) 11.5 - 14.5 %    Platelets 195 150 - 350 K/uL    MPV 9.3 9.2 - 12.9 fL    Gran # 4.2 1.8 - 7.7 K/uL    Lymph # 1.1 1.0 - 4.8 K/uL    Mono # 1.3 (H) 0.3 - 1.0 K/uL    Eos # 0.1 0.0 - 0.5 K/uL    Baso # 0.01 0.00 - 0.20 K/uL    Gran% 63.3 38.0 - 73.0 %    Lymph% 15.8 (L) 18.0 - 48.0 %    Mono% 19.2 (H) 4.0 - 15.0 %    Eosinophil% 2.1 0.0 - 8.0 %    Basophil% 0.2 0.0 - 1.9 %    Differential Method Automated    Comprehensive metabolic panel   Result Value Ref Range    Sodium 134 (L) 136 - 145 mmol/L    Potassium 4.1 3.5 - 5.1 mmol/L    Chloride 94 (L) 95 - 110 mmol/L    CO2 30 (H) 23 - 29 mmol/L    Glucose 115 (H) 70 - 110 mg/dL    BUN, Bld 22 8 - 23 mg/dL    Creatinine 1.2 0.5 - 1.4 mg/dL     Calcium 9.2 8.7 - 10.5 mg/dL    Total Protein 7.3 6.0 - 8.4 g/dL    Albumin 2.9 (L) 3.5 - 5.2 g/dL    Total Bilirubin 0.5 0.1 - 1.0 mg/dL    Alkaline Phosphatase 60 55 - 135 U/L    AST 16 10 - 40 U/L    ALT 20 10 - 44 U/L    Anion Gap 10 8 - 16 mmol/L    eGFR if African American >60 >60 mL/min/1.73 m^2    eGFR if non African American 56 (A) >60 mL/min/1.73 m^2   Lactic acid, plasma   Result Value Ref Range    Lactate (Lactic Acid) 0.9 0.5 - 2.2 mmol/L   Troponin I   Result Value Ref Range    Troponin I 0.021 0.000 - 0.026 ng/mL   B-Type natriuretic peptide (BNP)   Result Value Ref Range    BNP 95 0 - 99 pg/mL   Protime-INR   Result Value Ref Range    Prothrombin Time 12.2 9.0 - 12.5 sec    INR 1.8 (H) 0.8 - 1.2   APTT   Result Value Ref Range    aPTT 34.4 (H) 21.0 - 32.0 sec   ISTAT PROCEDURE   Result Value Ref Range    POC PH 7.428 7.35 - 7.45    POC PCO2 44.4 35 - 45 mmHg    POC PO2 67 (L) 80 - 100 mmHg    POC HCO3 29.3 (H) 24 - 28 mmol/L    POC BE 5 -2 to 2 mmol/L    POC SATURATED O2 93 (L) 95 - 100 %    Sample ARTERIAL     Site LR     Allens Test Pass     DelSys Nasal Can     Mode SPONT     Flow 3     FiO2 32          Imaging Results:  Imaging Results         X-Ray Chest AP Portable (SOB) (Final result) Result time:  03/17/17 13:05:31    Final result by Michael Quijano MD (03/17/17 13:05:31)    Impression:     Atelectasis and/or infiltrate in the left lung base at the costophrenic angle.            Electronically signed by: MICHAEL QUIJANO MD  Date:     03/17/17  Time:    13:05     Narrative:    Exam: Portable chest radiograph    Clinical History: Shortness of breath.     Comparison: Chest x-ray, 01/25/2017.    Findings:.     The right lung is clear.  In the left costophrenic angle there is some atelectasis and or infiltrate. The cardiac silhouette is within normal limits. Are wires are noted.  Anterior cervical spinal fusion hardware is noted.               The EKG was ordered, reviewed, and independently  interpreted by the ED provider.  Interpretation time: 1228  Rate: 73 BPM  Rhythm: normal sinus rhythm  Interpretation: Nonspecific ST and T wave abnormalities. Occasional PAC's. No STEMI.         The Emergency Provider reviewed the vital signs and test results, which are outlined above.    ED Discussion     1:50 PM: Dr. Cao discussed the pt's case with Dr. Vaughn (Hem Onc) who recommends admission due to pt being fragile, febrile, and he looked worse in her office.    2:04 PM: Discussed case with Yessenia (Hospital Medicine). Dr. Moody agrees with current care and management of pt and accepts admission.   Admitting Service: Hospital medicine   Admitting Physician: Dr. Moody  Admit to: Tele    2:09 PM: Re-evaluated pt. I have discussed test results, shared treatment plan, and the need for admission with patient and family at bedside. Pt and family express understanding at this time and agree with all information. All questions answered. Pt and family have no further questions or concerns at this time. Pt is ready for admit.      ED Medication(s):  Medications   vancomycin (VANCOCIN) 1,500 mg in dextrose 5 % 250 mL IVPB (not administered)   albuterol-ipratropium 2.5mg-0.5mg/3mL nebulizer solution 3 mL ( Nebulization Canceled Entry 3/17/17 1320)   piperacillin-tazobactam 4.5 g in dextrose 5 % 100 mL IVPB (ready to mix system) (4.5 g Intravenous New Bag 3/17/17 1308)   acetaminophen tablet 1,000 mg (1,000 mg Oral Given 3/17/17 1327)       New Prescriptions    No medications on file             Medical Decision Making    Medical Decision Making:   Clinical Tests:   Lab Tests: Reviewed and Ordered  Radiological Study: Ordered and Reviewed  Medical Tests: Reviewed and Ordered           Scribe Attestation:   Scribe #1: I performed the above scribed service and the documentation accurately describes the services I performed. I attest to the accuracy of the note.    Attending:   Physician Attestation Statement for Scribe #1: I,  Beltran Cao MD, personally performed the services described in this documentation, as scribed by aH Ma, in my presence, and it is both accurate and complete.          Clinical Impression       ICD-10-CM ICD-9-CM   1. Acute on chronic respiratory failure with hypoxia J96.21 518.84     799.02   2. Pneumonia of left lung due to infectious organism, unspecified part of lung J18.9 486   3. Multiple myeloma in remission C90.01 203.01       Disposition:   Disposition: Admitted (Tele)  Condition: Fair         Beltran Cao MD  03/17/17 6305

## 2017-03-17 NOTE — ASSESSMENT & PLAN NOTE
Immunocompromised due to multiple myeloma and oral chemotherapy  IV antibiotics  Blood cultures pending

## 2017-03-17 NOTE — IP AVS SNAPSHOT
[address] [place] [address]           Patient Discharge Instructions     Our goal is to set you up for success. This packet includes information on your condition, medications, and your home care. It will help you to care for yourself so you don't get sicker and need to go back to the hospital.     Please ask your nurse if you have any questions.        There are many details to remember when preparing to leave the hospital. Here is what you will need to do:    1. Take your medicine. If you are prescribed medications, review your Medication List in the following pages. You may have new medications to  at the pharmacy and others that you'll need to stop taking. Review the instructions for how and when to take your medications. Talk with your doctor or nurses if you are unsure of what to do.     2. Go to your follow-up appointments. Specific follow-up information is listed in the following pages. Your may be contacted by a transition nurse or clinical provider about future appointments. Be sure we have all of the phone numbers to reach you, if needed. Please contact your provider's office if you are unable to make an appointment.     3. Watch for warning signs. Your doctor or nurse will give you detailed warning signs to watch for and when to call for assistance. These instructions may also include educational information about your condition. If you experience any of warning signs to your health, call your doctor.               ** Verify the list of medication(s) below is accurate and up to date. Carry this with you in case of emergency. If your medications have changed, please notify your healthcare provider.             Medication List      START taking these medications        Additional Info                      levoFLOXacin 500 MG tablet   Commonly known as:  LEVAQUIN   Quantity:  10 tablet   Refills:  0   Dose:  500 mg    Instructions:  Take 1 tablet  (500 mg total) by mouth once daily.     Begin Date    AM    Noon    PM    Bedtime         CHANGE how you take these medications        Additional Info                      atenolol 25 MG tablet   Commonly known as:  TENORMIN   Quantity:  15 tablet   Refills:  0   Dose:  12.5 mg   What changed:  how much to take    Last time this was given:  50 mg on 3/20/2017  9:13 AM   Instructions:  Take 0.5 tablets (12.5 mg total) by mouth once daily.     Begin Date    AM    Noon    PM    Bedtime       nystatin cream   Commonly known as:  MYCOSTATIN   Quantity:  30 g   Refills:  0   What changed:  Another medication with the same name was removed. Continue taking this medication, and follow the directions you see here.    Instructions:  Apply topically 2 (two) times daily.     Begin Date    AM    Noon    PM    Bedtime       oxycodone-acetaminophen  mg per tablet   Commonly known as:  PERCOCET   Quantity:  60 tablet   Refills:  0   Dose:  1 tablet   What changed:  Another medication with the same name was removed. Continue taking this medication, and follow the directions you see here.    Last time this was given:  1 tablet on 3/20/2017  7:24 AM   Instructions:  Take 1 tablet by mouth every 6 (six) hours as needed for Pain.     Begin Date    AM    Noon    PM    Bedtime         CONTINUE taking these medications        Additional Info                      acyclovir 400 MG tablet   Commonly known as:  ZOVIRAX   Quantity:  60 tablet   Refills:  0   Dose:  400 mg    Last time this was given:  400 mg on 3/20/2017  9:13 AM   Instructions:  Take 1 tablet (400 mg total) by mouth 2 (two) times daily.     Begin Date    AM    Noon    PM    Bedtime       albuterol 90 mcg/actuation inhaler   Quantity:  3 Inhaler   Refills:  3   Dose:  2 puff    Instructions:  Inhale 2 puffs into the lungs every 6 (six) hours as needed for Shortness of Breath.     Begin Date    AM    Noon    PM    Bedtime       albuterol-ipratropium 2.5mg-0.5mg/3mL 0.5  mg-3 mg(2.5 mg base)/3 mL nebulizer solution   Commonly known as:  DUO-NEB   Quantity:  360 vial   Refills:  3   Dose:  3 mL    Last time this was given:  3 mLs on 3/20/2017  7:22 AM   Instructions:  Take 3 mLs by nebulization every 6 (six) hours while awake.     Begin Date    AM    Noon    PM    Bedtime       ammonium lactate 12 % lotion   Commonly known as:  AMLACTIN   Quantity:  225 g   Refills:  11    Instructions:  Use daily.  Apply to damp skin after bathing.     Begin Date    AM    Noon    PM    Bedtime       aspirin 81 MG EC tablet   Commonly known as:  ECOTRIN   Quantity:  30 tablet   Refills:  0   Dose:  81 mg    Last time this was given:  81 mg on 3/20/2017  9:13 AM   Instructions:  Take 1 tablet (81 mg total) by mouth once daily.     Begin Date    AM    Noon    PM    Bedtime       buPROPion 75 MG tablet   Commonly known as:  WELLBUTRIN   Quantity:  90 tablet   Refills:  3   Dose:  75 mg    Last time this was given:  75 mg on 3/20/2017  9:12 AM   Instructions:  Take 1 tablet (75 mg total) by mouth once daily.     Begin Date    AM    Noon    PM    Bedtime       clonazePAM 0.5 MG tablet   Commonly known as:  KLONOPIN   Quantity:  60 tablet   Refills:  0   Dose:  0.5 mg    Last time this was given:  0.5 mg on 3/20/2017  9:13 AM   Instructions:  Take 1 tablet (0.5 mg total) by mouth once daily.     Begin Date    AM    Noon    PM    Bedtime       clopidogrel 75 mg tablet   Commonly known as:  PLAVIX   Quantity:  30 tablet   Refills:  0   Dose:  75 mg    Last time this was given:  75 mg on 3/20/2017  9:13 AM   Instructions:  Take 1 tablet (75 mg total) by mouth once daily.     Begin Date    AM    Noon    PM    Bedtime       clotrimazole 1 % cream   Commonly known as:  LOTRIMIN   Quantity:  1 Tube   Refills:  3    Instructions:  Apply topically as needed.     Begin Date    AM    Noon    PM    Bedtime       cyanocobalamin 1,000 mcg/mL injection   Quantity:  10 mL   Refills:  1   Dose:  1000 mcg    Last time this  was given:  1,000 mcg on 3/19/2017  5:51 PM   Instructions:  Inject 1 mL (1,000 mcg total) into the muscle every 30 days.     Begin Date    AM    Noon    PM    Bedtime       dexamethasone 4 MG Tab   Commonly known as:  DECADRON   Quantity:  40 tablet   Refills:  6    Instructions:  5 tablets po bid one day a week every week     Begin Date    AM    Noon    PM    Bedtime       docusate sodium 100 MG capsule   Commonly known as:  COLACE   Quantity:  60 capsule   Refills:  0   Dose:  100 mg    Last time this was given:  100 mg on 3/20/2017  9:13 AM   Instructions:  Take 1 capsule (100 mg total) by mouth 2 (two) times daily.     Begin Date    AM    Noon    PM    Bedtime       ergocalciferol 50,000 unit Cap   Commonly known as:  VITAMIN D2   Quantity:  4 capsule   Refills:  0    Instructions:  TAKE 1 CAPSULE EVERY 7 DAYS     Begin Date    AM    Noon    PM    Bedtime       fluticasone 50 mcg/actuation nasal spray   Commonly known as:  FLONASE   Quantity:  1 Bottle   Refills:  0   Dose:  1 spray    Last time this was given:  1 spray on 3/20/2017  9:14 AM   Instructions:  1 spray by Each Nare route once daily.     Begin Date    AM    Noon    PM    Bedtime       fluticasone-salmeterol 250-50 mcg/dose 250-50 mcg/dose diskus inhaler   Commonly known as:  ADVAIR DISKUS   Quantity:  180 each   Refills:  3   Dose:  1 puff    Instructions:  Inhale 1 puff into the lungs 2 (two) times daily.     Begin Date    AM    Noon    PM    Bedtime       furosemide 40 MG tablet   Commonly known as:  LASIX   Quantity:  180 tablet   Refills:  4   Dose:  40 mg    Last time this was given:  40 mg on 3/20/2017  9:13 AM   Instructions:  Take 1 tablet (40 mg total) by mouth 2 (two) times daily.     Begin Date    AM    Noon    PM    Bedtime       gabapentin 600 MG tablet   Commonly known as:  NEURONTIN   Quantity:  270 tablet   Refills:  0    Instructions:  TAKE 1 TABLET TWICE A DAY AND 2 TABLETS AT BEDTIME     Begin Date    AM    Noon    PM    Bedtime        lenalidomide 10 mg Cap   Commonly known as:  REVLIMID   Quantity:  21 each   Refills:  0    Instructions:  One tablet daily from day 1 to day 21 then 7 days off     Begin Date    AM    Noon    PM    Bedtime       levothyroxine 25 MCG tablet   Commonly known as:  SYNTHROID   Quantity:  30 tablet   Refills:  0   Dose:  25 mcg    Last time this was given:  25 mcg on 3/20/2017  9:19 AM   Instructions:  Take 1 tablet (25 mcg total) by mouth once daily.     Begin Date    AM    Noon    PM    Bedtime       magnesium oxide 400 mg tablet   Commonly known as:  MAG-OX   Quantity:  60 tablet   Refills:  0   Dose:  400 mg    Last time this was given:  400 mg on 3/20/2017  9:13 AM   Instructions:  Take 1 tablet (400 mg total) by mouth 2 (two) times daily.     Begin Date    AM    Noon    PM    Bedtime       meloxicam 7.5 MG tablet   Commonly known as:  MOBIC   Refills:  0   Dose:  7.5 mg    Instructions:  Take 7.5 mg by mouth.     Begin Date    AM    Noon    PM    Bedtime       midodrine 2.5 MG Tab   Commonly known as:  PROAMATINE   Quantity:  60 tablet   Refills:  0   Dose:  5 mg    Last time this was given:  5 mg on 3/20/2017  7:25 AM   Instructions:  Take 2 tablets (5 mg total) by mouth 2 (two) times daily with meals.     Begin Date    AM    Noon    PM    Bedtime       nitroGLYCERIN 0.4 MG SL tablet   Commonly known as:  NITROSTAT   Quantity:  30 tablet   Refills:  3   Dose:  0.4 mg    Instructions:  Place 1 tablet (0.4 mg total) under the tongue every 5 (five) minutes as needed.     Begin Date    AM    Noon    PM    Bedtime       ondansetron 4 MG tablet   Commonly known as:  ZOFRAN   Quantity:  20 tablet   Refills:  0   Dose:  4 mg    Instructions:  Take 1 tablet (4 mg total) by mouth every 8 (eight) hours as needed for Nausea.     Begin Date    AM    Noon    PM    Bedtime       pantoprazole 40 MG tablet   Commonly known as:  PROTONIX   Quantity:  30 tablet   Refills:  0   Dose:  40 mg    Last time this was given:  40 mg  on 3/20/2017  9:12 AM   Instructions:  Take 1 tablet (40 mg total) by mouth once daily.     Begin Date    AM    Noon    PM    Bedtime       promethazine-dextromethorphan 6.25-15 mg/5 mL Syrp   Commonly known as:  PROMETHAZINE-DM   Refills:  0    Instructions:  Take by mouth.     Begin Date    AM    Noon    PM    Bedtime       rosuvastatin 10 MG tablet   Commonly known as:  CRESTOR   Quantity:  30 tablet   Refills:  0   Dose:  10 mg    Last time this was given:  10 mg on 3/19/2017  8:21 PM   Instructions:  Take 1 tablet (10 mg total) by mouth every evening.     Begin Date    AM    Noon    PM    Bedtime       tamsulosin 0.4 mg Cp24   Commonly known as:  FLOMAX   Quantity:  30 capsule   Refills:  0   Dose:  0.4 mg    Last time this was given:  0.4 mg on 3/20/2017  9:12 AM   Instructions:  Take 1 capsule (0.4 mg total) by mouth once daily.     Begin Date    AM    Noon    PM    Bedtime            Where to Get Your Medications      These medications were sent to [facility] - [address] , [facility] [address]     Phone:  [phone]     levoFLOXacin 500 MG tablet                  Please bring to all follow up appointments:    1. A copy of your discharge instructions.  2. All medicines you are currently taking in their original bottles.  3. Identification and insurance card.    Please arrive 15 minutes ahead of scheduled appointment time.    Please call 24 hours in advance if you must reschedule your appointment and/or time.        Your Scheduled Appointments     Mar 30, 2017 10:00 AM CDT   Non-Fasting Lab with LABORATORY, [facility] ([facility])    [address] [facility] [address] [place] [address]   [phone]            Mar 30, 2017 10:20 AM CDT   Established Patient Visit with Zheng Isabel MD   [facility] Hem[facility]cology ([facility])    [address] [facility] [address] [place] [address]   [phone]            Mar 30, 2017 10:45 AM CDT   Infusion 15 Min  with CHAIR 04 [facility] ([facility])    [address] [place] [address]   [phone]            Jul 26, 2017 10:00 AM CDT   Diagnostic Xray with [facility] XR2   [facility] ([facility])    [address] [place] [address]   [phone]            Jul 26, 2017 10:20 AM CDT   Established Patient Visit with Shayan Szymanski MD   [facility] ([facility])    [address] [place] [address]   [phone]              Follow-up Information     Follow up with Randi Bush MD In 1 week.    Specialty:  Family Medicine    Contact information:    [address] [place] [address]  [phone]          Follow up with Zheng Isabel MD In 3 days.    Specialty:  Hematology and Oncology    Contact information:    [address] [place] [address]  [phone]          Follow up with Randi Bush MD.    Specialty:  Family Medicine    Contact information:    [address] [place] [address]  [phone]        Referrals     Future Orders    Ambulatory referral to Home Health     Ambulatory referral to Outpatient Case Management     Questions:    Does the patient have a chronic or uncontrolled disease process?:      Does the patient have a new diagnosis of a catastrophic or life altering illness/treatment?:      Does the patient have any psycho-social issues that may affect their ability to adhere to treatment plan?:      Does patient have any behaviors or circumstances that may impede ability to adhere to treatment plan?:      Is patient at risk for admission/readmission?:          Discharge Instructions     Future Orders    Activity as tolerated     Diet general     Questions:    Total calories:      Fat restriction, if any:      Protein restriction, if any:      Na restriction, if any:  2gNa    Fluid restriction:      Additional restrictions:  Cardiac (Low Na/Chol)        Primary Diagnosis     Your primary diagnosis was:  Acute  "And Chronic Respiratory Failure      Admission Information     Date & Time Provider Department CSN    3/17/2017 12:18 PM Missy Moody MD [facility] [id number]      Care Providers     Provider Role Specialty Primary office phone    Missy Moody MD Attending Provider General Practice [phone]    Dixie Vaughn MD Consulting Physician  Hematology and Oncology [phone]    Missy Moody MD Team Attending  General Practice [phone]      Your Vitals Were     BP Pulse Temp Resp Height Weight    104/52 (BP Location: Left arm, Patient Position: Lying, BP Method: Automatic) 85 97.7 °F (36.5 °C) (Oral) 18 5' 7" (1.702 m) 92.4 kg (203 lb 9.6 oz)    SpO2 BMI             94% 31.89 kg/m2         Recent Lab Values        1/21/2009 4/21/2009 7/22/2009 10/9/2009 1/5/2010 4/8/2010            9:12 AM  9:04 AM  9:58 AM 10:20 AM  9:35 AM  9:27 AM      A1C 6.4 (H) 6.5 (H) 6.4 (H) 6.5 (H) 6.4 (H) 6.5 (H)             Pending Labs     Order Current Status    Blood culture #1 Preliminary result    Blood culture #2 Preliminary result      Allergies as of 3/20/2017        Reactions    Adhesive     Benzalkonium Chloride     Cephalexin     Other reaction(s): Diarrhea    Gramicidin D     Neomycin-bacitracin-polymyxin     Other reaction(s): Unknown      [facility]     [facility] Nurse Care Line - 24/7 Assistance  Unless otherwise directed by your provider, please contact [facility], our nurse care line that is available for 24/7 assistance.     Registered nurses in the [facility] provide clinical advisement, health education, appointment booking, and other advisory services.  Call for this free service at [phone].        Advance Directives     An advance directive is a document which, in the event you are no longer able to make decisions for yourself, tells your healthcare team what kind of treatment you do or do not want to receive, or who you would like to make those decisions for you. "  If you do not currently have an advance directive, [facility] encourages you to create one.  For more information call:  [phone]), [phone] ([phone]),  or log on to [email or web address].        Language Assistance Services     ATTENTION: Language assistance services are available, free of charge. Please call [phone].      ATENCIÓN: Si habla español, tiene a yates disposición servicios gratuitos de asistencia lingüística. Llame al [phone].     CHÚ Ý: N?u b?n nói Ti?ng Vi?t, có các d?ch v? h? tr? ngôn ng? mi?n phí dành cho b?n. G?i s? [phone].        Stroke Education              Heart Failure Education       Heart Failure: Being Active  You have a condition called heart failure. Being active doesnt mean that you have to wear yourself out. Even a little movement each day helps to strengthen your heart. If you cant get out to exercise, you can do simple stretching and strengthening exercises at home. These are good ways to keep you well-conditioned and prevent you and your heart from becoming excessively weak.    Ideas to get you started  · Add a little movement to things you do now. Walk to mail letters. Park your car at the far end of the parking lot and walk to the store. Walk up a flight of stairs instead of taking the elevator.  · Choose activities you enjoy. You might walk, swim, or ride an exercise bike. Things like gardening and washing the car count, too. Other possibilities include: washing dishes, walking the dog, walking around the mall, and doing aerobic activities with friends.  · Join a group exercise program at a [facility], a senior center, or a community center. Or look into a hospital cardiac rehabilitation program. Ask your doctor if you qualify.  Tips to keep you going  · Get up and get dressed each day. Go to a coffee shop and read a newspaper or go somewhere that you'll be in the presence of other active people. Youll feel more like being  active.  · Make a plan. Choose one or more activities that you enjoy and that you can easily do. Then plan to do at least one each day. You might write your plan on a calendar.  · Go with a friend or a group if you like company. This can help you feel supported and stay motivated, too.  · Plan social events that you enjoy. This will keep you mentally engaged as well as physically motivated to do things you find pleasure in.  For your safety  · Talk with your healthcare provider before starting an exercise program.  · Exercise indoors when its too hot or too cold outside, or when the air quality is poor. Try walking at a shopping mall.  · Wear socks and sturdy shoes to maintain your balance and prevent falls.  · Start slowly. Do a few minutes several times a day at first. Increase your time and speed little by little.  · Stop and rest whenever you feel tired or get short of breath.  · Dont push yourself on days when you dont feel well.  Date Last Reviewed: 3/20/2016  © 9288-4842 [employer]. [address] [place] [address]. All rights reserved. This information is not intended as a substitute for professional medical care. Always follow your healthcare professional's instructions.              Heart Failure: Evaluating Your Heart  You have a condition called heart failure. To evaluate your condition, your doctor will examine you, ask questions, and do some tests. Along with looking for signs of heart failure, the doctor looks for any other health problems that may have led to heart failure. The results of your evaluation will help your doctor form a treatment plan.  Health history and physical exam  Your visit will start with a health history. Tell the doctor about any symptoms youve noticed and about all medicines you take. Then youll have a physical exam. This includes listening to your heartbeat and breathing. Youll also be checked for swelling (edema) in your legs and neck. When you  have fluid buildup or fluid in the lungs, it may be called congestive heart failure.  Diagnosing heart failure     During an echocardiogram, sound waves bounce off the heart. These are converted into a picture on the screen.   The following may be done to help your doctor form a diagnosis:  · X-rays show the size and shape of your heart. These pictures can also show fluid in your lungs.  · An electrocardiogram (ECG or EKG) shows the pattern of your heartbeat. Small pads (electrodes) are placed on your chest, arms, and legs. Wires connect the pads to the ECG machine, which records your hearts electrical signals. This can give the doctor information about heart function.  · An echocardiogram uses ultrasound waves to show the structure and movement of your heart muscle. This shows how well the heart pumps. It also shows the thickness of the heart walls, and if the heart is enlarged. It is one of the most useful, non-invasive tests as it provides information about the heart's general function. This helps your doctor make treatment decisions.  · Lab tests evaluate small amounts of blood or urine for signs of problems. A BNP lab test can help diagnose and evaluate heart failure. BNP stands for B-type natriuretic peptide. The ventricles secrete more BNP when heart failure worsens. Lab tests can also provide information about metabolic dysfunction or heart dysfunction.  Your treatment plan  Based on the results of your evaluation and tests, your doctor will develop a treatment plan. This plan is designed to relieve some of your heart failure symptoms and help make you more comfortable. Your treatment plan may include:  · Medicine to help your heart work better and improve your quality of life  · Changes in what you eat and drink to help prevent fluid from backing up in your body  · Daily monitoring of your weight and heart failure symptoms to see how well your treatment plan is working  · Exercise to help you stay  healthy  · Help with quitting smoking  · Emotional and psychological support to help adjust to the changes  · Referrals to other specialists to make sure you are being treated comprehensively  Date Last Reviewed: 3/21/2016  © 1512-4199 [employer]. [address] [place] [address], [place] [address]. All rights reserved. This information is not intended as a substitute for professional medical care. Always follow your healthcare professional's instructions.              Heart Failure: Making Changes to Your Diet  You have a condition called heart failure. When you have heart failure, excess fluid is more likely to build up in your body because your heart isn't working well. This makes the heart work harder to pump blood. Fluid buildup causes symptoms such as shortness of breath and swelling (edema). This is often referred to as congestive heart failure or CHF. Controlling the amount of salt (sodium) you eat may help stop fluid from building up. Your doctor may also tell you to reduce the amount of fluid you drink.  Reading food labels    Your healthcare provider will tell you how much sodium you can eat each day. Read food labels to keep track. Keep in mind that certain foods are high in salt. These include canned, frozen, and processed foods. Check the amount of sodium in each serving. Watch out for high-sodium ingredients. These include MSG (monosodium glutamate), baking soda, and sodium phosphate.   Eating less salt  Give yourself time to get used to eating less salt. It may take a little while. Here are some tips to help:  · Take the saltshaker off the table. Replace it with salt-free herb mixes and spices.  · Eat fresh or plain frozen vegetables. These have much less salt than canned vegetables.  · Choose low-sodium snacks like sodium-free pretzels, crackers, or air-popped popcorn.  · Dont add salt to your food when youre cooking. Instead, season your foods with pepper, lemon, garlic, or onion.  · When  you eat out, ask that your food be cooked without added salt.  · Avoid eating fried foods as these often have a great deal of salt.  If youre told to limit fluids  You may need to limit how much fluid you have to help prevent swelling. This includes anything that is liquid at room temperature, such as ice cream and soup. If your doctor tells you to limit fluid, try these tips:  · Measure drinks in a measuring cup before you drink them. This will help you meet daily goals.  · Chill drinks to make them more refreshing.  · Suck on frozen lemon wedges to quench thirst.  · Only drink when youre thirsty.  · Chew sugarless gum or suck on hard candy to keep your mouth moist.  · Weigh yourself daily to know if your body's fluid content is rising.  My sodium goal  Your healthcare provider may give you a sodium goal to meet each day. This includes sodium found in food as well as salt that you add. My goal is to eat no more than ___________ mg of sodium per day.     When to call your doctor  Call your doctor right away if you have any symptoms of worsening heart failure. These can include:  · Sudden weight gain  · Increased swelling of your legs or ankles  · Trouble breathing when youre resting or at night  · Increase in the number of pillows you have to sleep on  · Chest pain, pressure, discomfort, or pain in the jaw, neck, or back   Date Last Reviewed: 3/21/2016  © 4088-9571 [employer]. [address] [place] [address], [place] [address]. All rights reserved. This information is not intended as a substitute for professional medical care. Always follow your healthcare professional's instructions.              Heart Failure: Medicines to Help Your Heart    You have a condition called heart failure (also known as congestive heart failure, or CHF). Your doctor will likely prescribe medicines for heart failure and any underlying health problems you have. Most heart failure patients take one or more types of medicinen. Your  healthcare provider will work to find the combination of medicines that works best for you.  Heart failure medicines  Here are the most common heart failure medicines:  · ACE inhibitors lower blood pressure and decrease strain on the heart. This makes it easier for the heart to pump. Angiotensin receptor blockers have similar effects. These are prescribed for some patients instead of ACE inhibitors.  · Beta-blockers relieve stress on the heart. They also improve symptoms. They may also improve the heart's pumping action over time.  · Diuretics (also called water pills) help rid your body of excess water. This can help rid your body of swelling (edema). Having less fluid to pump means your heart doesnt have to work as hard. Some diuretics make your body lose a mineral called potassium. Your doctor will tell you if you need to take supplements or eat more foods high in potassium.  · Digoxin helps your heart pump with more strength. This helps your heart pump more blood with each beat. So, more oxygen-rich blood travels to the rest of the body.  · Aldosterone antagonists help alter hormones and decrease strain on the heart.  · Hydralazine and nitrates are two separate medicines used together to treat heart failure. They may come in one combination pill. They lower blood pressure and decrease how hard the heart has to pump.  Medicines for related conditions  Controlling other heart problems helps keep heart failure under control, too. Depending on other heart problems you have, medicines may be prescribed to:  · Lower blood pressure (antihypertensives).  · Lower cholesterol levels (statins).  · Prevent blood clots (anticoagulants or aspirin).  · Keep the heartbeat steady (antiarrhythmics).  Date Last Reviewed: 3/5/2016  © 2150-8743 [employer]. [address] [place] [address], [place] [address]. All rights reserved. This information is not intended as a substitute for professional medical care. Always follow  your healthcare professional's instructions.              Heart Failure: Procedures That May Help    The heart is a muscle that pumps oxygen-rich blood to all parts of the body. When you have heart failure, the heart is not able to pump as well as it should. Blood and fluid may back up into the lungs (congestive heart failure), and some parts of the body dont get enough oxygen-rich blood to work normally. These problems lead to the symptoms of heart failure.     Certain procedures may help the heart pump better in some cases of heart failure. Some procedures are done to treat health problems that may have caused the heart failure such as coronary artery disease or heart rhythm problems. For more serious heart failure, other options are available.  Treating artery and valve problems  If you have coronary artery disease or valve disease, procedures may be done to improve blood flow. This helps the heart pump better, which can improve heart failure symptoms. First, your doctor may do a cardiac catheterization to help detect clogged blood vessels or valve damage. During this procedure, a  thin tube (catheter) in inserted into a blood vessel and guided to the heart. There a dye is injected and a special type of X-ray (angiogram) is taken of the blood vessels. Procedures to open a blocked artery or fix damaged valves can also be done using catheterization.  · Angioplasty uses a balloon-tipped instrument at the end of the catheter. The balloon is inflated to widen the narrowed artery. In many cases, a stent is expanded to further support the narrowed artery. A stent is a metal mesh tube.  · Valve surgery repairs or replacement of faulty valves can also be done during catheterization so blood can flow properly through the chambers of the heart.  Bypass surgery is another option to help treat blocked arteries. It uses a healthy blood vessel from elsewhere in the body. The healthy blood vessel is attached above and below the  blocked area so that blood can flow around the blocked artery.  Treating heart rhythm problems  A device may be placed in the chest to help a weak heart maintain a healthy, heartbeat so the heart can pump more effectively:  · Pacemaker. A pacemaker is an implanted device that regulates your heartbeat electronically. It monitors your heart's rhythm and generates a painless electric impulse that helps the heart beat in a regular rhythm. A pacemaker is programmed to meet your specific heart rhythm needs.  · Biventricular pacing/cardiac resynchronization therapy. A type of pacemaker that paces both pumping chambers of the heart at the same time to coordinate contractions and to improve the heart's function. Some people with heart failure are candidates for this therapy.  · Implantable cardioverter defibrillator. A device similar to a pacemaker that senses when the heart is beating too fast and delivers an electrical shock to convert the fast rhythm to a normal rhythm. This can be a life saving device.  In severe cases  In more serious cases of heart failure when other treatments no longer work, other options may include:  · Ventricular assist devices (VADs). These are mechanical devices used to take over the pumping function for one or both of the heart's ventricles, or pumping chambers. A VAD may be necessary when heart failure progresses to the point that medicines and other treatments no longer help. In some cases, a VAD may be used as a bridge to transplant.  · Heart transplant. This is replacing the diseased heart with a healthy one from a donor. This is an option for a few people who are very sick. A heart transplant is very serious and not an option for all patients. Your doctor can tell you more.  Date Last Reviewed: 3/20/2016  © 1032-1879 [employer]. [address] [place] [address], [place] [address]. All rights reserved. This information is not intended as a substitute for professional medical care.  Always follow your healthcare professional's instructions.              Heart Failure: Tracking Your Weight  You have a condition called heart failure. When you have heart failure, a sudden weight gain or a steady rise in weight is a warning sign that your body is retaining too much water and salt. This could mean your heart failure is getting worse. If left untreated, it can cause problems for your lungs and result in shortness of breath. Weighing yourself each day is the best way to know if youre retaining water. If your weight goes up quickly, call your doctor. You will be given instructions on how to get rid of the excess water. You will likely need medicines and to avoid salt. This will help your heart work better.  Call your doctor if you gain more than 2 pounds in 1 day, more than 5 pounds in 1 week, or whatever weight gain you were told to report by your doctor. This is often a sign of worsening heart failure and needs to be evaluated and treated. Your doctor will tell you what to do next.   Tips for weighing yourself    · Weigh yourself at the same time each morning, wearing the same clothes. Weigh yourself after urinating and before eating.  · Use the same scale each day. Make sure the numbers are easy to read. Put the scale on a flat, hard surface -- not on a rug or carpet.  · Do not stop weighing yourself. If you forget one day, weigh again the next morning.  How to use your weight chart  · Keep your weight chart near the scale. Write your weight on the chart as soon as you get off the scale.  · Fill in the month and the start date on the chart. Then write down your weight each day. Your chart will look like this:    · If you miss a day, leave the space blank. Weigh yourself the next day and write your weight in the next space.  · Take your weight chart with you when you go to see your doctor.  Date Last Reviewed: 3/20/2016  © 6035-6167 [employer]. [address] [place] [address], [place]  [address]. All rights reserved. This information is not intended as a substitute for professional medical care. Always follow your healthcare professional's instructions.              Heart Failure: Warning Signs of a Flare-Up  You have a condition called heart failure. Once you have heart failure, flare-ups can happen. Below are signs that can mean your heart failure is getting worse. If you notice any of these warning signs, call your healthcare provider.  Swelling    · Your feet, ankles, or lower legs get puffier.  · You notice skin changes on your lower legs.  · Your shoes feel too tight.  · Your clothes are tighter in the waist.  · You have trouble getting rings on or off your fingers.  Shortness of breath  · You have to breathe harder even when youre doing your normal activities or when youre resting.  · You are short of breath walking up stairs or even short distances.  · You wake up at night short of breath or coughing.  · You need to use more pillows or sit up to sleep.  · You wake up tired or restless.  Other warning signs  · You feel weaker, dizzy, or more tired.  · You have chest pain or changes in your heartbeat.  · You have a cough that wont go away.  · You cant remember things or dont feel like eating.  Tracking your weight  Gaining weight is often the first warning sign that heart failure is getting worse. Gaining even a few pounds can be a sign that your body is retaining excess water and salt. Weighing yourself each day in the morning after you urinate and before you eat, is the best way to know if you're retaining water. Get a scale that is easy to read and make sure you wear the same clothes and use the same scale every time you weigh. Your healthcare provider will show you how to track your weight. Call your doctor if you gain more than 2 pounds in 1 day, 5 pounds in 1 week, or whatever weight gain you were told to report by your doctor. This is often a sign of worsening heart failure and needs  to be evaluated and treated before it compromises your breathing. Your doctor will tell you what to do next.    Date Last Reviewed: 3/15/2016  © 4403-1814 [employer]. [address] [place] [address], [place] [address]. All rights reserved. This information is not intended as a substitute for professional medical care. Always follow your healthcare professional's instructions.              Pneumonmia Discharge Instructions                [facility] Sign-Up     Activating your [facility] account is as easy as 1-2-3!     1) Visit [email or web address], select Sign Up Now, enter this activation code and your date of birth, then select Next.  [email or web address]  Expires: 5/1/2017 11:37 AM      2) Create a username and password to use when you visit [facility] in the future and select a security question in case you lose your password and select Next.    3) Enter your e-mail address and click Sign Up!    Additional Information  If you have questions, please e-mail [email or web address] or call [phone] to talk to our [facility] staff. Remember, [facility] is NOT to be used for urgent needs. For medical emergencies, dial 911.          [facility] complies with applicable Federal civil rights laws and does not discriminate on the basis of race, color, national origin, age, disability, or sex.

## 2017-03-17 NOTE — CONSULTS
Pharmacy Vancomycin Consult Note    WBC=6.66 Wyve=478.6  CrCl=51.8ml/min Scr=1.2  ABW=93.9kg IBW=66.1kg DBW=77.2    Cultures: pending  Dx. Pneumonia  Goal trough=15-20mcg/ml    Patient received Vanc 1500mg one time dose in ER.  Continue with Vanc 1250mg Q18.  Trough due 3/19 @0130 before 3rd dose.    Thank you for allowing us to participate in this patient's care.    Violet Vickers, Pharm D 3/17/2017 4:19 PM

## 2017-03-17 NOTE — SUBJECTIVE & OBJECTIVE
Past Medical History:   Diagnosis Date    B12 deficiency anemia     COPD (chronic obstructive pulmonary disease)     Coronary artery disease     s/p cabg[facility] cardiac stents [facility]    Depression     Hypertension     Multiple myeloma     Osteopenia     Paraproteinemia     Dr alvarado    PVD (peripheral vascular disease)     Dr Argueta    Vitamin D deficiency disease        Past Surgical History:   Procedure Laterality Date    BACK SURGERY      CAROTID ENDARTERECTOMY Left 12/17/13    left    CORONARY ANGIOPLASTY WITH STENT PLACEMENT      CORONARY ARTERY BYPASS GRAFT      stent/guidand multi-link duet-safe to 1.5T    NECK SURGERY         Review of patient's allergies indicates:   Allergen Reactions    Adhesive     Benzalkonium chloride     Cephalexin      Other reaction(s): Diarrhea    Gramicidin d     Neomycin-bacitracin-polymyxin      Other reaction(s): Unknown       No current facility-administered medications on file prior to encounter.      Current Outpatient Prescriptions on File Prior to Encounter   Medication Sig    acyclovir (ZOVIRAX) 400 MG tablet Take 1 tablet (400 mg total) by mouth 2 (two) times daily.    albuterol 90 mcg/actuation inhaler Inhale 2 puffs into the lungs every 6 (six) hours as needed for Shortness of Breath.    albuterol-ipratropium 2.5mg-0.5mg/3mL (DUO-NEB) 0.5 mg-3 mg(2.5 mg base)/3 mL nebulizer solution Take 3 mLs by nebulization every 6 (six) hours while awake.    ammonium lactate (AMLACTIN) 12 % lotion Use daily.  Apply to damp skin after bathing.    aspirin (ECOTRIN) 81 MG EC tablet Take 1 tablet (81 mg total) by mouth once daily.    atenolol (TENORMIN) 25 MG tablet Take 0.5 tablets (12.5 mg total) by mouth once daily. (Patient taking differently: Take 50 mg by mouth once daily. )    buPROPion (WELLBUTRIN) 75 MG tablet Take 1 tablet (75 mg total) by mouth once daily.    clonazePAM (KLONOPIN) 0.5 MG tablet Take 1 tablet (0.5 mg total) by  mouth once daily.    clopidogrel (PLAVIX) 75 mg tablet Take 1 tablet (75 mg total) by mouth once daily.    clotrimazole (LOTRIMIN) 1 % cream Apply topically as needed.    cyanocobalamin 1,000 mcg/mL injection Inject 1 mL (1,000 mcg total) into the muscle every 30 days.    dexamethasone (DECADRON) 4 MG Tab 5 tablets po bid one day a week every week    docusate sodium (COLACE) 100 MG capsule Take 1 capsule (100 mg total) by mouth 2 (two) times daily.    ergocalciferol (VITAMIN D2) 50,000 unit Cap TAKE 1 CAPSULE EVERY 7 DAYS    fluticasone (FLONASE) 50 mcg/actuation nasal spray 1 spray by Each Nare route once daily.    fluticasone-salmeterol 250-50 mcg/dose (ADVAIR DISKUS) 250-50 mcg/dose diskus inhaler Inhale 1 puff into the lungs 2 (two) times daily.    furosemide (LASIX) 40 MG tablet Take 1 tablet (40 mg total) by mouth 2 (two) times daily.    gabapentin (NEURONTIN) 600 MG tablet TAKE 1 TABLET TWICE A DAY AND 2 TABLETS AT BEDTIME    lenalidomide (REVLIMID) 10 mg Cap One tablet daily from day 1 to day 21 then 7 days off    levothyroxine (SYNTHROID) 25 MCG tablet Take 1 tablet (25 mcg total) by mouth once daily.    magnesium oxide (MAG-OX) 400 mg tablet Take 1 tablet (400 mg total) by mouth 2 (two) times daily.    meloxicam (MOBIC) 7.5 MG tablet Take 7.5 mg by mouth.    midodrine (PROAMATINE) 2.5 MG Tab Take 2 tablets (5 mg total) by mouth 2 (two) times daily with meals.    nitroGLYCERIN (NITROSTAT) 0.4 MG SL tablet Place 1 tablet (0.4 mg total) under the tongue every 5 (five) minutes as needed.    nystatin (MYCOSTATIN) cream Apply topically 2 (two) times daily.    nystatin (MYCOSTATIN) powder Apply topically 2 (two) times daily as needed.    ondansetron (ZOFRAN) 4 MG tablet Take 1 tablet (4 mg total) by mouth every 8 (eight) hours as needed for Nausea.    oxycodone-acetaminophen (PERCOCET)  mg per tablet Take 1 tablet by mouth every 6 (six) hours as needed for Pain.     oxycodone-acetaminophen (PERCOCET)  mg per tablet Take 1 tablet by mouth every 6 (six) hours as needed for Pain.    pantoprazole (PROTONIX) 40 MG tablet Take 1 tablet (40 mg total) by mouth once daily.    promethazine-dextromethorphan (PROMETHAZINE-DM) 6.25-15 mg/5 mL Syrp Take by mouth.    rosuvastatin (CRESTOR) 10 MG tablet Take 1 tablet (10 mg total) by mouth every evening.    tamsulosin (FLOMAX) 0.4 mg Cp24 Take 1 capsule (0.4 mg total) by mouth once daily.    [DISCONTINUED] clonazePAM (KLONOPIN) 0.25 MG TbDL Take 1 tablet (0.25 mg total) by mouth 2 (two) times daily.    [DISCONTINUED] hydrOXYzine HCl (ATARAX) 10 MG Tab Take 1 tablet (10 mg total) by mouth 3 (three) times daily as needed.    [DISCONTINUED] isosorbide mononitrate (IMDUR) 30 MG 24 hr tablet Take 30 mg by mouth.     Family History     Problem Relation (Age of Onset)    Heart disease Father        Social History Main Topics    Smoking status: Former Smoker    Smokeless tobacco: Never Used    Alcohol use No    Drug use: No    Sexual activity: No     Review of Systems   Constitutional: Positive for appetite change, chills, fatigue and fever.   HENT: Negative for ear discharge, ear pain, rhinorrhea, sore throat and trouble swallowing.    Eyes: Negative for pain, redness and visual disturbance.   Respiratory: Positive for cough and shortness of breath. Negative for wheezing.    Cardiovascular: Negative for chest pain, palpitations and leg swelling.   Gastrointestinal: Positive for nausea. Negative for abdominal pain, constipation, diarrhea and vomiting.   Genitourinary: Negative for dysuria, flank pain, frequency and hematuria.   Musculoskeletal: Positive for back pain. Negative for arthralgias, gait problem and myalgias.   Skin: Negative for pallor, rash and wound.   Neurological: Positive for weakness. Negative for dizziness, light-headedness and headaches.   Psychiatric/Behavioral: Negative for confusion.     Objective:     Vital  Signs (Most Recent):  Temp: 99.2 °F (37.3 °C) (03/17/17 1435)  Pulse: 61 (03/17/17 1427)  Resp: (!) 22 (03/17/17 1427)  BP: (!) 124/46 (03/17/17 1427)  SpO2: 95 % (03/17/17 1427) Vital Signs (24h Range):  Temp:  [99.2 °F (37.3 °C)-100.6 °F (38.1 °C)] 99.2 °F (37.3 °C)  Pulse:  [61-76] 61  Resp:  [19-36] 22  SpO2:  [85 %-96 %] 95 %  BP: ()/(38-63) 124/46     Weight: 93.9 kg (207 lb)  Body mass index is 32.42 kg/(m^2).    Physical Exam   Constitutional: He is oriented to person, place, and time. He appears well-developed and well-nourished. No distress.   HENT:   Head: Normocephalic and atraumatic.   Nose: Nose normal.   Mouth/Throat: Oropharynx is clear and moist.   Eyes: Conjunctivae and EOM are normal. Pupils are equal, round, and reactive to light. No scleral icterus.   Neck: Normal range of motion. Neck supple.   Cardiovascular: Normal rate and regular rhythm.  Exam reveals no gallop and no friction rub.    Murmur heard.  Pulmonary/Chest: Effort normal. He has rhonchi in the right lower field.           Abdominal: Soft. Bowel sounds are normal.   obese   Musculoskeletal: Normal range of motion. He exhibits no edema or tenderness.   Lymphadenopathy:     He has no cervical adenopathy.   Neurological: He is alert and oriented to person, place, and time. He exhibits normal muscle tone.   Skin: Skin is warm and dry.   Psychiatric: He has a normal mood and affect. His behavior is normal.   Nursing note and vitals reviewed.       Significant Labs:   CBC:   Recent Labs  Lab 03/17/17  1250   WBC 6.66   HGB 11.5*   HCT 36.4*        CMP:   Recent Labs  Lab 03/17/17  1250   *   K 4.1   CL 94*   CO2 30*   *   BUN 22   CREATININE 1.2   CALCIUM 9.2   PROT 7.3   ALBUMIN 2.9*   BILITOT 0.5   ALKPHOS 60   AST 16   ALT 20   ANIONGAP 10   EGFRNONAA 56*     All pertinent labs within the past 24 hours have been reviewed.    Significant Imaging: I have reviewed all pertinent imaging results/findings within the  past 24 hours.

## 2017-03-18 LAB
ALBUMIN SERPL BCP-MCNC: 2.6 G/DL
ALP SERPL-CCNC: 57 U/L
ALT SERPL W/O P-5'-P-CCNC: 17 U/L
ANION GAP SERPL CALC-SCNC: 10 MMOL/L
ANISOCYTOSIS BLD QL SMEAR: SLIGHT
AST SERPL-CCNC: 14 U/L
BASOPHILS # BLD AUTO: 0.02 K/UL
BASOPHILS NFR BLD: 0.4 %
BILIRUB SERPL-MCNC: 0.5 MG/DL
BUN SERPL-MCNC: 16 MG/DL
CALCIUM SERPL-MCNC: 8.8 MG/DL
CHLORIDE SERPL-SCNC: 96 MMOL/L
CO2 SERPL-SCNC: 29 MMOL/L
CREAT SERPL-MCNC: 0.9 MG/DL
DIFFERENTIAL METHOD: ABNORMAL
EOSINOPHIL # BLD AUTO: 0.1 K/UL
EOSINOPHIL NFR BLD: 2.6 %
ERYTHROCYTE [DISTWIDTH] IN BLOOD BY AUTOMATED COUNT: 18.3 %
EST. GFR  (AFRICAN AMERICAN): >60 ML/MIN/1.73 M^2
EST. GFR  (NON AFRICAN AMERICAN): >60 ML/MIN/1.73 M^2
GLUCOSE SERPL-MCNC: 103 MG/DL
HCT VFR BLD AUTO: 34.1 %
HGB BLD-MCNC: 10.5 G/DL
HYPOCHROMIA BLD QL SMEAR: ABNORMAL
LYMPHOCYTES # BLD AUTO: 0.8 K/UL
LYMPHOCYTES NFR BLD: 15.3 %
MCH RBC QN AUTO: 29.9 PG
MCHC RBC AUTO-ENTMCNC: 30.8 %
MCV RBC AUTO: 97 FL
MONOCYTES # BLD AUTO: 1.3 K/UL
MONOCYTES NFR BLD: 24.4 %
NEUTROPHILS # BLD AUTO: 3.1 K/UL
NEUTROPHILS NFR BLD: 57.9 %
PLATELET # BLD AUTO: 171 K/UL
PLATELET BLD QL SMEAR: ABNORMAL
PMV BLD AUTO: 9.5 FL
POLYCHROMASIA BLD QL SMEAR: ABNORMAL
POTASSIUM SERPL-SCNC: 4.1 MMOL/L
PROT SERPL-MCNC: 6.8 G/DL
RBC # BLD AUTO: 3.51 M/UL
SODIUM SERPL-SCNC: 135 MMOL/L
SPHEROCYTES BLD QL SMEAR: ABNORMAL
WBC # BLD AUTO: 5.41 K/UL

## 2017-03-18 PROCEDURE — 63600175 PHARM REV CODE 636 W HCPCS: Performed by: EMERGENCY MEDICINE

## 2017-03-18 PROCEDURE — 25000242 PHARM REV CODE 250 ALT 637 W/ HCPCS: Performed by: NURSE PRACTITIONER

## 2017-03-18 PROCEDURE — 36415 COLL VENOUS BLD VENIPUNCTURE: CPT

## 2017-03-18 PROCEDURE — 80053 COMPREHEN METABOLIC PANEL: CPT

## 2017-03-18 PROCEDURE — 85025 COMPLETE CBC W/AUTO DIFF WBC: CPT

## 2017-03-18 PROCEDURE — 99223 1ST HOSP IP/OBS HIGH 75: CPT | Mod: ,,, | Performed by: INTERNAL MEDICINE

## 2017-03-18 PROCEDURE — 25000003 PHARM REV CODE 250: Performed by: INTERNAL MEDICINE

## 2017-03-18 PROCEDURE — 63600175 PHARM REV CODE 636 W HCPCS: Performed by: NURSE PRACTITIONER

## 2017-03-18 PROCEDURE — 21400001 HC TELEMETRY ROOM

## 2017-03-18 PROCEDURE — 25000003 PHARM REV CODE 250: Performed by: EMERGENCY MEDICINE

## 2017-03-18 PROCEDURE — 27000221 HC OXYGEN, UP TO 24 HOURS

## 2017-03-18 PROCEDURE — 25000003 PHARM REV CODE 250: Performed by: NURSE PRACTITIONER

## 2017-03-18 PROCEDURE — 94640 AIRWAY INHALATION TREATMENT: CPT

## 2017-03-18 RX ORDER — OXYCODONE AND ACETAMINOPHEN 10; 325 MG/1; MG/1
1 TABLET ORAL EVERY 6 HOURS PRN
Status: DISCONTINUED | OUTPATIENT
Start: 2017-03-18 | End: 2017-03-20 | Stop reason: HOSPADM

## 2017-03-18 RX ADMIN — PIPERACILLIN SODIUM AND TAZOBACTAM SODIUM 4.5 G: 4; .5 INJECTION, POWDER, LYOPHILIZED, FOR SOLUTION INTRAVENOUS at 08:03

## 2017-03-18 RX ADMIN — ENOXAPARIN SODIUM 40 MG: 100 INJECTION SUBCUTANEOUS at 04:03

## 2017-03-18 RX ADMIN — PANTOPRAZOLE SODIUM 600 MG: 40 TABLET, DELAYED RELEASE ORAL at 08:03

## 2017-03-18 RX ADMIN — PIPERACILLIN SODIUM AND TAZOBACTAM SODIUM 4.5 G: 4; .5 INJECTION, POWDER, LYOPHILIZED, FOR SOLUTION INTRAVENOUS at 05:03

## 2017-03-18 RX ADMIN — ARFORMOTEROL TARTRATE 15 MCG: 15 SOLUTION RESPIRATORY (INHALATION) at 08:03

## 2017-03-18 RX ADMIN — TAMSULOSIN HYDROCHLORIDE 0.4 MG: 0.4 CAPSULE ORAL at 08:03

## 2017-03-18 RX ADMIN — CLOPIDOGREL BISULFATE 75 MG: 75 TABLET ORAL at 08:03

## 2017-03-18 RX ADMIN — CLONAZEPAM 0.5 MG: 0.5 TABLET ORAL at 08:03

## 2017-03-18 RX ADMIN — FUROSEMIDE 40 MG: 40 TABLET ORAL at 08:03

## 2017-03-18 RX ADMIN — MAGNESIUM OXIDE TAB 400 MG (241.3 MG ELEMENTAL MG) 400 MG: 400 (241.3 MG) TAB at 08:03

## 2017-03-18 RX ADMIN — IPRATROPIUM BROMIDE AND ALBUTEROL SULFATE 3 ML: .5; 3 SOLUTION RESPIRATORY (INHALATION) at 08:03

## 2017-03-18 RX ADMIN — BUDESONIDE 0.5 MG: 0.5 SUSPENSION RESPIRATORY (INHALATION) at 08:03

## 2017-03-18 RX ADMIN — PIPERACILLIN SODIUM AND TAZOBACTAM SODIUM 4.5 G: 4; .5 INJECTION, POWDER, LYOPHILIZED, FOR SOLUTION INTRAVENOUS at 02:03

## 2017-03-18 RX ADMIN — MIDODRINE HYDROCHLORIDE 5 MG: 5 TABLET ORAL at 04:03

## 2017-03-18 RX ADMIN — IPRATROPIUM BROMIDE AND ALBUTEROL SULFATE 3 ML: .5; 3 SOLUTION RESPIRATORY (INHALATION) at 03:03

## 2017-03-18 RX ADMIN — DOCUSATE SODIUM 100 MG: 100 CAPSULE, LIQUID FILLED ORAL at 08:03

## 2017-03-18 RX ADMIN — ROSUVASTATIN CALCIUM 10 MG: 10 TABLET, FILM COATED ORAL at 08:03

## 2017-03-18 RX ADMIN — OXYCODONE HYDROCHLORIDE AND ACETAMINOPHEN 1 TABLET: 10; 325 TABLET ORAL at 10:03

## 2017-03-18 RX ADMIN — FLUTICASONE PROPIONATE 1 SPRAY: 50 SPRAY, METERED NASAL at 08:03

## 2017-03-18 RX ADMIN — PANTOPRAZOLE SODIUM 40 MG: 40 TABLET, DELAYED RELEASE ORAL at 08:03

## 2017-03-18 RX ADMIN — ACYCLOVIR 400 MG: 400 TABLET ORAL at 08:03

## 2017-03-18 RX ADMIN — IPRATROPIUM BROMIDE AND ALBUTEROL SULFATE 3 ML: .5; 3 SOLUTION RESPIRATORY (INHALATION) at 12:03

## 2017-03-18 RX ADMIN — VANCOMYCIN HYDROCHLORIDE 1250 MG: 1 INJECTION, POWDER, LYOPHILIZED, FOR SOLUTION INTRAVENOUS at 08:03

## 2017-03-18 RX ADMIN — ARFORMOTEROL TARTRATE 15 MCG: 15 SOLUTION RESPIRATORY (INHALATION) at 07:03

## 2017-03-18 RX ADMIN — BUPROPION HYDROCHLORIDE 75 MG: 75 TABLET, FILM COATED ORAL at 08:03

## 2017-03-18 RX ADMIN — BUDESONIDE 0.5 MG: 0.5 SUSPENSION RESPIRATORY (INHALATION) at 07:03

## 2017-03-18 RX ADMIN — ASPIRIN 81 MG: 81 TABLET, COATED ORAL at 08:03

## 2017-03-18 RX ADMIN — MIDODRINE HYDROCHLORIDE 5 MG: 5 TABLET ORAL at 08:03

## 2017-03-18 RX ADMIN — LEVOTHYROXINE SODIUM 25 MCG: 25 TABLET ORAL at 08:03

## 2017-03-18 NOTE — CONSULTS
Hematology/Oncology Consult Note    Consulting Physician: Dr. Cao, Emergency Dept    Reason for consultation: Management of hypoxic resp failure and fevers in setting of Multiple myeloma    HemOnc Diagnosis: Multiple myeloma    Prior Treatment: Velcade + Dex    Current Treatment: Revlimid + Dex    Chief Complaint:  Acute on chronic respiratory failure    HPI:    Lorne Roe is a 82 y.o. male with Multiple myeloma, COPD with chronic resp failure on home O2, VitB12 deficiency, CHF admitted with acute on chronic resp failure, cough and fevers. Pt had blood cultures drawn which are negative thus far. CXR shows possible PNA and pt was started on Vanc, Zosyn. This morning, the patient states he is feeling better and his appetite is improving - after having no appetite at home for the past several days.     PAST MEDICAL HISTORY:  1. Multiple myeloma.  2. Obesity.  3. Chronic back pain with degenerative disk disease.  4. Coronary artery disease, status post bypass and coronary stents and  previous heart attacks.  5. High blood pressure.  6. Elevated cholesterol.  7. Peripheral vascular disease -- carotid stenosis.  8. History of leg edema.  9. Arthritis.  10.COPD/chronic respiratory failure  11-b12 deficiency     PREVIOUS SURGERIES: Coronary bypass surgery in 2003. Facial surgery  following an accident many years ago. Lower back surgery in 1998.     SOCIAL HISTORY: He is  with no children. He lives in [place].  He smoked until 1997 averaging two packs a day. He did this for 50 years.  He used to drink up to two cases of beer per week but stopped in 1995. He  used to be in the [employer] and retired from it and also worked as a riverboat  captain for several years.    Family HISTORY: Positive for melanoma.    Review of patient's allergies indicates:   Allergen Reactions    Adhesive     Benzalkonium chloride     Cephalexin      Other reaction(s): Diarrhea    Gramicidin d     Neomycin-bacitracin-polymyxin       Other reaction(s): Unknown       Medications:      Current Facility-Administered Medications   Medication    acetaminophen tablet 650 mg    acyclovir tablet 400 mg    albuterol-ipratropium 2.5mg-0.5mg/3mL nebulizer solution 3 mL    albuterol-ipratropium 2.5mg-0.5mg/3mL nebulizer solution 3 mL    arformoterol nebulizer solution 15 mcg    aspirin EC tablet 81 mg    atenolol tablet 50 mg    budesonide nebulizer solution 0.5 mg    buPROPion tablet 75 mg    clonazePAM tablet 0.5 mg    clopidogrel tablet 75 mg    docusate sodium capsule 100 mg    enoxaparin injection 40 mg    fluticasone 50 mcg/actuation nasal spray 1 spray    furosemide tablet 40 mg    guaifenesin 12 hr tablet 600 mg    influenza vaccine 180 mcg/0.5 mL (for patients > 65) injection    levothyroxine tablet 25 mcg    magnesium oxide tablet 400 mg    midodrine tablet 5 mg    ondansetron injection 4 mg    pantoprazole EC tablet 40 mg    piperacillin-tazobactam 4.5 g in dextrose 5 % 100 mL IVPB (ready to mix system)    rosuvastatin tablet 10 mg    tamsulosin 24 hr capsule 0.4 mg    vancomycin (VANCOCIN) 1,250 mg in dextrose 5 % 250 mL IVPB       VITALS (Last 24H):  Temp:  [98.7 °F (37.1 °C)-100.6 °F (38.1 °C)]   Pulse:  [55-76]   Resp:  [18-36]   BP: ()/(33-63)   SpO2:  [85 %-99 %]     INTAKE & OUTPUT (Last 24H):    Intake/Output Summary (Last 24 hours) at 03/18/17 0907  Last data filed at 03/18/17 0227   Gross per 24 hour   Intake              200 ml   Output              525 ml   Net             -325 ml       REVIEW OF SYSTEMS:        Constitutional: Negative for chills, weight loss, malaise/fatigue and diaphoresis. +fevers, fatigue  HENT: Negative for hearing loss, ear pain, nosebleeds, congestion, sore throat, neck pain, tinnitus and ear discharge.    Eyes: Negative for blurred vision, double vision, photophobia, pain, discharge and redness.   Respiratory: Negative for hemoptysis, sputum production, shortness of breath,  wheezing and stridor.  +cough  Cardiovascular: Negative for chest pain, palpitations, orthopnea, claudication, leg swelling and PND.   Gastrointestinal: Negative for heartburn, nausea, vomiting, abdominal pain, diarrhea, constipation, blood in stool and melena.   Genitourinary: Negative for dysuria, urgency, frequency, hematuria and flank pain.   Musculoskeletal: Negative for myalgias, back pain, joint pain and falls.   Skin: Negative for itching and rash.   Neurological: Negative for dizziness, tingling, tremors, sensory change, speech change, focal weakness, seizures, loss of consciousness, weakness and headaches.   Endo/Heme/Allergies: Negative for environmental allergies and polydipsia. Does not bruise/bleed easily.   Psychiatric/Behavioral: Negative for depression, suicidal ideas, hallucinations, memory loss and substance abuse. The patient is not nervous/anxious and does not have insomnia.    All 14 systems reviewed and negative except as noted above.     PHYSICAL EXAM:    Constitutional: Appears well-developed, well-nourished and in no acute distress.  Patient is oriented to person, place, and time. He is sitting up eating his breakfast  HEENT: Normocephalic and atraumatic. No maxillary sinus tenderness. External auditory canals clear and TMs intact without lesions. Nasal and oral mucosal membranes moist. Normal dentition and gums.   Neck: Neck supple no mass.   Cardiovascular: Normal rate and regular rhythm.  S1 S2 appreciated by ascultation. Rare scattered wheeze in RUL.  Pulmonary/Chest: Effort normal and clear to auscultation bilaterally. No respiratory distress.   Abdomen: Soft. Non-tender and non-distended. Bowel sounds are normal.   Neurological: Moves all extremities.  Skin: Warm and dry. No lesions.  Extremities: No clubbing cyanosis. 1+ edema in BLE.     Laboratory Data:    Lab Results   Component Value Date    WBC 5.41 03/18/2017    HGB 10.5 (L) 03/18/2017    HCT 34.1 (L) 03/18/2017    MCV 97  03/18/2017     03/18/2017         Recent Labs  Lab 03/18/17  0610      *   K 4.1   CL 96   CO2 29   BUN 16   CREATININE 0.9   CALCIUM 8.8       Lab Results   Component Value Date    ALT 17 03/18/2017    AST 14 03/18/2017    ALKPHOS 57 03/18/2017    BILITOT 0.5 03/18/2017       Lab Results   Component Value Date    IRON 80 10/26/2016    TIBC 342 10/26/2016    FERRITIN 127 10/26/2016     Lab Results   Component Value Date    [id number] 780 10/26/2016     Lab Results   Component Value Date    FOLATE 7.3 10/26/2016     Lab Results   Component Value Date    TSH 2.362 11/24/2016       Lab Results   Component Value Date    APTT 34.4 (H) 03/17/2017     Lab Results   Component Value Date    INR 1.8 (H) 03/17/2017    INR 1.1 11/24/2016    INR 1.1 11/14/2010         Radiology:    CXR 3/17/17: Atelectasis and/or infiltrate in the left lung base at the costophrenic angle.  I personally reviewed imaging and concur with report.    ASSESSMENT/PLAN:   83 y/o male with Multiple myeloma admitted with acute on chronic resp failure, fevers, cough.    1. Multiple myeloma: On RevDex with good response thus far. He will f/u with Dr. Isabel after hospital discharge.  -- Can hold Revlimid during current hospital admission until infection cleared.    2. COPD, Hypoxic respiratory failure, acute on chronic with hypoxia and fevers, pneumonia: Due to PNA. On Vanc, Zosyn with ongoing, but lower fevers. O2 sats improved and able to wean down to 3L O2.   -- f/u blood cultures  -- Continue broad spectrum antibiotics of Zosyn, Vanc in this patient who is immunocompromised and has multiple comorbidities who p/w unstable VS.     3. Macrocytic anemia: Has h/o VitB12 deficiency but receives B12 injections monthly.    Thank you for allowing us to participate in the care of this patent.     Dixie Vaughn M.D.  Hematology Oncology

## 2017-03-18 NOTE — PROGRESS NOTES
[facility] Hospital Medicine  Progress Note    Patient Name: Lorne Roe  MRN: [id number]  Patient Class: IP- Inpatient   Admission Date: 3/17/2017  Length of Stay: 1 days  Attending Physician: Missy Moody MD  Primary Care Provider: Randi Bush MD        Subjective:     Principal Problem:Acute on chronic respiratory failure    HPI:  Lorne Roe is a 83 yo male with Multiple Myeloma (on Revlimid), chronic respiratory failure (3 L NC), COPD, HTN and CAD who was advised to present by Dr. aVughn. He c/o fever, decreased appetite, weakness, SOB, productive cough with whitish sputum and having to increase 3L oxygen to 4 to 5 L. Has been exhibiting symptoms x 8 days.  In the ED, temp 100.6. CBC notes anemia; Chem notes hyponatremia & metabolic alkalosis. Influenza pending.  CXR -  Atelectasis and/or infiltrate in the left lung base at the costophrenic angle.          Hospital Course:  Admitted with possible pneumonia and treated with IV Vanco/ Zosyn and feels much better today with improved appetite. No FCCS, no NVD or urinary symptoms.    Interval History: Admitted with possible pneumonia and treated with IV Vanco/ Zosyn and feels much better today with improved appetite. No FCCS, no NVD or urinary symptoms.    Review of Systems   Constitutional: Positive for appetite change and fatigue.   HENT: Negative for ear discharge, ear pain, rhinorrhea, sore throat and trouble swallowing.    Eyes: Negative for pain, redness and visual disturbance.   Respiratory: Positive for cough and shortness of breath. Negative for wheezing.    Cardiovascular: Negative for chest pain, palpitations and leg swelling.   Gastrointestinal: Negative for abdominal pain, constipation, diarrhea and vomiting.   Genitourinary: Negative for dysuria, flank pain, frequency and hematuria.   Musculoskeletal: Positive for back pain. Negative for arthralgias, gait problem and myalgias.   Skin: Negative for pallor, rash and wound.    Neurological: Positive for weakness. Negative for dizziness, light-headedness and headaches.   Psychiatric/Behavioral: Negative for confusion.     Objective:     Vital Signs (Most Recent):  Temp: 99.4 °F (37.4 °C) (03/18/17 1137)  Pulse: 82 (03/18/17 1514)  Resp: 20 (03/18/17 1514)  BP: (!) 119/59 (03/18/17 1137)  SpO2: (!) 90 % (03/18/17 1514) Vital Signs (24h Range):  Temp:  [98.7 °F (37.1 °C)-99.4 °F (37.4 °C)] 99.4 °F (37.4 °C)  Pulse:  [55-82] 82  Resp:  [18-24] 20  SpO2:  [90 %-99 %] 90 %  BP: (114-132)/(33-59) 119/59     Weight: 92.4 kg (203 lb 9.6 oz)  Body mass index is 31.89 kg/(m^2).    Intake/Output Summary (Last 24 hours) at 03/18/17 1600  Last data filed at 03/18/17 1412   Gross per 24 hour   Intake             1010 ml   Output              525 ml   Net              485 ml      Physical Exam   Constitutional: He is oriented to person, place, and time. He appears well-developed and well-nourished. No distress.   HENT:   Head: Normocephalic and atraumatic.   Nose: Nose normal.   Mouth/Throat: Oropharynx is clear and moist.   Eyes: Conjunctivae and EOM are normal. Pupils are equal, round, and reactive to light. No scleral icterus.   Neck: Normal range of motion. Neck supple.   Cardiovascular: Normal rate and regular rhythm.  Exam reveals no gallop and no friction rub.    Murmur heard.  Pulmonary/Chest: Effort normal. He has rhonchi in the right lower field.           Abdominal: Soft. Bowel sounds are normal.   obese   Musculoskeletal: Normal range of motion. He exhibits no edema or tenderness.   Lymphadenopathy:     He has no cervical adenopathy.   Neurological: He is alert and oriented to person, place, and time. He exhibits normal muscle tone.   Skin: Skin is warm and dry.   Psychiatric: He has a normal mood and affect. His behavior is normal.   Nursing note and vitals reviewed.      Significant Labs:   Blood Culture:   Recent Labs  Lab 03/17/17  1240 03/17/17  1255   LABBLOO No Growth to date No  Growth to date     BMP:   Recent Labs  Lab 03/18/17  0610      *   K 4.1   CL 96   CO2 29   BUN 16   CREATININE 0.9   CALCIUM 8.8     CBC:   Recent Labs  Lab 03/17/17  1250 03/18/17  0610   WBC 6.66 5.41   HGB 11.5* 10.5*   HCT 36.4* 34.1*    171     Lactic Acid:   Recent Labs  Lab 03/17/17  1250   LACTATE 0.9       Urine Studies:   Recent Labs  Lab 03/17/17  2112   COLORU Yellow   APPEARANCEUA Clear   PHUR 5.0   SPECGRAV 1.020   PROTEINUA 1+*   GLUCUA Negative   KETONESU Negative   BILIRUBINUA Negative   OCCULTUA Negative   NITRITE Negative   UROBILINOGEN Negative   LEUKOCYTESUR Negative   RBCUA 0   WBCUA 0   BACTERIA Rare   HYALINECASTS 0     All pertinent labs within the past 24 hours have been reviewed.  Imaging Results         X-Ray Chest AP Portable (SOB) (Final result) Result time:  03/17/17 13:05:31    Final result by Michael Quijano MD (03/17/17 13:05:31)    Impression:     Atelectasis and/or infiltrate in the left lung base at the costophrenic angle.            Electronically signed by: MICHAEL QUIJANO MD  Date:     03/17/17  Time:    13:05     Narrative:    Exam: Portable chest radiograph    Clinical History: Shortness of breath.     Comparison: Chest x-ray, 01/25/2017.    Findings:.     The right lung is clear.  In the left costophrenic angle there is some atelectasis and or infiltrate. The cardiac silhouette is within normal limits. Are wires are noted.  Anterior cervical spinal fusion hardware is noted.            Significant Imaging: I have reviewed all pertinent imaging results/findings within the past 24 hours.    Assessment/Plan:      * Acute on chronic respiratory failure  Sec to Pneumonia  Supplemental oxygen to maintain sats > 88 %  Wean as appropriate      Pneumonia  supplemental oxygen  IV Vanco and Zosyn  Mucinex  Bronchodilators  Sputum culture        COPD (chronic obstructive pulmonary disease)  Supplemental oxygen to maintain oxygen level >88 %  DuoNebs    Chronic coronary  artery disease  Continue ASA, Plavix, Atenolol and Statin  Stable      Multiple myeloma  Hold Revlimid for now  Outpatient follow up with Oncology  Dr. Vaughn following      Immunocompromised patient  Immunocompromised due to multiple myeloma and oral chemotherapy and steroids  IV antibiotics  Blood cultures pending      Hypertension  stable      VTE Risk Mitigation         Ordered     enoxaparin injection 40 mg  Daily     Route:  Subcutaneous        03/17/17 1559     Medium Risk of VTE  Once      03/17/17 1558     Place sequential compression device  Until discontinued      03/17/17 1558     Place ITALO hose  Until discontinued      03/17/17 1558          Missy Moody MD  Department of Hospital Medicine   [facility]

## 2017-03-18 NOTE — PLAN OF CARE
Problem: Patient Care Overview  Goal: Plan of Care Review  Outcome: Ongoing (interventions implemented as appropriate)  Patient AAOx4.  Vitals stable.  No complaints of pain.  IV abx administered as ordered. No falls on shift.  [place] encouraged

## 2017-03-18 NOTE — PLAN OF CARE
Consult received for counseling.  CM reviewed notes from clinic.  Didi Blake LCSW discussed outpatient option.  CM met with patient at the bedside to follow up with access to mental health care.  Patient is aware of options but stated that he has many medical appointments and has not been able [facility] or [facility]  He states that he will schedule an appointment as soon as he is able.  He states that he currently uses [facility] and has a SN and PT that come to his home.  He is agreeable to resuming services when discharged.  He [facility] medications [facility] and a private pharmacy.  [facility] (Dr Minaya).  He does not feel as though he will require any other post hospital needs aside from resuming home health.  Choice for obtained for [facility] and placed in chart.  CM provided contact information for any needs/questions.     03/18/17 0846   Discharge Assessment   Assessment Type Discharge Planning Assessment   Confirmed/corrected address and phone number on facesheet? Yes   Assessment information obtained from? Patient;Medical Record   Expected Length of Stay (days) (TBD)   Communicated expected length of stay with patient/caregiver yes   Prior to hospitilization cognitive status: Alert/Oriented   Prior to hospitalization functional status: Assistive Equipment;Needs Assistance   Current cognitive status: Alert/Oriented   Current Functional Status: Assistive Equipment;Needs Assistance   Arrived From admitted as an inpatient;home health   Lives With spouse   Able to Return to Prior Arrangements yes   Is patient able to care for self after discharge? Yes  (with assistance)   How many people do you have in your home that can help with your care after discharge? 1   Who are your caregiver(s) and their phone number(s)? Елена Roe, spouse [phone]   Patient's perception of discharge  disposition admitted as an inpatient;home health   Readmission Within The Last 30 Days no previous admission in last 30 days   Patient currently being followed by outpatient case management? No   Patient currently receives home health services? Yes   Patient previously received home health services and would like to resume services if necessary? Yes   If yes, name of home health provider: [facility]   Does the patient currently use HME? Yes   Name and contact number for H[facility] provider: [facility]    Patient currently receives private duty nursing? No   Patient currently receives any other outside agency services? No   Equipment Currently Used at Home bedside commode;other (see comments);shower chair;oxygen;CPAP;hospital bed;rollator  (nebulizer )   Do you have any problems affording any of your prescribed medications? No   Is the patient taking medications as prescribed? yes   Do you have any financial concerns preventing you from receiving the healthcare you need? No   Does the patient have transportation to healthcare appointments? Yes   Transportation Available family or friend will provide   On Dialysis? No   Does the patient receive services at the [facility]? No   Are there any open cases? No   Discharge Plan A Home with family;Home Health   Discharge Plan B Home with family;Home Health   Patient/Family In Agreement With Plan yes

## 2017-03-18 NOTE — NURSING
Patient arrived to the roomvia stretcher with ER nurse Mckayla.  Oriented to the room, call light, fall precautions, rounding sheet and menu  Patient stated understanding  Heart monitor applied  Vital signs taken and stable

## 2017-03-18 NOTE — ED NOTES
Patient laying up in bed, no acute distress noted, awake, alert, and oriented x 3, calm, respirations even and unlabored, and skin is warm and dry. Patient verbalized understanding of status and plan of care. Patient denies needs at this time. Side rails up x 2, call light in reach, bed low and locked. Will continue to monitor.

## 2017-03-18 NOTE — PLAN OF CARE
Problem: Patient Care Overview  Goal: Plan of Care Review  Outcome: Ongoing (interventions implemented as appropriate)  Nutrition Recommendations  Continue Cardiac Diet as tolerated, Boost with meals intake <50%      Goals: intake >=75%  Nutrition Goal Status: new  Communication of RD Recs: other (comment) (note/careplan)     Nutrition Discharge Plan  Home on Cardiac diet with intake >=75%

## 2017-03-18 NOTE — SUBJECTIVE & OBJECTIVE
Interval History: Admitted with possible pneumonia and treated with IV Vanco/ Zosyn and feels much better today with improved appetite. No FCCS, no NVD or urinary symptoms.    Review of Systems   Constitutional: Positive for appetite change and fatigue.   HENT: Negative for ear discharge, ear pain, rhinorrhea, sore throat and trouble swallowing.    Eyes: Negative for pain, redness and visual disturbance.   Respiratory: Positive for cough and shortness of breath. Negative for wheezing.    Cardiovascular: Negative for chest pain, palpitations and leg swelling.   Gastrointestinal: Negative for abdominal pain, constipation, diarrhea and vomiting.   Genitourinary: Negative for dysuria, flank pain, frequency and hematuria.   Musculoskeletal: Positive for back pain. Negative for arthralgias, gait problem and myalgias.   Skin: Negative for pallor, rash and wound.   Neurological: Positive for weakness. Negative for dizziness, light-headedness and headaches.   Psychiatric/Behavioral: Negative for confusion.     Objective:     Vital Signs (Most Recent):  Temp: 99.4 °F (37.4 °C) (03/18/17 1137)  Pulse: 82 (03/18/17 1514)  Resp: 20 (03/18/17 1514)  BP: (!) 119/59 (03/18/17 1137)  SpO2: (!) 90 % (03/18/17 1514) Vital Signs (24h Range):  Temp:  [98.7 °F (37.1 °C)-99.4 °F (37.4 °C)] 99.4 °F (37.4 °C)  Pulse:  [55-82] 82  Resp:  [18-24] 20  SpO2:  [90 %-99 %] 90 %  BP: (114-132)/(33-59) 119/59     Weight: 92.4 kg (203 lb 9.6 oz)  Body mass index is 31.89 kg/(m^2).    Intake/Output Summary (Last 24 hours) at 03/18/17 1600  Last data filed at 03/18/17 1412   Gross per 24 hour   Intake             1010 ml   Output              525 ml   Net              485 ml      Physical Exam   Constitutional: He is oriented to person, place, and time. He appears well-developed and well-nourished. No distress.   HENT:   Head: Normocephalic and atraumatic.   Nose: Nose normal.   Mouth/Throat: Oropharynx is clear and moist.   Eyes: Conjunctivae and EOM  are normal. Pupils are equal, round, and reactive to light. No scleral icterus.   Neck: Normal range of motion. Neck supple.   Cardiovascular: Normal rate and regular rhythm.  Exam reveals no gallop and no friction rub.    Murmur heard.  Pulmonary/Chest: Effort normal. He has rhonchi in the right lower field.           Abdominal: Soft. Bowel sounds are normal.   obese   Musculoskeletal: Normal range of motion. He exhibits no edema or tenderness.   Lymphadenopathy:     He has no cervical adenopathy.   Neurological: He is alert and oriented to person, place, and time. He exhibits normal muscle tone.   Skin: Skin is warm and dry.   Psychiatric: He has a normal mood and affect. His behavior is normal.   Nursing note and vitals reviewed.      Significant Labs:   Blood Culture:   Recent Labs  Lab 03/17/17  1240 03/17/17  1255   LABBLOO No Growth to date No Growth to date     BMP:   Recent Labs  Lab 03/18/17  0610      *   K 4.1   CL 96   CO2 29   BUN 16   CREATININE 0.9   CALCIUM 8.8     CBC:   Recent Labs  Lab 03/17/17  1250 03/18/17  0610   WBC 6.66 5.41   HGB 11.5* 10.5*   HCT 36.4* 34.1*    171     Lactic Acid:   Recent Labs  Lab 03/17/17  1250   LACTATE 0.9       Urine Studies:   Recent Labs  Lab 03/17/17  2112   COLORU Yellow   APPEARANCEUA Clear   PHUR 5.0   SPECGRAV 1.020   PROTEINUA 1+*   GLUCUA Negative   KETONESU Negative   BILIRUBINUA Negative   OCCULTUA Negative   NITRITE Negative   UROBILINOGEN Negative   LEUKOCYTESUR Negative   RBCUA 0   WBCUA 0   BACTERIA Rare   HYALINECASTS 0     All pertinent labs within the past 24 hours have been reviewed.  Imaging Results         X-Ray Chest AP Portable (SOB) (Final result) Result time:  03/17/17 13:05:31    Final result by Michael Quijano MD (03/17/17 13:05:31)    Impression:     Atelectasis and/or infiltrate in the left lung base at the costophrenic angle.            Electronically signed by: MICHAEL QUIJANO MD  Date:     03/17/17  Time:    13:05      Narrative:    Exam: Portable chest radiograph    Clinical History: Shortness of breath.     Comparison: Chest x-ray, 01/25/2017.    Findings:.     The right lung is clear.  In the left costophrenic angle there is some atelectasis and or infiltrate. The cardiac silhouette is within normal limits. Are wires are noted.  Anterior cervical spinal fusion hardware is noted.            Significant Imaging: I have reviewed all pertinent imaging results/findings within the past 24 hours.

## 2017-03-18 NOTE — PLAN OF CARE
Problem: Patient Care Overview  Goal: Plan of Care Review  Outcome: Ongoing (interventions implemented as appropriate)  Pt continue on antibiotics, PT/OT for generalized weakness, usage of Home health services post d/c, O2 per nasal cannula at 3 LPM

## 2017-03-18 NOTE — CONSULTS
[facility] -   Adult Nutrition  Consult Note    SUMMARY     Recommendations  Continue Cardiac Diet as tolerated, Boost with meals intake <50%     Goals: intake >=75%  Nutrition Goal Status: new  Communication of RD Recs: other (comment) (note/careplan)    Nutrition Discharge Plan  Home on Cardiac diet with intake >=75%    Reason for Assessment    Reason for Assessment: nurse/nurse practitioner consult  Diagnosis: pulmonary disease  Relevent Medical History: Paraproteinemia, HTN, CAD, Depression, COPD, b12 deficeincy anemia, Vit D deficiency, PVD, Osteopenia, Multi Myeloma   Interdisciplinary Rounds: did not attend     General Information Comments: Pt reports decreased appetite at this time, home oxygen, oral chemo    Nutrition Prescription Ordered    Current Diet Order: Cardiac      Evaluation of Received Nutrients/Fluid Intake  Comments: no appetite  Tolerance: tolerating     Nutrition Risk Screen     Nutrition Risk Screen: unintentional loss of 10 lbs or more in the past 2 mos    Nutrition/Diet History    Patient Reported Diet/Restrictions/Preferences: low salt, heart healthy  Factors Affecting Nutritional Intake: compromised airway, decreased appetite    Labs/Tests/Procedures/Meds       Pertinent Labs Reviewed: reviewed  Pertinent Labs Comments: Na 135L, Alb 2.6L, H/H 10.5/34.1L  Pertinent Medications Reviewed: reviewed       Physical Findings    Overall Physical Appearance: on oxygen therapy, obese  Tubes:  (N/A)     Skin:  (rhonchi)    Anthropometrics       Height (inches): 67.01 in  Weight Method: Bed Scale  Weight (kg): 92.4 kg  Ideal Body Weight (IBW), Male: 148.06 lb     % Ideal Body Weight, Male (lb): 137.59 lb     BMI (kg/m2): 31.9  BMI Grade: 30 - 34.9- obesity - grade I    Estimated/Assessed Needs    Weight Used For Calorie Calculations: 92.4 kg (203 lb 11.3 oz)   Height (cm): 170.2 cm     Energy Need Method: [place]-St Jeor (2000 (x1.3))     RMR ([place]-St. Jeor Equation): 1588.39         Weight Used For Protein Calculations: 92.4 kg (203 lb 11.3 oz)  Protein Requirements:  (1.0-1.2)  1.0 gm Protein (gm): 92.59 and 1.2 gm Protein (gm): 111.11  Fluid Need Method: RDA Method (1ml/kcal or per MD)    Nutrition Diagnosis    Inadequate energy intake R/T illness AEB decreased appetite  Status: New    Monitor and Evaluation    Food and Nutrient Intake: food and beverage intake  Food and Nutrient Adminstration: diet order  Knowledge/Beliefs/Attitudes: food and nutrition knowledge/skill, beliefs and attitudes  Physical Activity and Function: nutrition-related ADLs and IADLs  Anthropometric Measurements: weight, weight change  Biochemical Data, Medical Tests and Procedures: electrolyte and renal panel  Nutrition-Focused Physical Findings: overall appearance    Nutrition Risk    Level of Risk: moderate    Nutrition Follow-Up    RD Follow-up?: Yes (1x/wk)

## 2017-03-18 NOTE — ASSESSMENT & PLAN NOTE
Immunocompromised due to multiple myeloma and oral chemotherapy and steroids  IV antibiotics  Blood cultures pending

## 2017-03-19 LAB
ALBUMIN SERPL BCP-MCNC: 2.7 G/DL
ALP SERPL-CCNC: 58 U/L
ALT SERPL W/O P-5'-P-CCNC: 20 U/L
ANION GAP SERPL CALC-SCNC: 10 MMOL/L
AST SERPL-CCNC: 12 U/L
BASOPHILS # BLD AUTO: 0 K/UL
BASOPHILS NFR BLD: 0 %
BILIRUB SERPL-MCNC: 0.4 MG/DL
BUN SERPL-MCNC: 17 MG/DL
CALCIUM SERPL-MCNC: 9.3 MG/DL
CHLORIDE SERPL-SCNC: 93 MMOL/L
CO2 SERPL-SCNC: 30 MMOL/L
CREAT SERPL-MCNC: 1 MG/DL
DIFFERENTIAL METHOD: ABNORMAL
EOSINOPHIL # BLD AUTO: 0 K/UL
EOSINOPHIL NFR BLD: 0 %
ERYTHROCYTE [DISTWIDTH] IN BLOOD BY AUTOMATED COUNT: 17.3 %
EST. GFR  (AFRICAN AMERICAN): >60 ML/MIN/1.73 M^2
EST. GFR  (NON AFRICAN AMERICAN): >60 ML/MIN/1.73 M^2
GLUCOSE SERPL-MCNC: 245 MG/DL
HCT VFR BLD AUTO: 33.1 %
HGB BLD-MCNC: 10.5 G/DL
LYMPHOCYTES # BLD AUTO: 0.3 K/UL
LYMPHOCYTES NFR BLD: 8.4 %
MCH RBC QN AUTO: 30.2 PG
MCHC RBC AUTO-ENTMCNC: 31.7 %
MCV RBC AUTO: 95 FL
MONOCYTES # BLD AUTO: 0.4 K/UL
MONOCYTES NFR BLD: 13.2 %
NEUTROPHILS # BLD AUTO: 2.4 K/UL
NEUTROPHILS NFR BLD: 78.4 %
PLATELET # BLD AUTO: 203 K/UL
PMV BLD AUTO: 9.1 FL
POTASSIUM SERPL-SCNC: 4.2 MMOL/L
PROT SERPL-MCNC: 7.2 G/DL
RBC # BLD AUTO: 3.48 M/UL
SODIUM SERPL-SCNC: 133 MMOL/L
VANCOMYCIN TROUGH SERPL-MCNC: 10.2 UG/ML
WBC # BLD AUTO: 3.1 K/UL

## 2017-03-19 PROCEDURE — 97535 SELF CARE MNGMENT TRAINING: CPT

## 2017-03-19 PROCEDURE — G8988 SELF CARE GOAL STATUS: HCPCS | Mod: CL

## 2017-03-19 PROCEDURE — 80053 COMPREHEN METABOLIC PANEL: CPT

## 2017-03-19 PROCEDURE — 36415 COLL VENOUS BLD VENIPUNCTURE: CPT

## 2017-03-19 PROCEDURE — 80202 ASSAY OF VANCOMYCIN: CPT

## 2017-03-19 PROCEDURE — 25000003 PHARM REV CODE 250: Performed by: EMERGENCY MEDICINE

## 2017-03-19 PROCEDURE — 25000003 PHARM REV CODE 250: Performed by: NURSE PRACTITIONER

## 2017-03-19 PROCEDURE — G8989 SELF CARE D/C STATUS: HCPCS | Mod: CL

## 2017-03-19 PROCEDURE — 97165 OT EVAL LOW COMPLEX 30 MIN: CPT

## 2017-03-19 PROCEDURE — G8979 MOBILITY GOAL STATUS: HCPCS | Mod: CI

## 2017-03-19 PROCEDURE — 97162 PT EVAL MOD COMPLEX 30 MIN: CPT

## 2017-03-19 PROCEDURE — G8987 SELF CARE CURRENT STATUS: HCPCS | Mod: CL

## 2017-03-19 PROCEDURE — 97116 GAIT TRAINING THERAPY: CPT

## 2017-03-19 PROCEDURE — 99232 SBSQ HOSP IP/OBS MODERATE 35: CPT | Mod: ,,, | Performed by: INTERNAL MEDICINE

## 2017-03-19 PROCEDURE — 94640 AIRWAY INHALATION TREATMENT: CPT

## 2017-03-19 PROCEDURE — 85025 COMPLETE CBC W/AUTO DIFF WBC: CPT

## 2017-03-19 PROCEDURE — 63600175 PHARM REV CODE 636 W HCPCS: Performed by: EMERGENCY MEDICINE

## 2017-03-19 PROCEDURE — 21400001 HC TELEMETRY ROOM

## 2017-03-19 PROCEDURE — G8978 MOBILITY CURRENT STATUS: HCPCS | Mod: CI

## 2017-03-19 PROCEDURE — 25000003 PHARM REV CODE 250: Performed by: INTERNAL MEDICINE

## 2017-03-19 PROCEDURE — 27000221 HC OXYGEN, UP TO 24 HOURS

## 2017-03-19 PROCEDURE — G8980 MOBILITY D/C STATUS: HCPCS | Mod: CI

## 2017-03-19 PROCEDURE — 63600175 PHARM REV CODE 636 W HCPCS: Performed by: NURSE PRACTITIONER

## 2017-03-19 PROCEDURE — 25000242 PHARM REV CODE 250 ALT 637 W/ HCPCS: Performed by: NURSE PRACTITIONER

## 2017-03-19 RX ORDER — CYANOCOBALAMIN 1000 UG/ML
1000 INJECTION, SOLUTION INTRAMUSCULAR; SUBCUTANEOUS ONCE
Status: COMPLETED | OUTPATIENT
Start: 2017-03-19 | End: 2017-03-19

## 2017-03-19 RX ORDER — MOXIFLOXACIN HYDROCHLORIDE 400 MG/1
400 TABLET ORAL DAILY
Status: DISCONTINUED | OUTPATIENT
Start: 2017-03-20 | End: 2017-03-20 | Stop reason: HOSPADM

## 2017-03-19 RX ADMIN — PANTOPRAZOLE SODIUM 600 MG: 40 TABLET, DELAYED RELEASE ORAL at 08:03

## 2017-03-19 RX ADMIN — CYANOCOBALAMIN 1000 MCG: 1000 INJECTION, SOLUTION INTRAMUSCULAR; SUBCUTANEOUS at 05:03

## 2017-03-19 RX ADMIN — MAGNESIUM OXIDE TAB 400 MG (241.3 MG ELEMENTAL MG) 400 MG: 400 (241.3 MG) TAB at 08:03

## 2017-03-19 RX ADMIN — BUDESONIDE 0.5 MG: 0.5 SUSPENSION RESPIRATORY (INHALATION) at 07:03

## 2017-03-19 RX ADMIN — IPRATROPIUM BROMIDE AND ALBUTEROL SULFATE 3 ML: .5; 3 SOLUTION RESPIRATORY (INHALATION) at 07:03

## 2017-03-19 RX ADMIN — ACYCLOVIR 400 MG: 400 TABLET ORAL at 08:03

## 2017-03-19 RX ADMIN — FLUTICASONE PROPIONATE 1 SPRAY: 50 SPRAY, METERED NASAL at 08:03

## 2017-03-19 RX ADMIN — ARFORMOTEROL TARTRATE 15 MCG: 15 SOLUTION RESPIRATORY (INHALATION) at 07:03

## 2017-03-19 RX ADMIN — PIPERACILLIN SODIUM AND TAZOBACTAM SODIUM 4.5 G: 4; .5 INJECTION, POWDER, LYOPHILIZED, FOR SOLUTION INTRAVENOUS at 01:03

## 2017-03-19 RX ADMIN — IPRATROPIUM BROMIDE AND ALBUTEROL SULFATE 3 ML: .5; 3 SOLUTION RESPIRATORY (INHALATION) at 12:03

## 2017-03-19 RX ADMIN — DOCUSATE SODIUM 100 MG: 100 CAPSULE, LIQUID FILLED ORAL at 08:03

## 2017-03-19 RX ADMIN — CLOPIDOGREL BISULFATE 75 MG: 75 TABLET ORAL at 08:03

## 2017-03-19 RX ADMIN — MIDODRINE HYDROCHLORIDE 5 MG: 5 TABLET ORAL at 05:03

## 2017-03-19 RX ADMIN — ASPIRIN 81 MG: 81 TABLET, COATED ORAL at 08:03

## 2017-03-19 RX ADMIN — BUPROPION HYDROCHLORIDE 75 MG: 75 TABLET, FILM COATED ORAL at 08:03

## 2017-03-19 RX ADMIN — VANCOMYCIN HYDROCHLORIDE 1250 MG: 1 INJECTION, POWDER, LYOPHILIZED, FOR SOLUTION INTRAVENOUS at 02:03

## 2017-03-19 RX ADMIN — TAMSULOSIN HYDROCHLORIDE 0.4 MG: 0.4 CAPSULE ORAL at 08:03

## 2017-03-19 RX ADMIN — IPRATROPIUM BROMIDE AND ALBUTEROL SULFATE 3 ML: .5; 3 SOLUTION RESPIRATORY (INHALATION) at 11:03

## 2017-03-19 RX ADMIN — FUROSEMIDE 40 MG: 40 TABLET ORAL at 08:03

## 2017-03-19 RX ADMIN — OXYCODONE HYDROCHLORIDE AND ACETAMINOPHEN 1 TABLET: 10; 325 TABLET ORAL at 05:03

## 2017-03-19 RX ADMIN — ROSUVASTATIN CALCIUM 10 MG: 10 TABLET, FILM COATED ORAL at 08:03

## 2017-03-19 RX ADMIN — CLONAZEPAM 0.5 MG: 0.5 TABLET ORAL at 08:03

## 2017-03-19 RX ADMIN — LEVOTHYROXINE SODIUM 25 MCG: 25 TABLET ORAL at 08:03

## 2017-03-19 RX ADMIN — IPRATROPIUM BROMIDE AND ALBUTEROL SULFATE 3 ML: .5; 3 SOLUTION RESPIRATORY (INHALATION) at 03:03

## 2017-03-19 RX ADMIN — PIPERACILLIN SODIUM AND TAZOBACTAM SODIUM 4.5 G: 4; .5 INJECTION, POWDER, LYOPHILIZED, FOR SOLUTION INTRAVENOUS at 05:03

## 2017-03-19 RX ADMIN — ATENOLOL 50 MG: 50 TABLET ORAL at 08:03

## 2017-03-19 RX ADMIN — PANTOPRAZOLE SODIUM 40 MG: 40 TABLET, DELAYED RELEASE ORAL at 08:03

## 2017-03-19 RX ADMIN — OXYCODONE HYDROCHLORIDE AND ACETAMINOPHEN 1 TABLET: 10; 325 TABLET ORAL at 11:03

## 2017-03-19 RX ADMIN — MIDODRINE HYDROCHLORIDE 5 MG: 5 TABLET ORAL at 08:03

## 2017-03-19 RX ADMIN — ENOXAPARIN SODIUM 40 MG: 100 INJECTION SUBCUTANEOUS at 05:03

## 2017-03-19 NOTE — PROGRESS NOTES
[facility] Hospital Medicine  Progress Note    Patient Name: Lorne Roe  MRN: [id number]  Patient Class: IP- Inpatient   Admission Date: 3/17/2017  Length of Stay: 2 days  Attending Physician: Missy Moody MD  Primary Care Provider: Randi Bush MD        Subjective:     Principal Problem:Acute on chronic respiratory failure    HPI:  Lorne Roe is a 81 yo male with Multiple Myeloma (on Revlimid), chronic respiratory failure (3 L NC), COPD, HTN and CAD who was advised to present by Dr. Vaughn. He c/o fever, decreased appetite, weakness, SOB, productive cough with whitish sputum and having to increase 3L oxygen to 4 to 5 L. Has been exhibiting symptoms x 8 days.  In the ED, temp 100.6. CBC notes anemia; Chem notes hyponatremia & metabolic alkalosis. Influenza pending.  CXR -  Atelectasis and/or infiltrate in the left lung base at the costophrenic angle.          Hospital Course:  Admitted with possible pneumonia and treated with IV Vanco/ Zosyn and feels much better today with improved appetite. No FCCS, no NVD or urinary symptoms.    Interval History: looks much better, eating drinking well, walked with PT, sitting in chair, wife is very pleased with his progress. No FCCS. All Cx NGTD.    Review of Systems   Constitutional: Negative for appetite change and fatigue.   HENT: Negative for ear discharge, ear pain, rhinorrhea, sore throat and trouble swallowing.    Eyes: Negative for pain, redness and visual disturbance.   Respiratory: Negative for cough, shortness of breath and wheezing.    Cardiovascular: Negative for chest pain, palpitations and leg swelling.   Gastrointestinal: Negative for abdominal pain, constipation, diarrhea and vomiting.   Genitourinary: Negative for dysuria, flank pain, frequency and hematuria.   Musculoskeletal: Positive for back pain. Negative for arthralgias, gait problem and myalgias.   Skin: Negative for pallor, rash and wound.   Neurological: Positive for weakness.  Negative for dizziness, light-headedness and headaches.   Psychiatric/Behavioral: Negative for confusion.     Objective:     Vital Signs (Most Recent):  Temp: 98.4 °F (36.9 °C) (03/19/17 1212)  Pulse: 73 (03/19/17 1552)  Resp: 18 (03/19/17 1552)  BP: (!) 107/56 (03/19/17 1212)  SpO2: 95 % (03/19/17 1552) Vital Signs (24h Range):  Temp:  [97.7 °F (36.5 °C)-98.6 °F (37 °C)] 98.4 °F (36.9 °C)  Pulse:  [68-92] 73  Resp:  [18-20] 18  SpO2:  [92 %-96 %] 95 %  BP: (103-146)/(53-62) 107/56     Weight: 92.4 kg (203 lb 9.6 oz)  Body mass index is 31.89 kg/(m^2).    Intake/Output Summary (Last 24 hours) at 03/19/17 1557  Last data filed at 03/19/17 0600   Gross per 24 hour   Intake             1050 ml   Output              660 ml   Net              390 ml      Physical Exam   Constitutional: He is oriented to person, place, and time. He appears well-developed and well-nourished. No distress.   Looks much better,    HENT:   Head: Normocephalic and atraumatic.   Nose: Nose normal.   Mouth/Throat: Oropharynx is clear and moist.   Eyes: Conjunctivae and EOM are normal. Pupils are equal, round, and reactive to light. No scleral icterus.   Neck: Normal range of motion. Neck supple.   Cardiovascular: Normal rate and regular rhythm.  Exam reveals no gallop and no friction rub.    Murmur heard.  Pulmonary/Chest: Effort normal. He has rhonchi in the right lower field.           Abdominal: Soft. Bowel sounds are normal.   obese   Musculoskeletal: Normal range of motion. He exhibits no edema or tenderness.   Lymphadenopathy:     He has no cervical adenopathy.   Neurological: He is alert and oriented to person, place, and time. He exhibits normal muscle tone.   Skin: Skin is warm and dry.   Psychiatric: He has a normal mood and affect. His behavior is normal.   Nursing note and vitals reviewed.      Significant Labs:   Blood Culture:   BMP:   Recent Labs  Lab 03/19/17  0631   *   *   K 4.2   CL 93*   CO2 30*   BUN 17    CREATININE 1.0   CALCIUM 9.3     CBC:   Recent Labs  Lab 03/18/17  0610 03/19/17  0631   WBC 5.41 3.10*   HGB 10.5* 10.5*   HCT 34.1* 33.1*    203     Urine Culture:   All pertinent labs within the past 24 hours have been reviewed.    Significant Imaging: I have reviewed all pertinent imaging results/findings within the past 24 hours.    Assessment/Plan:      * Acute on chronic respiratory failure  Much improved, back to baseline    Sec to Pneumonia  Supplemental oxygen to maintain sats > 88 %  Wean as appropriate      Pneumonia  Check CXr, D/C IV Abx-- start oral Avelox, Ambulate with PT/ Assistance  D/C soon    supplemental oxygen  IV Vanco and Zosyn  Mucinex  Bronchodilators  Sputum culture        COPD (chronic obstructive pulmonary disease)  Supplemental oxygen to maintain oxygen level >88 %  DuoNebs    Chronic coronary artery disease  Continue ASA, Plavix, Atenolol and Statin  Stable      Multiple myeloma  Hold Revlimid for now  Outpatient follow up with Oncology  Dr. Vaughn following      VTE Risk Mitigation         Ordered     enoxaparin injection 40 mg  Daily     Route:  Subcutaneous        03/17/17 1559     Medium Risk of VTE  Once      03/17/17 1558     Place sequential compression device  Until discontinued      03/17/17 1558     Place ITALO hose  Until discontinued      03/17/17 1558          Missy Moody MD  Department of Hospital Medicine   [facility]

## 2017-03-19 NOTE — CONSULTS
Hematology/Oncology Consult F/U    Reason for consultation: Management of hypoxic resp failure and fevers in setting of Multiple myeloma    HemOnc Diagnosis: Multiple myeloma    Prior Treatment: Velcade + Dex    Current Treatment: Revlimid + Dex    Chief Complaint:  Acute on chronic respiratory failure    HPI:    Lorne Roe is a 82 y.o. male with Multiple myeloma, COPD with chronic resp failure on home O2, VitB12 deficiency, CHF admitted with acute on chronic resp failure, cough and fevers. Pt had blood cultures drawn which are negative thus far. CXR shows possible PNA and pt was started on Vanc, Zosyn.     Today, the patient continues to improve and feel better. His appetite is improved and he was up walking with PT today. He has remained afebrile for the past 24 hours.      PAST MEDICAL HISTORY:  1. Multiple myeloma.  2. Obesity.  3. Chronic back pain with degenerative disk disease.  4. Coronary artery disease, status post bypass and coronary stents and  previous heart attacks.  5. High blood pressure.  6. Elevated cholesterol.  7. Peripheral vascular disease -- carotid stenosis.  8. History of leg edema.  9. Arthritis.  10.COPD/chronic respiratory failure  11-b12 deficiency     PREVIOUS SURGERIES: Coronary bypass surgery in 2003. Facial surgery  following an accident many years ago. Lower back surgery in 1998.     SOCIAL HISTORY: He is  with no children. He lives in [place].  He smoked until 1997 averaging two packs a day. He did this for 50 years.  He used to drink up to two cases of beer per week but stopped in 1995. He  used to be in the [employer] and retired from it and also worked as a riverboat  captain for several years.    Family HISTORY: Positive for melanoma.    Review of patient's allergies indicates:   Allergen Reactions    Adhesive     Benzalkonium chloride     Cephalexin      Other reaction(s): Diarrhea    Gramicidin d     Neomycin-bacitracin-polymyxin      Other reaction(s): Unknown        Medications:      Current Facility-Administered Medications   Medication    acetaminophen tablet 650 mg    acyclovir tablet 400 mg    albuterol-ipratropium 2.5mg-0.5mg/3mL nebulizer solution 3 mL    albuterol-ipratropium 2.5mg-0.5mg/3mL nebulizer solution 3 mL    arformoterol nebulizer solution 15 mcg    aspirin EC tablet 81 mg    atenolol tablet 50 mg    budesonide nebulizer solution 0.5 mg    buPROPion tablet 75 mg    clonazePAM tablet 0.5 mg    clopidogrel tablet 75 mg    docusate sodium capsule 100 mg    enoxaparin injection 40 mg    fluticasone 50 mcg/actuation nasal spray 1 spray    furosemide tablet 40 mg    guaifenesin 12 hr tablet 600 mg    influenza vaccine 180 mcg/0.5 mL (for patients > 65) injection    levothyroxine tablet 25 mcg    magnesium oxide tablet 400 mg    midodrine tablet 5 mg    ondansetron injection 4 mg    oxycodone-acetaminophen  mg per tablet 1 tablet    pantoprazole EC tablet 40 mg    piperacillin-tazobactam 4.5 g in dextrose 5 % 100 mL IVPB (ready to mix system)    rosuvastatin tablet 10 mg    tamsulosin 24 hr capsule 0.4 mg    vancomycin (VANCOCIN) 1,250 mg in dextrose 5 % 250 mL IVPB       VITALS (Last 24H):  Temp:  [97.7 °F (36.5 °C)-98.6 °F (37 °C)]   Pulse:  [68-92]   Resp:  [18-20]   BP: (103-146)/(53-62)   SpO2:  [90 %-96 %]     INTAKE & OUTPUT (Last 24H):    Intake/Output Summary (Last 24 hours) at 03/19/17 1301  Last data filed at 03/19/17 0600   Gross per 24 hour   Intake             1150 ml   Output              660 ml   Net              490 ml       REVIEW OF SYSTEMS:        Constitutional: Negative for chills, weight loss, malaise/fatigue and diaphoresis. +fevers, fatigue  HENT: Negative for hearing loss, ear pain, nosebleeds, congestion, sore throat, neck pain, tinnitus and ear discharge.    Eyes: Negative for blurred vision, double vision, photophobia, pain, discharge and redness.   Respiratory: Negative for hemoptysis, sputum  production, shortness of breath, wheezing and stridor.  +cough  Cardiovascular: Negative for chest pain, palpitations, orthopnea, claudication, leg swelling and PND.   Gastrointestinal: Negative for heartburn, nausea, vomiting, abdominal pain, diarrhea, constipation, blood in stool and melena.   Genitourinary: Negative for dysuria, urgency, frequency, hematuria and flank pain.   Musculoskeletal: Negative for myalgias, back pain, joint pain and falls.   Skin: Negative for itching and rash.   Neurological: Negative for dizziness, tingling, tremors, sensory change, speech change, focal weakness, seizures, loss of consciousness, weakness and headaches.   Endo/Heme/Allergies: Negative for environmental allergies and polydipsia. Does not bruise/bleed easily.   Psychiatric/Behavioral: Negative for depression, suicidal ideas, hallucinations, memory loss and substance abuse. The patient is not nervous/anxious and does not have insomnia.    All 14 systems reviewed and negative except as noted above.     PHYSICAL EXAM:    Constitutional: Appears well-developed, well-nourished and in no acute distress.  Patient is oriented to person, place, and time. He is sitting up eating his breakfast  HEENT: Normocephalic and atraumatic. No maxillary sinus tenderness. External auditory canals clear and TMs intact without lesions. Nasal and oral mucosal membranes moist. Normal dentition and gums.   Neck: Neck supple no mass.   Cardiovascular: Normal rate and regular rhythm.  S1 S2 appreciated by ascultation. Rare scattered wheeze in RUL.  Pulmonary/Chest: Effort normal and clear to auscultation bilaterally. No respiratory distress.   Abdomen: Soft. Non-tender and non-distended. Bowel sounds are normal.   Neurological: Moves all extremities.  Skin: Warm and dry. No lesions.  Extremities: No clubbing cyanosis. 1+ edema in BLE.     Laboratory Data:    Lab Results   Component Value Date    WBC 3.10 (L) 03/19/2017    HGB 10.5 (L) 03/19/2017     HCT 33.1 (L) 03/19/2017    MCV 95 03/19/2017     03/19/2017         Recent Labs  Lab 03/19/17  0631   *   *   K 4.2   CL 93*   CO2 30*   BUN 17   CREATININE 1.0   CALCIUM 9.3       Lab Results   Component Value Date    ALT 20 03/19/2017    AST 12 03/19/2017    ALKPHOS 58 03/19/2017    BILITOT 0.4 03/19/2017       Lab Results   Component Value Date    IRON 80 10/26/2016    TIBC 342 10/26/2016    FERRITIN 127 10/26/2016     Lab Results   Component Value Date    MLXIORKZ53 780 10/26/2016     Lab Results   Component Value Date    FOLATE 7.3 10/26/2016     Lab Results   Component Value Date    TSH 2.362 11/24/2016       Lab Results   Component Value Date    APTT 34.4 (H) 03/17/2017     Lab Results   Component Value Date    INR 1.8 (H) 03/17/2017    INR 1.1 11/24/2016    INR 1.1 11/14/2010         Radiology:    CXR 3/17/17: Atelectasis and/or infiltrate in the left lung base at the costophrenic angle.  I personally reviewed imaging and concur with report.    ASSESSMENT/PLAN:   81 y/o male with Multiple myeloma admitted with acute on chronic resp failure, fevers, cough.    1. Multiple myeloma: On RevDex with good response thus far. He will f/u with Dr. Isabel after hospital discharge.  -- Can hold Revlimid during current hospital admission until infection cleared.    2. COPD, Hypoxic respiratory failure, acute on chronic with hypoxia and fevers, pneumonia: Due to PNA. On Vanc, Zosyn with ongoing, but lower fevers. O2 sats improved and able to wean down to 3L O2. Blood cultures negative.   -- Continue broad spectrum antibiotics of Zosyn, Vanc in this patient who is immunocompromised and has multiple comorbidities who p/w unstable VS.  -- Pt can likely be transitioned to oral antibiotics (Levofloxacin) tomorrow for discharge     3. Macrocytic anemia: Has h/o VitB12 deficiency but receives B12 injections monthly.    Thank you for allowing us to participate in the care of this patent.     Dixie Vaughn,  M.D.  Hematology Oncology

## 2017-03-19 NOTE — ASSESSMENT & PLAN NOTE
Check CXr, D/C IV Abx-- start oral Avelox, Ambulate with PT/ Assistance  D/C soon    supplemental oxygen  IV Vanco and Zosyn  Mucinex  Bronchodilators  Sputum culture

## 2017-03-19 NOTE — PT/OT/SLP EVAL
Physical Therapy  Evaluation    Lorne Roe   MRN: [id number]   Admitting Diagnosis: Acute on chronic respiratory failure    PT Received On: 03/19/17  PT Start Time: 1030     PT Stop Time: 1055    PT Total Time (min): 25 min       Billable Minutes:  Evaluation 15 and Gait [id number]    Diagnosis: Acute on chronic respiratory failure    Past Medical History:   Diagnosis Date    B12 deficiency anemia     COPD (chronic obstructive pulmonary disease)     Coronary artery disease     s/p cabg, cardiac stents [facility]    Depression     Hypertension     Multiple myeloma     Osteopenia     Paraproteinemia     Dr alvarado    PVD (peripheral vascular disease)     Dr Argueta    Vitamin D deficiency disease       Past Surgical History:   Procedure Laterality Date    BACK SURGERY      CAROTID ENDARTERECTOMY Left 12/17/13    left    CORONARY ANGIOPLASTY WITH STENT PLACEMENT      CORONARY ARTERY BYPASS GRAFT      stent/eren multi-link duet-safe to 1.5T    NECK SURGERY       Referring physician: DR. BURGESS  Date referred to PT: 3/19/2017    General Precautions: Standard, respiratory  Orthopedic Precautions: N/A   Braces: N/A         Patient History:  Lives With: spouse  Living Arrangements: mobile home  Home Accessibility: stairs to enter home  Home Layout: Able to live on 1st floor  Number of Stairs to Enter Home: 5  Stair Railings at Home: outside, present at both sides  Transportation Available: family or friend will provide  Living Environment Comment: PT LIVES WITH WIFE AND FAMILY, PT HAS ACCESS TO 24 HOUR CARE WITH ASSISTANCE AS NEEDED, PT IS O2 DEPENDENT  Equipment Currently Used at Home: rollator, hospital bed, wheelchair, oxygen, CPAP, grab bar, bedside commode, shower chair  DME owned (not currently used): none    Previous Level of Function:  Ambulation Skills: needs device (AMB HOUSEHOLD DISTACES WITH ROLLATOR WALKER)  Transfer Skills: independent  ADL Skills: needs device and assist  (WIFE ASSISTS WITH ADL'S)    Subjective:  Communicated with NURSE IVON prior to session.  Pain Ratin/10     Objective:   Patient found with: oxygen, telemetry     Cognitive Exam:  Oriented to: Person, Place, Time and Situation    Follows Commands/attention: Follows one-step commands  Communication: clear/fluent  Safety awareness/insight to disability: intact    Physical Exam:  Postural examination/scapula alignment: No postural abnormalities identified    Skin integrity: Visible skin intact  Edema: None noted     Sensation:   Intact    Upper Extremity Range of Motion:  REFER TO O.T. EVAL    Lower Extremity Range of Motion:  Right Lower Extremity: WFL  Left Lower Extremity: WFL    Lower Extremity Strength:  Right Lower Extremity: WFL  Left Lower Extremity: WFL     Functional Mobility:  Bed Mobility:  Rolling/Turning to Left: Modified independent  Rolling/Turning Right: Modified independent  Scooting/Bridging: Modified Independent  Supine to Sit: Modified Independent    Transfers:  Sit <> Stand Assistance: Supervision  Sit <> Stand Assistive Device: Rolling Walker  Bed <> Chair Technique: Stand Pivot  Bed <> Chair Assistance: Supervision  Bed <> Chair Assistive Device: Rolling Walker    Gait:   Gait Distance: PT AMB 60' X 2 TRIALS WITH RW AND SPV, NO LOB OR SOB WITH O2 IN TOW  Assistance 1: Supervision  Gait Assistive Device: Rolling walker  Gait Pattern: swing-through gait    Balance:   Static Sit: GOOD  Dynamic Sit: GOOD  Static Stand: GOOD  Dynamic stand: GOOD    Therapeutic Activities and Exercises:  PT COMBED HAIR WITH SET UP    Patient left up in chair with all lines intact, call button in reach and NURSE notified.    Assessment:   Lorne Roe is a 82 y.o. male with a medical diagnosis of Acute on chronic respiratory failure   PT IS NOT A CANDIDATE FOR INPATIENT SKILLED P.T. DUE TO HIGH LEVEL OF FUNCTION, PT WILL BENEFIT FROM [facility] FOR CONT. GAIT/TE    Rehab identified problem  list/impairments:     Rehab potential is    Activity tolerance:     Discharge recommendations: Discharge Facility/Level Of Care Needs: home health PT     Barriers to discharge: Barriers to Discharge: None    Equipment recommendations: Equipment Needed After Discharge: none     GOALS:   Physical Therapy Goals     Not on file          PLAN:      (D/C PT FROM P[facility] TO [facility])  Plan of Care reviewed with: patient     PT ENCOURAGED TO CALL FOR ASSISTANCE WITH ALL NEEDS, PT AGREEABLE.  PT ENCOURAGED TO INCREASE TIME OOB IN CHAIR, ALL MEALS IN CHAIR OOB, PT AGREEABLE    Functional Assessment Tool Used: FIM-GAIT  Score: 6  Functional Limitation: Mobility: Walking and moving around  Mobility: Walking and Moving Around Current Status (): CI  Mobility: Walking and Moving Around Goal Status (): CI  Mobility: Walking and Moving Around Discharge Status (): CI     Miriam Vicente, PT  03/19/2017

## 2017-03-19 NOTE — ASSESSMENT & PLAN NOTE
Much improved, back to baseline    Sec to Pneumonia  Supplemental oxygen to maintain sats > 88 %  Wean as appropriate

## 2017-03-19 NOTE — PROGRESS NOTES
Clinical Pharmacy Progress Note: Vancomycin      Estimated CrCl: 61.7 mL/min   WBC: 3.10   SCr: 1 (was 1.2 yesterday)      Cultures: Blood x 2- NGTD x 2 days.     Tmax/24h: 99.4      Level: Trough drawn 03/19/2017 @ 0133= 10.2 mcg/mL   Goal Trough: 15-20 mcg/mL (Indication: pneumonia)     Pharmacy will change patient's current dose to vancomycin 1250 mg IV every 12 hours.     Vancomycin trough due 3/20 at 01:45.     Thank you for allowing us to participate in this patient's care.      Constance Verdin, PharmD 3/19/2017 8:13 AM

## 2017-03-19 NOTE — PROGRESS NOTES
Patient complains of back pain due to history of multiple myeloma. Dr. Ambrosio notified. MD ordered patients home as needed dose of percocet 10mg every 6 hours. Patient currently in no distress. Will continue to monitor.

## 2017-03-20 ENCOUNTER — OUTPATIENT CASE MANAGEMENT (OUTPATIENT)
Dept: ADMINISTRATIVE | Facility: OTHER | Age: 82
End: 2017-03-20

## 2017-03-20 VITALS
BODY MASS INDEX: 31.96 KG/M2 | SYSTOLIC BLOOD PRESSURE: 104 MMHG | WEIGHT: 203.63 LBS | HEART RATE: 85 BPM | DIASTOLIC BLOOD PRESSURE: 52 MMHG | HEIGHT: 67 IN | RESPIRATION RATE: 18 BRPM | OXYGEN SATURATION: 94 % | TEMPERATURE: 98 F

## 2017-03-20 PROBLEM — J18.9 PNEUMONIA: Status: RESOLVED | Noted: 2017-03-17 | Resolved: 2017-03-20

## 2017-03-20 LAB
ALBUMIN SERPL BCP-MCNC: 2.7 G/DL
ALP SERPL-CCNC: 55 U/L
ALT SERPL W/O P-5'-P-CCNC: 18 U/L
ANION GAP SERPL CALC-SCNC: 9 MMOL/L
AST SERPL-CCNC: 8 U/L
BASOPHILS # BLD AUTO: 0 K/UL
BASOPHILS NFR BLD: 0 %
BILIRUB SERPL-MCNC: 0.2 MG/DL
BUN SERPL-MCNC: 20 MG/DL
CALCIUM SERPL-MCNC: 9.3 MG/DL
CHLORIDE SERPL-SCNC: 92 MMOL/L
CO2 SERPL-SCNC: 32 MMOL/L
CREAT SERPL-MCNC: 1 MG/DL
DIFFERENTIAL METHOD: ABNORMAL
EOSINOPHIL # BLD AUTO: 0 K/UL
EOSINOPHIL NFR BLD: 0 %
ERYTHROCYTE [DISTWIDTH] IN BLOOD BY AUTOMATED COUNT: 17.4 %
EST. GFR  (AFRICAN AMERICAN): >60 ML/MIN/1.73 M^2
EST. GFR  (NON AFRICAN AMERICAN): >60 ML/MIN/1.73 M^2
GLUCOSE SERPL-MCNC: 297 MG/DL
HCT VFR BLD AUTO: 32.1 %
HGB BLD-MCNC: 10.1 G/DL
LYMPHOCYTES # BLD AUTO: 0.2 K/UL
LYMPHOCYTES NFR BLD: 4.8 %
MCH RBC QN AUTO: 30.1 PG
MCHC RBC AUTO-ENTMCNC: 31.5 %
MCV RBC AUTO: 96 FL
MONOCYTES # BLD AUTO: 0.6 K/UL
MONOCYTES NFR BLD: 12.3 %
NEUTROPHILS # BLD AUTO: 4 K/UL
NEUTROPHILS NFR BLD: 82.9 %
PLATELET # BLD AUTO: 231 K/UL
PMV BLD AUTO: 9.5 FL
POTASSIUM SERPL-SCNC: 4.2 MMOL/L
PROT SERPL-MCNC: 6.8 G/DL
RBC # BLD AUTO: 3.36 M/UL
SODIUM SERPL-SCNC: 133 MMOL/L
WBC # BLD AUTO: 4.79 K/UL

## 2017-03-20 PROCEDURE — 25000003 PHARM REV CODE 250: Performed by: INTERNAL MEDICINE

## 2017-03-20 PROCEDURE — 85025 COMPLETE CBC W/AUTO DIFF WBC: CPT

## 2017-03-20 PROCEDURE — 25000003 PHARM REV CODE 250: Performed by: NURSE PRACTITIONER

## 2017-03-20 PROCEDURE — 25000003 PHARM REV CODE 250: Performed by: EMERGENCY MEDICINE

## 2017-03-20 PROCEDURE — 27000221 HC OXYGEN, UP TO 24 HOURS

## 2017-03-20 PROCEDURE — 94760 N-INVAS EAR/PLS OXIMETRY 1: CPT

## 2017-03-20 PROCEDURE — 99232 SBSQ HOSP IP/OBS MODERATE 35: CPT | Mod: ,,, | Performed by: INTERNAL MEDICINE

## 2017-03-20 PROCEDURE — 25000242 PHARM REV CODE 250 ALT 637 W/ HCPCS: Performed by: NURSE PRACTITIONER

## 2017-03-20 PROCEDURE — 63600175 PHARM REV CODE 636 W HCPCS: Performed by: NURSE PRACTITIONER

## 2017-03-20 PROCEDURE — 36415 COLL VENOUS BLD VENIPUNCTURE: CPT

## 2017-03-20 PROCEDURE — 80053 COMPREHEN METABOLIC PANEL: CPT

## 2017-03-20 PROCEDURE — 94640 AIRWAY INHALATION TREATMENT: CPT

## 2017-03-20 RX ORDER — LEVOFLOXACIN 500 MG/1
500 TABLET, FILM COATED ORAL DAILY
Qty: 10 TABLET | Refills: 0 | Status: SHIPPED | OUTPATIENT
Start: 2017-03-20 | End: 2017-05-01

## 2017-03-20 RX ADMIN — PANTOPRAZOLE SODIUM 40 MG: 40 TABLET, DELAYED RELEASE ORAL at 09:03

## 2017-03-20 RX ADMIN — LEVOTHYROXINE SODIUM 25 MCG: 25 TABLET ORAL at 09:03

## 2017-03-20 RX ADMIN — PANTOPRAZOLE SODIUM 600 MG: 40 TABLET, DELAYED RELEASE ORAL at 09:03

## 2017-03-20 RX ADMIN — CLONAZEPAM 0.5 MG: 0.5 TABLET ORAL at 09:03

## 2017-03-20 RX ADMIN — ARFORMOTEROL TARTRATE 15 MCG: 15 SOLUTION RESPIRATORY (INHALATION) at 07:03

## 2017-03-20 RX ADMIN — FUROSEMIDE 40 MG: 40 TABLET ORAL at 09:03

## 2017-03-20 RX ADMIN — MOXIFLOXACIN HYDROCHLORIDE 400 MG: 400 TABLET, FILM COATED ORAL at 09:03

## 2017-03-20 RX ADMIN — Medication 1 TABLET: at 09:03

## 2017-03-20 RX ADMIN — BUPROPION HYDROCHLORIDE 75 MG: 75 TABLET, FILM COATED ORAL at 09:03

## 2017-03-20 RX ADMIN — ASPIRIN 81 MG: 81 TABLET, COATED ORAL at 09:03

## 2017-03-20 RX ADMIN — MAGNESIUM OXIDE TAB 400 MG (241.3 MG ELEMENTAL MG) 400 MG: 400 (241.3 MG) TAB at 09:03

## 2017-03-20 RX ADMIN — OXYCODONE HYDROCHLORIDE AND ACETAMINOPHEN 1 TABLET: 10; 325 TABLET ORAL at 07:03

## 2017-03-20 RX ADMIN — ACYCLOVIR 400 MG: 400 TABLET ORAL at 09:03

## 2017-03-20 RX ADMIN — BUDESONIDE 0.5 MG: 0.5 SUSPENSION RESPIRATORY (INHALATION) at 07:03

## 2017-03-20 RX ADMIN — ATENOLOL 50 MG: 50 TABLET ORAL at 09:03

## 2017-03-20 RX ADMIN — DOCUSATE SODIUM 100 MG: 100 CAPSULE, LIQUID FILLED ORAL at 09:03

## 2017-03-20 RX ADMIN — FLUTICASONE PROPIONATE 1 SPRAY: 50 SPRAY, METERED NASAL at 09:03

## 2017-03-20 RX ADMIN — MIDODRINE HYDROCHLORIDE 5 MG: 5 TABLET ORAL at 07:03

## 2017-03-20 RX ADMIN — IPRATROPIUM BROMIDE AND ALBUTEROL SULFATE 3 ML: .5; 3 SOLUTION RESPIRATORY (INHALATION) at 07:03

## 2017-03-20 RX ADMIN — TAMSULOSIN HYDROCHLORIDE 0.4 MG: 0.4 CAPSULE ORAL at 09:03

## 2017-03-20 RX ADMIN — CLOPIDOGREL BISULFATE 75 MG: 75 TABLET ORAL at 09:03

## 2017-03-20 NOTE — PLAN OF CARE
Patient discharging home today, with  SN, PT, OT services being resumed (and  SW services for MH needs assessment) being arranged via [facility].  Contacted Brandi with [facility] and notified her of  services being ordered and of patient being discharged today.  [facility] verbalized understanding and indicates that they will be able to accept patient back for all  services ordered.  Faxed  order and other needed patient information to [facility]  via [facility] Fax to complete referral.  Outpatient follow-up appointments scheduled and patient referred to [facility] as well.      D/C PLAN:  Home with family, with HH SN, PT, OT, SW via [facility] and recommended outpatient follow-up        03/20/17 1339   Final Note   Assessment Type Final Discharge Note   Discharge Disposition Home-Health   What phone number can be called within the next 1-3 days to see how you are doing after discharge? [phone]   Hospital Follow Up  Appt(s) scheduled? Yes   Referral to Outpatient Case Management complete? n/a   Referral to / orders for Home Health Complete? Yes   Did you assess the readiness or willingness of the family or caregiver to support self management of care? Yes   Right Care Referral Info   Post Acute Recommendation Home-care   Referral Type  SN, PT, OT   Facility Name [place] [place]

## 2017-03-20 NOTE — PHYSICIAN QUERY
PT Name: Lorne Roe  MR #: [id number]     Physician Query Form - Diagnosis Clarification      CDS/: Yessenia Yee               Contact information:[phone] query withdrawn/ns    This form is a permanent document in the medical record.     Query Date: March 20, 2017    By submitting this query, we are merely seeking further clarification of documentation.  Please utilize your independent clinical judgment when addressing the question(s) below.     The medical record contains the following:      Findings Supporting Clinical Information Location in Medical Record   Possible Pneumonia   Admitted with possible pneumonia and treated with IV Vanco/ Zosyn and feels much better today with improved appetite.     IMPRESSION:     Atelectasis and/or infiltrate in the left lung base at the costophrenic angle.      He c/o fever, decreased  appetite, weakness, SOB, productive cough with whitish sputum and having to increase 3L oxygen to 4 to 5 L. Has been exhibiting symptoms x 8 days.  In the ED, temp 100.6.     Pn 3-18            C xray 3-17        H&P     Please clarify if the Poss Pneumonia diagnosis has been:    [  ] Ruled In  [  ] Ruled In, Now Resolved  [  ] Ruled Out  [  ] Other/Clarification of findings (please specify)_______________________________    Please document in your progress notes daily for the duration of treatment, until resolved, and include in your discharge summary.

## 2017-03-20 NOTE — PROGRESS NOTES
Hematology/Oncology Consult F/U     Reason for consultation: Management of hypoxic resp failure and fevers in setting of Multiple myeloma     HemOnc Diagnosis: Multiple myeloma     Prior Treatment: Velcade + Dex     Current Treatment: Revlimid + Dex     Chief Complaint: Acute on chronic respiratory failure     HPI:    Lorne Roe is a 82 y.o. male with Multiple myeloma, COPD with chronic resp failure on home O2, VitB12 deficiency, CHF admitted with acute on chronic resp failure, cough and fevers. Pt had blood cultures drawn which are negative thus far. CXR shows possible PNA and pt was started on Vanc, Zosyn.      The patient continues to improve and feel better. His appetite is improved and he was up walking with PT yesterday afternoon. He has remained afebrile for the past 48 hours. His IV antibiotics were switched to oral Moxifloxacin.        PAST MEDICAL HISTORY:  1. Multiple myeloma.  2. Obesity.  3. Chronic back pain with degenerative disk disease.  4. Coronary artery disease, status post bypass and coronary stents and  previous heart attacks.  5. High blood pressure.  6. Elevated cholesterol.  7. Peripheral vascular disease -- carotid stenosis.  8. History of leg edema.  9. Arthritis.  10.COPD/chronic respiratory failure  11-b12 deficiency      PREVIOUS SURGERIES: Coronary bypass surgery in 2003. Facial surgery  following an accident many years ago. Lower back surgery in 1998.      SOCIAL HISTORY: He is  with no children. He lives in [place].  He smoked until 1997 averaging two packs a day. He did this for 50 years.  He used to drink up to two cases of beer per week but stopped in 1995. He  used to be in the [employer] and retired from it and also worked as a riverboat  captain for several years.     Family HISTORY: Positive for melanoma.           Review of patient's allergies indicates:   Allergen Reactions    Adhesive      Benzalkonium chloride      Cephalexin         Other reaction(s): Diarrhea     Gramicidin d      Neomycin-bacitracin-polymyxin         Other reaction(s): Unknown         Medications:       Current Facility-Administered Medications   Medication    acetaminophen tablet 650 mg    acyclovir tablet 400 mg    albuterol-ipratropium 2.5mg-0.5mg/3mL nebulizer solution 3 mL    albuterol-ipratropium 2.5mg-0.5mg/3mL nebulizer solution 3 mL    arformoterol nebulizer solution 15 mcg    aspirin EC tablet 81 mg    atenolol tablet 50 mg    budesonide nebulizer solution 0.5 mg    buPROPion tablet 75 mg    clonazePAM tablet 0.5 mg    clopidogrel tablet 75 mg    docusate sodium capsule 100 mg    enoxaparin injection 40 mg    fluticasone 50 mcg/actuation nasal spray 1 spray    furosemide tablet 40 mg    guaifenesin 12 hr tablet 600 mg    influenza vaccine 180 mcg/0.5 mL (for patients > 65) injection    levothyroxine tablet 25 mcg    magnesium oxide tablet 400 mg    midodrine tablet 5 mg    ondansetron injection 4 mg    oxycodone-acetaminophen  mg per tablet 1 tablet    pantoprazole EC tablet 40 mg    piperacillin-tazobactam 4.5 g in dextrose 5 % 100 mL IVPB (ready to mix system)    rosuvastatin tablet 10 mg    tamsulosin 24 hr capsule 0.4 mg    vancomycin (VANCOCIN) 1,250 mg in dextrose 5 % 250 mL IVPB         VITALS (Last 24H):  Temp: [97.7 °F (36.5 °C)-98.6 °F (37 °C)]   Pulse: [68-92]   Resp: [18-20]   BP: (103-146)/(53-62)   SpO2: [90 %-96 %]      INTAKE & OUTPUT (Last 24H):     Intake/Output Summary (Last 24 hours) at 03/19/17 1301  Last data filed at 03/19/17 0600    Gross per 24 hour   Intake 1150 ml   Output 660 ml   Net 490 ml         REVIEW OF SYSTEMS:        Constitutional: Negative for chills, weight loss, malaise/fatigue and diaphoresis. +fevers, fatigue  HENT: Negative for hearing loss, ear pain, nosebleeds, congestion, sore throat, neck pain, tinnitus and ear discharge.   Eyes: Negative for blurred vision, double vision, photophobia, pain, discharge and redness.    Respiratory: Negative for hemoptysis, sputum production, shortness of breath, wheezing and stridor. +cough  Cardiovascular: Negative for chest pain, palpitations, orthopnea, claudication, leg swelling and PND.   Gastrointestinal: Negative for heartburn, nausea, vomiting, abdominal pain, diarrhea, constipation, blood in stool and melena.   Genitourinary: Negative for dysuria, urgency, frequency, hematuria and flank pain.   Musculoskeletal: Negative for myalgias, back pain, joint pain and falls.   Skin: Negative for itching and rash.   Neurological: Negative for dizziness, tingling, tremors, sensory change, speech change, focal weakness, seizures, loss of consciousness, weakness and headaches.   Endo/Heme/Allergies: Negative for environmental allergies and polydipsia. Does not bruise/bleed easily.   Psychiatric/Behavioral: Negative for depression, suicidal ideas, hallucinations, memory loss and substance abuse. The patient is not nervous/anxious and does not have insomnia.   All 14 systems reviewed and negative except as noted above.      PHYSICAL EXAM:    Constitutional: Appears well-developed, well-nourished and in no acute distress. Patient is oriented to person, place, and time. He is sitting up eating his breakfast  HEENT: Normocephalic and atraumatic. No maxillary sinus tenderness. External auditory canals clear and TMs intact without lesions. Nasal and oral mucosal membranes moist. Normal dentition and gums.   Neck: Neck supple no mass.   Cardiovascular: Normal rate and regular rhythm. S1 S2 appreciated by ascultation. Rare scattered wheeze in RUL.  Pulmonary/Chest: Effort normal and clear to auscultation bilaterally. No respiratory distress.   Abdomen: Soft. Non-tender and non-distended. Bowel sounds are normal.   Neurological: Moves all extremities.  Skin: Warm and dry. No lesions.  Extremities: No clubbing cyanosis. 1+ edema in BLE.      Laboratory Data:     Lab Results   Component Value Date    WBC 4.79  03/20/2017    HGB 10.1 (L) 03/20/2017    HCT 32.1 (L) 03/20/2017    MCV 96 03/20/2017     03/20/2017     BMP  Lab Results   Component Value Date     (L) 03/20/2017    K 4.2 03/20/2017    CL 92 (L) 03/20/2017    CO2 32 (H) 03/20/2017    BUN 20 03/20/2017    CREATININE 1.0 03/20/2017    CALCIUM 9.3 03/20/2017    ANIONGAP 9 03/20/2017    ESTGFRAFRICA >60 03/20/2017    EGFRNONAA >60 03/20/2017     Lab Results   Component Value Date    ALT 18 03/20/2017    AST 8 (L) 03/20/2017    ALKPHOS 55 03/20/2017    BILITOT 0.2 03/20/2017              Lab Results   Component Value Date     APTT 34.4 (H) 03/17/2017            Lab Results   Component Value Date     INR 1.8 (H) 03/17/2017     INR 1.1 11/24/2016     INR 1.1 11/14/2010            Radiology:     CXR 3/19/17: Persistent interstitial scarring left lung base with overall improvement since December 2016 chest x-ray.     ASSESSMENT/PLAN:   83 y/o male with Multiple myeloma admitted with acute on chronic resp failure, fevers, cough.     1. Multiple myeloma: On RevDex with good response thus far. He will f/u with Dr. Isabel after hospital discharge.  -- Can hold Revlimid during current hospital admission until infection cleared.  -- f/u with Dr. Isabel later this week upon discharge from hospital.     2. COPD, Hypoxic respiratory failure, acute on chronic with hypoxia and fevers, pneumonia: Due to PNA. On Vanc, Zosyn with ongoing, but lower fevers. O2 sats improved and able to wean down to 3L O2. Blood cultures negative.   -- Now on Moxifloxacin.      3. Macrocytic anemia: Has h/o VitB12 deficiency but receives B12 injections monthly.     Thank you for allowing us to participate in the care of this patent.      Dixie Vaughn M.D.  Hematology Oncology

## 2017-03-20 NOTE — DISCHARGE SUMMARY
[facility] Hospital Medicine  Discharge Summary      Patient Name: Lorne Roe  MRN: [id number]  Admission Date: 3/17/2017  Hospital Length of Stay: 3 days  Discharge Date and Time:  03/20/2017 12:40 PM  Attending Physician: Missy Moody MD   Discharging Provider: Missy Moody MD  Primary Care Provider: Randi Bush MD      HPI:   Lorne Roe is a 81 yo male with Multiple Myeloma (on Revlimid), chronic respiratory failure (3 L NC), COPD, HTN and CAD who was advised to present by Dr. Vaughn. He c/o fever, decreased appetite, weakness, SOB, productive cough with whitish sputum and having to increase 3L oxygen to 4 to 5 L. Has been exhibiting symptoms x 8 days.  In the ED, temp 100.6. CBC notes anemia; Chem notes hyponatremia & metabolic alkalosis. Influenza pending.  CXR -  Atelectasis and/or infiltrate in the left lung base at the costophrenic angle.          * No surgery found *      Indwelling Lines/Drains at time of discharge:   Lines/Drains/Airways          No matching active lines, drains, or airways        Hospital Course:   Admitted with suspected LLL Pneumonia and treated with IV Vanco/ Zosyn and felt much better with improved appetite. Revlimid was held. Blood Cx remained NGTD. All his symptoms resolved or improved. He is eating drinking well and walking well with PT.  was arranged for home OT, PT, SN and Health Aide. Dr. Vaughn followed the pt while in hospital. His repeat CXR again did not show any pneumonia but old scarring, hence switched to oral Avelox. He was seen and examined and deemed stable to be discharged home today with .       Consults:   Consults         Status Ordering Provider     Inpatient consult to Hematology/Oncology  Once     Provider:  Dixie Vaughn MD    Completed KASH PATRICK     Inpatient consult to Hospitalist  Once     Provider:  (Not yet assigned)    Acknowledged KASH PATRICK     Inpatient consult to Oncology  Once     Provider:  Dixie Vaughn MD     KASH Seo     IP consult to case management  Once     Provider:  (Not yet assigned)    Completed NICKO BURGESS     IP consult to dietary  Once     Provider:  (Not yet assigned)    Completed NICKO BURGESS          Significant Diagnostic Studies: Labs:   BMP:   Recent Labs  Lab 03/19/17  0631 03/20/17  0413   * 297*   * 133*   K 4.2 4.2   CL 93* 92*   CO2 30* 32*   BUN 17 20   CREATININE 1.0 1.0   CALCIUM 9.3 9.3   , CMP   Recent Labs  Lab 03/19/17  0631 03/20/17  0413   * 133*   K 4.2 4.2   CL 93* 92*   CO2 30* 32*   * 297*   BUN 17 20   CREATININE 1.0 1.0   CALCIUM 9.3 9.3   PROT 7.2 6.8   ALBUMIN 2.7* 2.7*   BILITOT 0.4 0.2   ALKPHOS 58 55   AST 12 8*   ALT 20 18   ANIONGAP 10 9   ESTGFRAFRICA >60 >60   EGFRNONAA >60 >60   , CBC   Recent Labs  Lab 03/19/17  0631 03/20/17  0413   WBC 3.10* 4.79   HGB 10.5* 10.1*   HCT 33.1* 32.1*    231    and All labs within the past 24 hours have been reviewed  Microbiology:   Blood Culture   Lab Results   Component Value Date    LABBLOO No Growth to date 03/17/2017    LABBLOO No Growth to date 03/17/2017    LABBLOO No Growth to date 03/17/2017     Imaging Results         X-Ray Chest PA And Lateral (Final result) Result time:  03/19/17 21:49:42    Final result by Shiraz Gandhi MD (03/19/17 21:49:42)    Impression:      Postoperative chest x-ray.  Persistent interstitial scarring left lung base with overall improvement since December 2016 chest x-ray.      Electronically signed by: SHIRAZ GANDHI MD  Date:     03/19/17  Time:    21:49     Narrative:    Exam: XR CHEST PA AND LATERAL,    Date:  03/19/17 21:46:19    History: LLL pneumonia    Comparison:  Comparison with multiple prior studies including 12/02/2016, 12/21/2016, 01/25/2017 and 03/17/2017    Findings: Sternal wires are present.  Postop changes cervical spine.    Heart size is normal.    The right lung is clear.    There are chronic residual interstitial markings at  the left lung base.  This pattern appears to be chronic.  The more pronounced atelectatic markings and pleural effusion evident in December have improved.            X-Ray Chest AP Portable (SOB) (Final result) Result time:  03/17/17 13:05:31    Final result by Michael Quijano MD (03/17/17 13:05:31)    Impression:     Atelectasis and/or infiltrate in the left lung base at the costophrenic angle.            Electronically signed by: MICHAEL QUIJANO MD  Date:     03/17/17  Time:    13:05     Narrative:    Exam: Portable chest radiograph    Clinical History: Shortness of breath.     Comparison: Chest x-ray, 01/25/2017.    Findings:.     The right lung is clear.  In the left costophrenic angle there is some atelectasis and or infiltrate. The cardiac silhouette is within normal limits. Are wires are noted.  Anterior cervical spinal fusion hardware is noted.            Pending Diagnostic Studies:     None        Final Active Diagnoses:    Diagnosis Date Noted POA    PRINCIPAL PROBLEM:  Acute on chronic respiratory failure [J96.20] 02/02/2017 Yes    COPD (chronic obstructive pulmonary disease) [J44.9] 03/17/2017 Yes    Chronic coronary artery disease [I25.10] 03/01/2016 Yes     Chronic    Immunocompromised patient [D84.9] 03/17/2017 Yes    Multiple myeloma [C90.00] 12/30/2016 Yes    Hypertension [I10]  Yes     Chronic    Chronic diastolic heart failure [I50.32] 03/17/2017 Yes      Problems Resolved During this Admission:    Diagnosis Date Noted Date Resolved POA    Pneumonia [J18.9] 03/17/2017 03/20/2017 Yes    Acute on chronic respiratory failure with hypoxia [J96.21]  03/19/2017 Yes    Pneumonia of left lung due to infectious organism [J18.9]  03/19/2017 Yes      No new Assessment & Plan notes have been filed under this hospital service since the last note was generated.  Service: Hospital Medicine      Discharged Condition: stable    [place] [facility]    Follow Up:  Follow-up Information     Follow  up with Randi Bush MD In 1 week.    Specialty:  Family Medicine    Contact information:    [address] [address] [phone]          Follow up with Zheng Isabel MD In 3 days.    Specialty:  Hematology and Oncology    Contact information:    [address] [address] [phone]          Follow up with Randi Bush MD.    Specialty:  Family Medicine    Contact information:    [address] [address] [phone]          Patient Instructions:     Ambulatory referral to Outpatient Case Management   Referral Priority: Routine Referral Type: Consultation   Referral Reason: Specialty Services Required    Number of Visits Requested: 1      Ambulatory referral to Home Health   Referral Priority: Routine Referral Type: Home Health   Referral Reason: Specialty Services Required    Requested Specialty: Home Health Services    Number of Visits Requested: 1      Diet general   Order Specific Question Answer Comments   Na restriction, if any: 2gNa    Additional restrictions: Cardiac (Low Na/Chol)      Activity as tolerated       Medications:  Reconciled Home Medications:   Current Discharge Medication List      START taking these medications    Details   levoFLOXacin (LEVAQUIN) 500 MG tablet Take 1 tablet (500 mg total) by mouth once daily.  Qty: 10 tablet, Refills: 0         CONTINUE these medications which have NOT CHANGED    Details   acyclovir (ZOVIRAX) 400 MG tablet Take 1 tablet (400 mg total) by mouth 2 (two) times daily.  Qty: 60 tablet, Refills: 0      albuterol 90 mcg/actuation inhaler Inhale 2 puffs into the lungs every 6 (six) hours as needed for Shortness of Breath.  Qty: 3 Inhaler, Refills: 3    Associated Diagnoses: Chronic obstructive pulmonary disease, unspecified COPD type      albuterol-ipratropium 2.5mg-0.5mg/3mL (DUO-NEB) 0.5 mg-3 mg(2.5 mg base)/3 mL nebulizer solution Take 3 mLs by nebulization every 6 (six) hours while awake.  Qty: 360  vial, Refills: 3    Associated Diagnoses: Chronic obstructive pulmonary disease, unspecified COPD type      ammonium lactate (AMLACTIN) 12 % lotion Use daily.  Apply to damp skin after bathing.  Qty: 225 g, Refills: 11    Associated Diagnoses: Contact dermatitis and other eczema, due to unspecified cause      aspirin (ECOTRIN) 81 MG EC tablet Take 1 tablet (81 mg total) by mouth once daily.  Qty: 30 tablet, Refills: 0      atenolol (TENORMIN) 25 MG tablet Take 0.5 tablets (12.5 mg total) by mouth once daily.  Qty: 15 tablet, Refills: 0    Associated Diagnoses: HTN (hypertension)      buPROPion (WELLBUTRIN) 75 MG tablet Take 1 tablet (75 mg total) by mouth once daily.  Qty: 90 tablet, Refills: 3    Associated Diagnoses: Anxiety and depression      clonazePAM (KLONOPIN) 0.5 MG tablet Take 1 tablet (0.5 mg total) by mouth once daily.  Qty: 60 tablet, Refills: 0    Associated Diagnoses: Anxiety      clopidogrel (PLAVIX) 75 mg tablet Take 1 tablet (75 mg total) by mouth once daily.  Qty: 30 tablet, Refills: 0      clotrimazole (LOTRIMIN) 1 % cream Apply topically as needed.  Qty: 1 Tube, Refills: 3    Associated Diagnoses: Fungal infection of skin      cyanocobalamin 1,000 mcg/mL injection Inject 1 mL (1,000 mcg total) into the muscle every 30 days.  Qty: 10 mL, Refills: 1    Associated Diagnoses: B12 deficiency      dexamethasone (DECADRON) 4 MG Tab 5 tablets po bid one day a week every week  Qty: 40 tablet, Refills: 6    Associated Diagnoses: Multiple myeloma, remission status unspecified      docusate sodium (COLACE) 100 MG capsule Take 1 capsule (100 mg total) by mouth 2 (two) times daily.  Qty: 60 capsule, Refills: 0      ergocalciferol (VITAMIN D2) 50,000 unit Cap TAKE 1 CAPSULE EVERY 7 DAYS  Qty: 4 capsule, Refills: 0    Associated Diagnoses: Vitamin D deficiency disease      fluticasone (FLONASE) 50 mcg/actuation nasal spray 1 spray by Each Nare route once daily.  Qty: 1 Bottle, Refills: 0    Associated  Diagnoses: Acute upper respiratory infections of unspecified site      fluticasone-salmeterol 250-50 mcg/dose (ADVAIR DISKUS) 250-50 mcg/dose diskus inhaler Inhale 1 puff into the lungs 2 (two) times daily.  Qty: 180 each, Refills: 3    Associated Diagnoses: Chronic obstructive pulmonary disease, unspecified COPD type      furosemide (LASIX) 40 MG tablet Take 1 tablet (40 mg total) by mouth 2 (two) times daily.  Qty: 180 tablet, Refills: 4    Associated Diagnoses: Localized edema      gabapentin (NEURONTIN) 600 MG tablet TAKE 1 TABLET TWICE A DAY AND 2 TABLETS AT BEDTIME  Qty: 270 tablet, Refills: 0      lenalidomide (REVLIMID) 10 mg Cap One tablet daily from day 1 to day 21 then 7 days off  Qty: 21 each, Refills: 0    Associated Diagnoses: Multiple myeloma not having achieved remission      levothyroxine (SYNTHROID) 25 MCG tablet Take 1 tablet (25 mcg total) by mouth once daily.  Qty: 30 tablet, Refills: 0      magnesium oxide (MAG-OX) 400 mg tablet Take 1 tablet (400 mg total) by mouth 2 (two) times daily.  Qty: 60 tablet, Refills: 0      meloxicam (MOBIC) 7.5 MG tablet Take 7.5 mg by mouth.      midodrine (PROAMATINE) 2.5 MG Tab Take 2 tablets (5 mg total) by mouth 2 (two) times daily with meals.  Qty: 60 tablet, Refills: 0      nitroGLYCERIN (NITROSTAT) 0.4 MG SL tablet Place 1 tablet (0.4 mg total) under the tongue every 5 (five) minutes as needed.  Qty: 30 tablet, Refills: 3    Associated Diagnoses: Multiple myeloma, remission status unspecified      nystatin (MYCOSTATIN) cream Apply topically 2 (two) times daily.  Qty: 30 g, Refills: 0      ondansetron (ZOFRAN) 4 MG tablet Take 1 tablet (4 mg total) by mouth every 8 (eight) hours as needed for Nausea.  Qty: 20 tablet, Refills: 0    Associated Diagnoses: Nausea      oxycodone-acetaminophen (PERCOCET)  mg per tablet Take 1 tablet by mouth every 6 (six) hours as needed for Pain.  Qty: 60 tablet, Refills: 0    Associated Diagnoses: Multiple myeloma not  having achieved remission      pantoprazole (PROTONIX) 40 MG tablet Take 1 tablet (40 mg total) by mouth once daily.  Qty: 30 tablet, Refills: 0      promethazine-dextromethorphan (PROMETHAZINE-DM) 6.25-15 mg/5 mL Syrp Take by mouth.      rosuvastatin (CRESTOR) 10 MG tablet Take 1 tablet (10 mg total) by mouth every evening.  Qty: 30 tablet, Refills: 0      tamsulosin (FLOMAX) 0.4 mg Cp24 Take 1 capsule (0.4 mg total) by mouth once daily.  Qty: 30 capsule, Refills: 0         STOP taking these medications       nystatin (MYCOSTATIN) powder Comments:   Reason for Stopping:             Time spent on the discharge of patient: 51 minutes    Missy Moody MD  Department of Hospital Medicine  [facility]

## 2017-03-20 NOTE — PLAN OF CARE
Problem: Patient Care Overview  Goal: Plan of Care Review  Outcome: Ongoing (interventions implemented as appropriate)  Patient currently resting quietly in bed. Patient awake, alert, oriented x4. VS currently stable. Patient NSR on monitor at this time. Patient receiving O2 @ 3.5L nasal cannula. Fall prevention reviewed with patient. Patient educated and encouraged to use the call light for any needs. Patient call light within reach. Patient free of falls. Patient complained of generalized back pain earlier in shift which was relieved by prn pain medication. Patient has no complaints of pain at this time. Plan of care reviewed with patient. Patient has no further questions about plan of care at this time. Will continue to monitor.

## 2017-03-20 NOTE — PROGRESS NOTES
Please note the following patient has been assigned to  Елена Ruvalcaba RN with Outpatient Complex Care Mgmt for screening.    Please contact [facility] at ext [phone] with questions.    Thank you    Radha Brewer, SSC

## 2017-03-20 NOTE — NURSING
Patient discharged home with spouse. Patient is AAOx4. Patient denies pain and discomfort at this time. Discharge instructions given to patient and spouse. Patient and spouse verbalized understanding.

## 2017-03-22 LAB
BACTERIA BLD CULT: NORMAL
BACTERIA BLD CULT: NORMAL

## 2017-03-23 ENCOUNTER — OUTPATIENT CASE MANAGEMENT (OUTPATIENT)
Dept: ADMINISTRATIVE | Facility: OTHER | Age: 82
End: 2017-03-23

## 2017-03-23 NOTE — PROGRESS NOTES
3/23/17 - Attempted phone contact with pt at numbers on EMR with no answer. Voice mail left on home number. CM will attempt to follow up at a later time. Елена Ruvalcaba RN

## 2017-03-24 ENCOUNTER — OUTPATIENT CASE MANAGEMENT (OUTPATIENT)
Dept: ADMINISTRATIVE | Facility: OTHER | Age: 82
End: 2017-03-24

## 2017-03-24 NOTE — PROGRESS NOTES
3/24/17 - Phone contact with pt's wife today to complete Initial Screen for OPCM. She relayed that pt is very weak and they are very interested in getting him a motorized scooter, but will also need a ramp built as [place]. She verbalizes good understanding of meds and diet and follows a low Na diet. He is participating in PT with [facility]. Advised CM will contact Dr Isabel and Dr Szymanski related to scooter order to see if either will assist with obtaining as she reports pt is no longer seeing Dr Bush as his PCP. Will follow up at a later time. Елена Ruvalcaba RN

## 2017-03-27 ENCOUNTER — OUTPATIENT CASE MANAGEMENT (OUTPATIENT)
Dept: ADMINISTRATIVE | Facility: OTHER | Age: 82
End: 2017-03-27

## 2017-03-27 ENCOUNTER — TELEPHONE (OUTPATIENT)
Dept: HEMATOLOGY/ONCOLOGY | Facility: CLINIC | Age: 82
End: 2017-03-27

## 2017-03-27 ENCOUNTER — TELEPHONE (OUTPATIENT)
Dept: PULMONOLOGY | Facility: CLINIC | Age: 82
End: 2017-03-27

## 2017-03-27 NOTE — TELEPHONE ENCOUNTER
----- Message from Елена Ruvalcaba RN sent at 3/27/2017  8:13 AM CDT -----  Contact: Елена Hurtado,  Yes, it does take a long time and I will reinforce that with them when I let them know Dr Isabel will write the order for him. Thank you for your prompt response.   Елена DE LEON  ----- Message -----     From: Genesis Mcleod MA     Sent: 3/24/2017   4:03 PM       To: CARLY Holland Dr Fabrega is willing to sign the orders for him to get a scooter but its a long process of being evaluated by OT/PT  At the clinic. Just so they know, it's not just writing a prescription and then he has to meet criteria.  Thanks  Kenisha Isabel's office  ----- Message -----     From: Елена Ruvalcaba RN     Sent: 3/24/2017   3:25 PM       To: Maame ENNIS Staff, Stephon TAPIA Staff    Dr Isabel/Dr Szymanski,    The above pt is followed by each of you [facility]. [facility] wife is requesting an order for a motorized scooter related to increasing weakness and very diminished endurance. She asked if either of you would write an order for this. He is no longer seeing Dr Bush and does not want me to ask her for the order.    Thank you,    Елена NOGUEIRA, RN  Outpatient Case Management  [phone]  Ext [phone]  [email or web address]

## 2017-03-27 NOTE — TELEPHONE ENCOUNTER
----- Message from Елена Ruvalcaba RN sent at 3/24/2017  3:25 PM CDT -----  Contact: Елена Isabel/Dr Szymanski,    The above pt is followed by each of you and [facility]. His wife is requesting an order for a motorized scooter related to increasing weakness and very diminished endurance. She asked if either of you would write an order for this. He is no longer seeing Dr Bush and does not want me to ask her for the order.    Thank you,    Елена NOGUEIRA, RN  Outpatient Case Management  [phone]  Ext [phone]  [email or web address]

## 2017-03-27 NOTE — PROGRESS NOTES
3/27/17 - Attempted phone contact with pt/wife with no answer. Voice mail left advising Dr Isabel agrees to write order for motorized scooter, but wants pt to understand that it is a lengthy process may take several weeks. CM will follow up at a later time. Елена Ruvalcaba RN

## 2017-03-29 ENCOUNTER — OUTPATIENT CASE MANAGEMENT (OUTPATIENT)
Dept: ADMINISTRATIVE | Facility: OTHER | Age: 82
End: 2017-03-29

## 2017-03-29 NOTE — PROGRESS NOTES
An OPCM welcome Packet and consent form was created and mailed to the patient on 3/29/2017        Thank you,  Cecilia Sanchez, SSC

## 2017-03-29 NOTE — LETTER
March 29, 2017    Lorne Roe  [address]  [facility] [address]             Outpatient Case Management  [address]  [facility] [address] Dear Lorne Roe:    We understand that receiving many services from different doctors and healthcare providers is overwhelming. There are appointments to make, transportation to arrange, dietary instructions to understand, and new medications to obtain.    This is where [facility] can help.     You are eligible to receive Outpatient Case Management services when you have healthcare needs that require the coordination of many providers, treatments, and services. You also qualify if you need assistance with a new treatment plan.     There is no charge for this support. You may have been referred to this program from your doctor(s), hospital staff member(s), or insurance company but you always have a choice to participate or not participate. To participate, you must give us your permission to be enrolled.     When you are enrolled in the [facility] program, the  who is assigned to you is    Елена Ruvalcaba RN  [phone]    Depending on your needs and wishes, your  may speak with you by phone, visit you at your place of living (for example your home, skilled nursing facility, or rehabilitation facility), or meet you at your doctors office.     Your  will tell you why you have been selected to participate in the program and will complete an assessment of your needs. Then a personalized plan of care will be developed with you and or your caregiver.             Here are examples of the services your [facility]  provides.     Coordinate communication among multiple providers.   Arrange for transportation, doctors visits, durable medical equipment, home care services, and special clinics.    Provide coaching on how to manage your health condition.    Answer  questions about your health condition.   Help you understand your doctors treatment plan.    Provide additional instruction about your health condition, treatments, and medications.    Help you obtain information about your insurance coverage.    Advocate for your individual needs.    Request a Licensed Clinical  (LCSW) to visit you if you need their services. LCSWs help with long term planning (discussing placement options, advanced planning directives), financial planning, and assistance (for example rent, utilities, medication funding).     Your  will coordinate their activities with other outpatient services you are receiving. All services provided by [facility]  are coordinated with and communicated to your primary care physician.    Our goal is to help you manage your health condition(s) safely within your living environment, whether that is your home or a medical facility. We want to help you function at the healthiest and highest level possible.     Sincerely,      Daniel Limon MD  Medical Director    Enclosures:    Frequently Asked Questions  Patient Rights and Responsibilities   Reporting a Grievance or Complaint  Consent/Release of Information  Stamped Addressed Envelope                  Frequently Asked Questions about [facility]    What is [facility]?  Outpatient Case management is not Home healthcare services. [facility]  do not provide hands-on care. [facility]  will work with your doctor to arrange for home health services, if needed. Home health services have a limited duration and there are some restrictions as to who can get these services. There is no prescribed limit to the amount of time you receive [facility] services. [facility]  are not agents of your insurance company. However, [facility] Case  Managers can help you obtain information from your insurance company.     Who are the [facility] ?  [facility]  are Registered Nurses and Social Workers. It is important to remember that you and your  are a team that works together with your primary care physician to create your individualized plan of care. The ultimate goal of your care plan is to help you implement your doctors treatment plan and to help you function at the highest level of health possible.     What are my rights as a patient?  It is important for you to know and understand your rights and responsibilities while receiving services from the [facility] program. We have enclosed a complete description of your rights and responsibilities. You can help to make your care more effective when you understand your right and responsibilities.     What is needed to be enrolled in the program?  You are only enrolled in the [facility] Program when you give us your consent to participate. You will find enclosed a consent form. You are receiving this letter because you or your caregivers have given us a verbal consent to enroll you in [facility] Case Management Program. We ask that you sign and return the enclosed written consent in the stamped self-addressed envelope.                           Patient Rights and Responsibilities    We consider you a partner in your care. When you are well informed, participate in treatment decisions and communicate openly with your doctor and other healthcare professionals, you help make your care more effective.     While you are in the Outpatient Case Management Program, your rights include the following:     You have a right to be provided services in a non-discriminatory manner in accordance with the provisions of Title VI of the Civil Rights Act of 1964, Section 504 of the Rehabilitation Act of 1973, the Age  Discrimination Act of 1975, the Americans with Disabilities Act as well as any other applicable Federal and State laws and regulations.     You have the right to a reasonable, timely response to your request or need for care, as well as the right to considerate and respectful care including an environment that preserves dignity and contributes to a positive self-image. You are responsible for being considerate and respectful of our staff.     You have a right to information regarding patient rights, advocacy services and complaint mechanisms, and the right to prompt resolution of any complaint. You or a designee has the right to participate in the resolution of ethical issues surrounding your care. You have a right to file a complaint if you feel that your rights have been infringed, without fear or penalty from [facility] or the federal government. You may file a complaint with the Director of Outpatient Case Management by calling [phone]. At any time, you may lodge a grievance with the [facility] and [facility] by calling [phone], or the Joint Commission on Accreditation of Healthcare Organizations at [phone].     You, or someone acting on your behalf, have the right to understandable information on your health status, treatment and progress in order to make decisions. You have the right to know the nature, risks and alternatives to treatment. You have the right to be informed, when appropriate, regarding the outcome of the care that has been provided. You have the right to refuse treatment to the extent permitted by law, and the right to be informed of the alternatives and consequences of refusing treatment.     You, in collaboration with your physician, have the right to make decisions regarding care and the right to participate in the development and implementation of the plan of care and effective pain management. You have the right to know the name and professional status of  those responsible for the delivery of your care and treatment.       You have a right, within legal guidelines, to have a guardian, next-of-kin or legal designee exercises your patient rights when you are unable to do so. You have the right for your wishes regarding end-of-life decisions to be addressed by the healthcare team through advance directives. You have the right to personal privacy and confidentiality and to expect confidentiality of all records and communications pertaining to your care.      You have the right to receive communications about your health information confidentially. You have the right to request restrictions on the uses and disclosures of your health information. You have the right to inspect, copy, request amendments and receive an accounting of to whom we have disclosed your health information.     You have the right to be provided with interpretation services if you do not speak English; to alternative communication techniques if you are hearing or vision impaired; and to have any other resources needed on your behalf to ensure effective communication. These services are provided free of charge.     You have a right to personal safety (free from mental, physical, sexual and verbal abuse, neglect and exploitation). You have the right to access protective and advocacy services.     Advance Directives  A Patient Advocate is available to meet with patients to answer questions regarding advance directives.    Living Will  A document that outlines what medical treatment the patient does or does not want in the event the patient becomes unable to make those decisions at the appropriate time.    Durable Medical Power of   A document by which the patient designates an individual to be responsible for making medical decisions in the event the patient becomes unable to do so.    HIPAA Notice of Privacy Practices  Your medical information is governed by federal privacy laws. HIPAA  protects private medical information and how that information is disclosed. If you have a question regarding the HIPAA Notice of Privacy Practices, or if you believe your privacy rights have been violated, you may call our designated hotline at ([phone].            Quality Improvement  Because we consistently strive to improve the care and service provided to our patients, we welcome your feedback. Your comments are an important part of our quality improvement process, as we like to know what we are doing right and which areas are in need of improvement. Our policy is to listen, be responsive and provide you with an appropriate and timely follow-up to your questions or concerns. Our goal is active patient and family involvement in all aspects of the care process.                                                                                  Reporting a Grievance or Complaint    During your time with the [facility] team you may have a grievance or complaint with our services. Your Patients Bill of Rights gives patients, families, and caregivers the right to express concerns and grievances and the right to expect a reasonable and timely response.     Your presentation of your concerns is not viewed negatively. It is an opportunity for us to improve the quality of our care and services we provide to you.     You may report your concerns directly to your , or you can phone in a complaint to:     Director of Outpatient Case Management  [phone]    You may also send a complaint letter to:    Director of Outpatient Case Management Services  [address] [place] [id number]    Tell us the details of your complaint and provide us with a contact phone number so we can contact you to obtain additional information. We will return a call to you within two business days of our receipt of your complaint, and to request additional information as needed. If you choose to  mail a letter, your complaint may take a few days longer to reach us.     All grievances will be addressed as quickly as possible. A grievance or complaint that involves situations or practices which place patients in immediate danger will be addressed as an urgent matter. We will work to resolve all other complaints within seven days of receipt. By that time, you will receive a phone call with either the resolution of your complaint, or a plan for corrective action. A formal written response will be sent to you within 30 days of receipt of your grievance.     If a resolution cannot be completed within 30 days, a letter will be sent to you or your family member with an estimated time for the final response.    Additionally, all patients have the right to file complaints with external agencies, without exception. Complaints/grievances can be addressed to the following agencies:            Patient Safety or Quality of Care Concerns  Office of Quality Monitoring   [employer] [facility] [place] [address]  [phone] Toll Free    HIPPA Privacy/Security Concerns  Office for Civil Rights Region IV  [place] Department of Health & Human Services  [address] [place] [address], [address]  [place] [address] [phone] Phone  [phone] TDD  [phone] Toll Free    Medicare/Medicaid Billing Concerns  [place] for Medicare & Medicaid Services  Region [address]  [address] [place] [address], [address]  [place] [address] [phone] Phone  [phone] Toll Free    General Concerns  [place] [facility] ([facility])  ([phone] [phone] Toll Free Complaint Hotline                                                              Consent Form/Release of Information    By signing--     (1) I agree I have read the Outpatient Case Management information provided to me;     (2) I agree to voluntarily participate in the Outpatient Case Management program;     (3) I understand I must consent to  participation in the Outpatient Case Management program during my first interview with my ;    (4) I consent to the discussion and release of my personal health information to my healthcare team (including my personal physician, my medical home care team, any specialty physician(s), and my [facility] team);     (5) I agree my consent is valid for the length of time I am receiving Outpatient Case Management;    (6) I agree to referrals to community resources which my Case Management team recommends for me. I agree to the release of my personal information and personal health information as necessary to referral sources.    ___________________________________________________________________  Patients Printed Name     ___________________________________________________________________  Patients Signature       Date    If patient is in being cared for, please complete this section:     ___________________________________________________________________  Printed Name of Person Caring For Patient   Relationship To Patient    ___________________________________________________________________   Signature of Person Caring For Patient     Date    PLEASE SIGN AND RETURN IN THE ENCLOSED PRE-ADDRESSED ENVELOPE.

## 2017-03-30 DIAGNOSIS — C90.00 MULTIPLE MYELOMA NOT HAVING ACHIEVED REMISSION: ICD-10-CM

## 2017-03-31 ENCOUNTER — INFUSION (OUTPATIENT)
Dept: INFUSION THERAPY | Facility: HOSPITAL | Age: 82
End: 2017-03-31
Attending: INTERNAL MEDICINE
Payer: MEDICARE

## 2017-03-31 ENCOUNTER — OUTPATIENT CASE MANAGEMENT (OUTPATIENT)
Dept: ADMINISTRATIVE | Facility: OTHER | Age: 82
End: 2017-03-31

## 2017-03-31 ENCOUNTER — OFFICE VISIT (OUTPATIENT)
Dept: HEMATOLOGY/ONCOLOGY | Facility: CLINIC | Age: 82
End: 2017-03-31
Payer: MEDICARE

## 2017-03-31 VITALS — BODY MASS INDEX: 31.94 KG/M2 | HEIGHT: 67 IN | WEIGHT: 203.5 LBS

## 2017-03-31 DIAGNOSIS — E83.42 HYPOMAGNESEMIA: ICD-10-CM

## 2017-03-31 DIAGNOSIS — D51.8 OTHER VITAMIN B12 DEFICIENCY ANEMIA: ICD-10-CM

## 2017-03-31 DIAGNOSIS — C90.00 MULTIPLE MYELOMA NOT HAVING ACHIEVED REMISSION: ICD-10-CM

## 2017-03-31 DIAGNOSIS — D51.8 OTHER VITAMIN B12 DEFICIENCY ANEMIA: Primary | ICD-10-CM

## 2017-03-31 DIAGNOSIS — C90.01 MULTIPLE MYELOMA IN REMISSION: Primary | ICD-10-CM

## 2017-03-31 PROCEDURE — 99214 OFFICE O/P EST MOD 30 MIN: CPT | Mod: S$GLB,,, | Performed by: INTERNAL MEDICINE

## 2017-03-31 PROCEDURE — 99499 UNLISTED E&M SERVICE: CPT | Mod: S$GLB,,, | Performed by: INTERNAL MEDICINE

## 2017-03-31 PROCEDURE — 96372 THER/PROPH/DIAG INJ SC/IM: CPT | Mod: PO

## 2017-03-31 PROCEDURE — 63600175 PHARM REV CODE 636 W HCPCS: Mod: PO | Performed by: INTERNAL MEDICINE

## 2017-03-31 PROCEDURE — 1160F RVW MEDS BY RX/DR IN RCRD: CPT | Mod: S$GLB,,, | Performed by: INTERNAL MEDICINE

## 2017-03-31 PROCEDURE — 99999 PR PBB SHADOW E&M-EST. PATIENT-LVL I: CPT | Mod: PBBFAC,,, | Performed by: INTERNAL MEDICINE

## 2017-03-31 PROCEDURE — 1159F MED LIST DOCD IN RCRD: CPT | Mod: S$GLB,,, | Performed by: INTERNAL MEDICINE

## 2017-03-31 PROCEDURE — 1157F ADVNC CARE PLAN IN RCRD: CPT | Mod: S$GLB,,, | Performed by: INTERNAL MEDICINE

## 2017-03-31 RX ORDER — LANOLIN ALCOHOL/MO/W.PET/CERES
400 CREAM (GRAM) TOPICAL 2 TIMES DAILY
Qty: 60 TABLET | Refills: 2 | Status: SHIPPED | OUTPATIENT
Start: 2017-03-31 | End: 2017-05-01 | Stop reason: SDUPTHER

## 2017-03-31 RX ORDER — CYANOCOBALAMIN 1000 UG/ML
1000 INJECTION, SOLUTION INTRAMUSCULAR; SUBCUTANEOUS
Status: DISCONTINUED | OUTPATIENT
Start: 2017-03-31 | End: 2017-03-31 | Stop reason: HOSPADM

## 2017-03-31 RX ORDER — LENALIDOMIDE 10 MG/1
CAPSULE ORAL
Qty: 21 EACH | Refills: 0 | Status: SHIPPED | OUTPATIENT
Start: 2017-03-31 | End: 2017-05-08 | Stop reason: SDUPTHER

## 2017-03-31 RX ORDER — OXYCODONE AND ACETAMINOPHEN 10; 325 MG/1; MG/1
1 TABLET ORAL EVERY 6 HOURS PRN
Qty: 60 TABLET | Refills: 0 | Status: SHIPPED | OUTPATIENT
Start: 2017-03-31 | End: 2017-05-01 | Stop reason: SDUPTHER

## 2017-03-31 RX ORDER — CYANOCOBALAMIN 1000 UG/ML
1000 INJECTION, SOLUTION INTRAMUSCULAR; SUBCUTANEOUS
Status: CANCELLED | OUTPATIENT
Start: 2017-04-01

## 2017-03-31 RX ADMIN — CYANOCOBALAMIN 1000 MCG: 1000 INJECTION, SOLUTION INTRAMUSCULAR at 12:03

## 2017-03-31 NOTE — NURSING
1206  3/31/17  Injection given without difficulties.Bandaid applied. Patient instructed to stay in the clinic for 15 minutes. Patient verbalized understanding and will notify nurse with any complaints.
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
## 2017-03-31 NOTE — LETTER
March 31, 2017    Lorne Roe  [address]  [place] [address]             [facility] [address] [facility] [address] Dear Mr Mrs Roe,    Thank you for your time in getting admitted to Outpatient Case Management. I have included some educational brochures in this mail out along with my business card.    Please review the enclosed literature and call me if you have any questions.     Thanks again for your time and cooperation,    Елена Ruvalcaba RN  Outpatient Case Management  [phone]  Ext [phone]

## 2017-03-31 NOTE — PATIENT INSTRUCTIONS
Preventing Falls in the Home  As you get older, falls are more likely for many reasons. One reason is because your reaction time slows. Your muscles and joints may also get stiffer, making them less flexible and therefore more prone to injury. Illness, medicines, and vision changes can also affect your balance. This increases your risk of falling. A fall could leave you unable to live on your own. To make your home safer, follow these tips:   Floors  Make floors safer by doing the following:   · Put nonskid pads under area rugs.  · Remove throw rugs.  · Replace worn floor coverings.  · Tack carpets firmly to each step on carpeted stairs. Put nonskid strips on the edges of uncarpeted stairs.  · Keep floors and stairs free of clutter and cords.  · Arrange furniture so there are clear pathways.  · Clean up any spills right away.  Bathrooms  Make bathrooms safer by doing the following:   ·   Install grab bars in the tub or shower.  · Apply nonskid strips or put a nonskid rubber mat in the tub or shower.  · Sit on a bath chair to bathe.  · Use bathmats with nonskid backing.  Lighting  Tips to light your way:   · Keep a flashlight in each room.  · Put a nightlight along the pathway between the bedroom and the bathroom.  Date Last Reviewed: 8/1/2016 © 2000-2016 [employer]. [address] [place] [address]. All rights reserved. This information is not intended as a substitute for professional medical care. Always follow your healthcare professional's instructions.        Preventing Falls: Making Changes in Your Living Space  Is your living space filled with hazards that could cause you to fall? Changes can make you safer. They could even save your life. Take a careful look around your home. Change what you can on your own. Hire someone or ask friends or family to help with harder tasks.     Be sure to add a nonslip mat to the inside of your shower or bathtub. Always keep a nightlight on.   Remove  hazards  · Remove things that can trip you, like throw rugs, boxes, piles of paper, or cords.  · Nail down rugs or carpeting if you don't want to remove them.  · Don't store items on stairs.  · Keep walkways clear.  · Clean up spills right away.  · Replace glass tables with wooden ones. They're safer if you fall.  Add safety devices  · Add handrails to both sides of stairs.  · Buy a raised toilet seat.  · Add grab bars near the toilet and in the shower.  · Get grabbers to help you reach things and avoid climbing.  Improve lighting  · Add nightlights to halls, bedrooms, and bathrooms.   · Put light switches at the top and bottom of stairs.  · Be sure each room and flight of stairs has proper lighting.  · Use shades or curtains to cut glare from windows.  · Put flashlights in each room. Replace burned-out bulbs.  · Get glowing light switches for room entrances.  Take other precautions  · Use nonskid floor wax.  · Buy a nonslip mat and a liquid soap dispenser for the shower.  · Tack down carpets or use slip-resistant backing.  · Put most-used items within easy reach.  · Add bright paint or tape on the top front edge of steps.  · Save big jobs, such as moving furniture or other heavy objects, for family or friends.  · Get professional help installing grab bars. They can be unsafe if not installed the right way.  Fix riskier rooms first  Don't tackle everything at once. Focus on one room at a time. The bathroom is a common spot for falls, so you may start there. Or start with a room you spend lots of time in, such as your bedroom. Make only a few changes at once. This will give you time to adjust to them.           Keep a clear path from your bed to the bathroom. Move items from higher shelves to lower ones.   Outside your home  You might arrange for these changes yourself, or you might need to talk to your  or homeowners' association about them.  · Have loose boards on porches or damaged stairs  repaired.  · Have rough edges, holes, or large cracks in sidewalks or driveways repaired.  · Have hazards that could trip you, such as hoses or henok, removed.  · Use high-wattage light bulbs (100 or greater) near outside doors and stairs.  · Get handrails added to outside stairs. Have them extend beyond the bottom step.  · Get help in winter weather with ice or snow removal.   Date Last Reviewed: 6/12/2015 © 2000-2016 [employer]. [address] [place] [address], [place] [address]. All rights reserved. This information is not intended as a substitute for professional medical care. Always follow your healthcare professional's instructions.        Preventing Falls: Moving Safely Out of a Chair and Bed     Find sturdy, high-seated chairs with arms. If you must use a chair that swivels or has wheels, back it against something stable before you sit down          If you use a walker, place it close to the side of the bed. Don't lean on the walker to stand up. Instead, put your hands on the bed to slowly push yourself up.      You can move safely, whether youre at home, in a hospital, or out and about. Plan your movements. Dont rush. The phone or doorbell can wait. And learn how to perform daily tasks safely, like getting in and out of a chair or bed. The more careful you are, the less likely you are to fall.  Getting in and out of a chair  When you can, choose chairs with long armrests.  To sit down:  · Back up until you feel the chair against the backs of your legs.  · Grasp the armrests with both hands and sit down.  To stand up:  · Scoot to the edge of the chair. Place both feet firmly on the floor.  · Grasp the armrests or put both hands on your thighs and slowly push yourself up.  Getting out of bed  If you're using a hospital bed, make sure it's locked. Put the head of the bed up.  To get up:  · Move to the edge of the bed and roll onto your side. Push yourself up with your hand. At the same time, swing  your legs over the side of the bed.  · Sit on the edge of the bed for at least 60 seconds before standing up.  · With both feet firmly on the floor, put your hands beside you on the bed and slowly push yourself up.  · If you feel lightheaded or dizzy, sit back down right away.  Date Last Reviewed: 6/12/2015  © 1630-2349 [employer]. [address] [place] [address], [place] [address]. All rights reserved. This information is not intended as a substitute for professional medical care. Always follow your healthcare professional's instructions.        Preventing Falls: Moving Safely Using a Cane or Walker     Keep the cane away from your feet so you dont trip.     A walking aid, such as a cane or walker, can help you stay more independent and avoid falls. Remember to keep your walking aid within easy reach when you're in a chair or in bed. And learn how to use it safely so you don't injure yourself. Be sure the cane or walker is the correct height. Hang your arm loosely at your side, and measure the distance from your wrist to the floor. The distance should be the same as the height of your cane or walker.  Using a cane  If you have a stronger side, hold the cane on the side of your stronger leg.  1. Get your balance.  2. Move the cane and your weaker leg forward.  3. Support your weight on both the cane and your weaker side.  4. Step with your stronger leg.  5. Start again from step 1.     If youre using a folding walker, be sure you know how to lock it open. Check that its locked open before each use.   Using a walker  1. Roll the walker (or lift it, if you're using one without wheels) forward about 12 inches.  2. Step forward with your weaker leg first.  3. Use the walker to help keep your balance.  4. Bring your other foot forward to the center of the walker.  5. Start again from step 1.  Helpful tips  · Check with your health care provider about the right walking aid to use. Ask about a walker with a seat  attached.  · Check the tips of your cane or walker to make sure they have nonskid covers.  · Move slowly from room to room. Don't rush.  · Sit down to get dressed.  · Use a bubba pack or backpack to keep your hands free.  · Get help for jobs that mean climbing, even on a stepstool.  · Do not try going up or down stairs using a walker.   Date Last Reviewed: 6/12/2015  © 7463-1334 [employer]. [address] [place] [address], [place] [address]. All rights reserved. This information is not intended as a substitute for professional medical care. Always follow your healthcare professional's instructions.
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
## 2017-03-31 NOTE — PROGRESS NOTES
3/31/17 - Phone contact with pt and his wife today for completion of Nursing Assessment for OPCM. Pt answers the phone, but quickly asks CM to talk to his wife. She reports they have just completed appt with Dr Isabel and discussed that he will write order for a motorized scooter for pt [facility] per her [facility], along with a ramp. She reports they have already discussed pt's need for these items with his [facility] provider and have been told they will be covered by the [facility]. They are now waiting to hear back from them about what they need to do next. She requested that CM wait a few weeks before calling back to give them time to hear back from the [facility]. She reports she currently is helping pt with all needs, including dressing, bathing, cooking, home maintenance and med administration. He has a poor appetite, but she gives Ensure supplements when he does not want to eat. CM will mail educational literature related to home safety and fall precautions and will follow up in two weeks. Елена Ruvalcaba RN

## 2017-04-06 RX ORDER — SPIRONOLACTONE 50 MG/1
TABLET, FILM COATED ORAL
Qty: 90 TABLET | Refills: 0 | Status: SHIPPED | OUTPATIENT
Start: 2017-04-06 | End: 2017-07-05 | Stop reason: SDUPTHER

## 2017-04-16 RX ORDER — GABAPENTIN 600 MG/1
TABLET ORAL
Qty: 270 TABLET | OUTPATIENT
Start: 2017-04-16

## 2017-04-18 ENCOUNTER — TELEPHONE (OUTPATIENT)
Dept: HEMATOLOGY/ONCOLOGY | Facility: CLINIC | Age: 82
End: 2017-04-18

## 2017-04-18 NOTE — TELEPHONE ENCOUNTER
Patient was to have a tooth extraction today but he did not stop his Plavix.  I advised wife that no one would clear him for a tooth extraction without him being off his Plavix

## 2017-04-18 NOTE — TELEPHONE ENCOUNTER
----- Message from Vaibhav Belcher sent at 4/18/2017  9:31 AM CDT -----  Contact: pt wife/ Елена  Pt is calling Nurse staff regarding a clearance for the Dentist today. Patient appt is for 12:30 today. Patient stated that they will give the dentist Fax number when staff calls patient back because, patient need to speak with staff. Pt call back to be advised asap [phone] thanks

## 2017-04-19 ENCOUNTER — TELEPHONE (OUTPATIENT)
Dept: HEMATOLOGY/ONCOLOGY | Facility: CLINIC | Age: 82
End: 2017-04-19

## 2017-04-19 ENCOUNTER — TELEPHONE (OUTPATIENT)
Dept: CARDIOLOGY | Facility: CLINIC | Age: 82
End: 2017-04-19

## 2017-04-19 NOTE — TELEPHONE ENCOUNTER
Telephoned Mrs. Roe regarding scheduling/confirming appointment with Dr. Roman on Friday.  Stated that he needs Dental clearance for extractions and other dental work .  Last Cardiology visit 2013 with Ken but requesting Dr. Roman last visit 2010

## 2017-04-19 NOTE — TELEPHONE ENCOUNTER
----- Message from Genesis Mcleod MA sent at 4/19/2017  1:55 PM CDT -----  Patient is requesting an appointment with cardiology before 4/26/17.  He has congestive heart failure and needs a tooth extracted. He has seen Dr Roman in the past.    Thanks for your help  Kenisha Isabel's office

## 2017-04-19 NOTE — TELEPHONE ENCOUNTER
----- Message from Spring Maloney sent at 4/19/2017 12:41 PM CDT -----  Contact: wife  Pt wife would like nurse Kenisha to give her a call [phone]

## 2017-04-19 NOTE — TELEPHONE ENCOUNTER
----- Message from Spring Maloney sent at 4/19/2017 12:31 PM CDT -----  Contact: spouse  Pt spouse would like the pt to see Dr Roman states seen before, per wife has to seen regarding pt heart also clearance for dental work...[phone] or [phone] or [phone]

## 2017-04-20 DIAGNOSIS — R05.9 COUGH: ICD-10-CM

## 2017-04-20 RX ORDER — PROMETHAZINE HYDROCHLORIDE AND DEXTROMETHORPHAN HYDROBROMIDE 6.25; 15 MG/5ML; MG/5ML
SYRUP ORAL
Qty: 240 ML | Refills: 5 | Status: SHIPPED | OUTPATIENT
Start: 2017-04-20 | End: 2017-04-21 | Stop reason: SDUPTHER

## 2017-04-21 ENCOUNTER — OFFICE VISIT (OUTPATIENT)
Dept: CARDIOLOGY | Facility: CLINIC | Age: 82
End: 2017-04-21
Payer: MEDICARE

## 2017-04-21 VITALS
BODY MASS INDEX: 31.52 KG/M2 | WEIGHT: 200.81 LBS | DIASTOLIC BLOOD PRESSURE: 62 MMHG | HEART RATE: 96 BPM | SYSTOLIC BLOOD PRESSURE: 122 MMHG | HEIGHT: 67 IN

## 2017-04-21 DIAGNOSIS — Z98.890 S/P CAROTID ENDARTERECTOMY: ICD-10-CM

## 2017-04-21 DIAGNOSIS — C90.00 MULTIPLE MYELOMA NOT HAVING ACHIEVED REMISSION: ICD-10-CM

## 2017-04-21 DIAGNOSIS — R60.0 EDEMA OF LOWER EXTREMITY, UNSPECIFIED LATERALITY: ICD-10-CM

## 2017-04-21 DIAGNOSIS — I73.9 PERIPHERAL VASCULAR DISEASE: ICD-10-CM

## 2017-04-21 DIAGNOSIS — I65.23 BILATERAL CAROTID ARTERY STENOSIS: ICD-10-CM

## 2017-04-21 DIAGNOSIS — J44.9 CHRONIC OBSTRUCTIVE PULMONARY DISEASE, UNSPECIFIED COPD TYPE: ICD-10-CM

## 2017-04-21 DIAGNOSIS — I25.10 CHRONIC CORONARY ARTERY DISEASE: Chronic | ICD-10-CM

## 2017-04-21 DIAGNOSIS — L89.814: ICD-10-CM

## 2017-04-21 DIAGNOSIS — Z95.1 S/P CABG (CORONARY ARTERY BYPASS GRAFT): ICD-10-CM

## 2017-04-21 DIAGNOSIS — I35.0 NONRHEUMATIC AORTIC VALVE STENOSIS: ICD-10-CM

## 2017-04-21 DIAGNOSIS — I50.33 ACUTE ON CHRONIC DIASTOLIC CONGESTIVE HEART FAILURE: ICD-10-CM

## 2017-04-21 DIAGNOSIS — I50.32 CHRONIC DIASTOLIC HEART FAILURE: ICD-10-CM

## 2017-04-21 DIAGNOSIS — Z01.818 PREOPERATIVE CLEARANCE: Primary | ICD-10-CM

## 2017-04-21 PROCEDURE — 1157F ADVNC CARE PLAN IN RCRD: CPT | Mod: S$GLB,,, | Performed by: INTERNAL MEDICINE

## 2017-04-21 PROCEDURE — 1160F RVW MEDS BY RX/DR IN RCRD: CPT | Mod: S$GLB,,, | Performed by: INTERNAL MEDICINE

## 2017-04-21 PROCEDURE — 3078F DIAST BP <80 MM HG: CPT | Mod: S$GLB,,, | Performed by: INTERNAL MEDICINE

## 2017-04-21 PROCEDURE — 99999 PR PBB SHADOW E&M-EST. PATIENT-LVL III: CPT | Mod: PBBFAC,,, | Performed by: INTERNAL MEDICINE

## 2017-04-21 PROCEDURE — 99499 UNLISTED E&M SERVICE: CPT | Mod: S$GLB,,, | Performed by: INTERNAL MEDICINE

## 2017-04-21 PROCEDURE — 3074F SYST BP LT 130 MM HG: CPT | Mod: S$GLB,,, | Performed by: INTERNAL MEDICINE

## 2017-04-21 PROCEDURE — 99214 OFFICE O/P EST MOD 30 MIN: CPT | Mod: S$GLB,,, | Performed by: INTERNAL MEDICINE

## 2017-04-21 PROCEDURE — 1159F MED LIST DOCD IN RCRD: CPT | Mod: S$GLB,,, | Performed by: INTERNAL MEDICINE

## 2017-04-21 RX ORDER — ATENOLOL 50 MG/1
50 TABLET ORAL DAILY
COMMUNITY

## 2017-04-21 NOTE — MR AVS SNAPSHOT
[facility]  [address] [facility] [place] [address]  Phone: [phone]  Fax: [phone]                  Lorne Roe   2017 8:45 AM   Office Visit    Description:  Male : 1935   Provider:  Mauro Roman MD   Department:  [facility]           Reason for Visit     Hypertension     Hyperlipidemia           Diagnoses this Visit        Comments    Preoperative clearance    -  Primary     Chronic diastolic heart failure         Chronic obstructive pulmonary disease, unspecified COPD type         Multiple myeloma not having achieved remission         Nonrheumatic aortic valve stenosis         Decubitus ulcer of head, stage 4         Edema of lower extremity, unspecified laterality         Acute on chronic diastolic congestive heart failure         Obesity, Class II, BMI 35-39.9, with comorbidity         Peripheral vascular disease         Chronic coronary artery disease         S/P CABG (coronary artery bypass graft)         Bilateral carotid artery stenosis         S/P carotid endarterectomy                To Do List           Future Appointments        Provider Department Dept Phone    2017 10:30 AM LABORATORY, [facility] [phone]    2017 11:00 AM Zheng Isabel MD [facility] - Hemotology Oncology [phone]    2017 11:30 AM CHAIR 03 [facility] [phone]    2017 10:00 AM [facility] XR2 [facility] [phone]    2017 10:20 AM Shayan Szymanski MD [facility] - Pulmonary Services [phone]      Goals (5 Years of Data)     Patient/caregiver will have an action plan in place to manage and prevent complications of weakness and fall risk prior to discharge from OPCM.     Notes - Note created  3/31/2017  1:57 PM by Елена Ruvalcaba RN    Overall Time to Completion  2 months from 2017    Short Term Goals  Patient/caregiver will discuss health care needs with Physician and care team during  visits or using Patient Portal.  Interventions   · Collaborate with Physician as appropriate to meet patient needs.  · Empower patient/caregiver to discuss treatment plan with Physician/care team.   Status  · Partially met    Patient/caregiver will put in place 2-3 keeping room free of clutter, making sure no electrical cords placed in walk ways, making sure rooms are well lit, placing non-skid bath mats and removing throw rugs measures to decrease the risk of patient falls within 2 weeks.  Interventions   · Recognize and provide educational material (JAGRUTI).  · Facilitate to needed DME.   Status  · Partially met              [facility]     [facility] Nurse Care Line - 24/7 Assistance  Unless otherwise directed by your provider, please contact [facility], our nurse care line that is available for 24/7 assistance.     Registered nurses in the [facility] provide: appointment scheduling, clinical advisement, health education, and other advisory services.  Call: [phone] (toll free)               Medications           Message regarding Medications     Verify the changes and/or additions to your medication regime listed below are the same as discussed with your clinician today.  If any of these changes or additions are incorrect, please notify your healthcare provider.             Verify that the below list of medications is an accurate representation of the medications you are currently taking.  If none reported, the list may be blank. If incorrect, please contact your healthcare provider. Carry this list with you in case of emergency.           Current Medications     albuterol 90 mcg/actuation inhaler Inhale 2 puffs into the lungs every 6 (six) hours as needed for Shortness of Breath.    albuterol-ipratropium 2.5mg-0.5mg/3mL (DUO-NEB) 0.5 mg-3 mg(2.5 mg base)/3 mL nebulizer solution Take 3 mLs by nebulization every 6 (six) hours while awake.    ammonium lactate (AMLACTIN) 12 % lotion  Use daily.  Apply to damp skin after bathing.    aspirin (ECOTRIN) 81 MG EC tablet Take 1 tablet (81 mg total) by mouth once daily.    atenolol (TENORMIN) 50 MG tablet Take 50 mg by mouth once daily.    buPROPion (WELLBUTRIN) 75 MG tablet Take 1 tablet (75 mg total) by mouth once daily.    clonazePAM (KLONOPIN) 0.5 MG tablet Take 1 tablet (0.5 mg total) by mouth once daily.    clopidogrel (PLAVIX) 75 mg tablet Take 1 tablet (75 mg total) by mouth once daily.    clotrimazole (LOTRIMIN) 1 % cream Apply topically as needed.    cyanocobalamin 1,000 mcg/mL injection Inject 1 mL (1,000 mcg total) into the muscle every 30 days.    dexamethasone (DECADRON) 4 MG Tab 5 tablets po bid one day a week every week    docusate sodium (COLACE) 100 MG capsule Take 1 capsule (100 mg total) by mouth 2 (two) times daily.    ergocalciferol (VITAMIN D2) 50,000 unit Cap TAKE 1 CAPSULE EVERY 7 DAYS    fluticasone (FLONASE) 50 mcg/actuation nasal spray 1 spray by Each Nare route once daily.    fluticasone-salmeterol 250-50 mcg/dose (ADVAIR DISKUS) 250-50 mcg/dose diskus inhaler Inhale 1 puff into the lungs 2 (two) times daily.    furosemide (LASIX) 40 MG tablet Take 1 tablet (40 mg total) by mouth 2 (two) times daily.    lenalidomide (REVLIMID) 10 mg Cap One tablet daily from day 1 to day 21 then 7 days off    levoFLOXacin (LEVAQUIN) 500 MG tablet Take 1 tablet (500 mg total) by mouth once daily.    levothyroxine (SYNTHROID) 25 MCG tablet Take 1 tablet (25 mcg total) by mouth once daily.    magnesium oxide (MAG-OX) 400 mg tablet Take 1 tablet (400 mg total) by mouth 2 (two) times daily.    meloxicam (MOBIC) 7.5 MG tablet Take 7.5 mg by mouth.    midodrine (PROAMATINE) 2.5 MG Tab Take 2 tablets (5 mg total) by mouth 2 (two) times daily with meals.    ondansetron (ZOFRAN) 4 MG tablet Take 1 tablet (4 mg total) by mouth every 8 (eight) hours as needed for Nausea.    oxycodone-acetaminophen (PERCOCET)  mg per tablet Take 1 tablet by mouth  "every 6 (six) hours as needed for Pain.    pantoprazole (PROTONIX) 40 MG tablet Take 1 tablet (40 mg total) by mouth once daily.    promethazine-dextromethorphan (PROMETHAZINE-DM) 6.25-15 mg/5 mL Syrp Take by mouth.    rosuvastatin (CRESTOR) 10 MG tablet Take 1 tablet (10 mg total) by mouth every evening.    spironolactone (ALDACTONE) 50 MG tablet TAKE 1 TABLET ONCE DAILY    tamsulosin (FLOMAX) 0.4 mg Cp24 Take 1 capsule (0.4 mg total) by mouth once daily.    acyclovir (ZOVIRAX) 400 MG tablet Take 1 tablet (400 mg total) by mouth 2 (two) times daily.    nitroGLYCERIN (NITROSTAT) 0.4 MG SL tablet Place 1 tablet (0.4 mg total) under the tongue every 5 (five) minutes as needed.    nystatin (MYCOSTATIN) cream Apply topically 2 (two) times daily.           Clinical Reference Information           Your Vitals Were     BP Pulse Height Weight BMI    122/62 96 5' 7" (1.702 m) 91.1 kg (200 lb 13.4 oz) 31.46 kg/m2      Blood Pressure          Most Recent Value    Right Arm BP - Sitting  122/62    Left Arm BP - Sitting  130/62    BP  122/62      Allergies as of 4/21/2017     Adhesive    Benzalkonium Chloride    Cephalexin    Gramicidin D    Neomycin-bacitracin-polymyxin      Immunizations Administered on Date of Encounter - 4/21/2017     None      [facility] Sign-Up     Activating your [facility] account is as easy as 1-2-3!     1) Visit [email or web address], select Sign Up Now, enter this activation code and your date of birth, then select Next.  [email or web address]  Expires: 5/1/2017 11:37 AM      2) Create a username and password to use when you visit [facility] in the future and select a security question in case you lose your password and select Next.    3) Enter your e-mail address and click Sign Up!    Additional Information  If you have questions, please e-mail [email or web address] or call [phone] to talk to our [facility] staff. Remember, [facility] is NOT to be used for urgent needs. For medical emergencies, dial 911.       "   Smoking Cessation     If you would like to quit smoking:   You may be eligible for free services if you are a [place] resident and started smoking cigarettes before September 1, 1988.  Call the Smoking Cessation Trust (SCT) toll free at (374) 132-8549 or (983) 070-2920.   Call 1-800-QUIT-NOW if you do not meet the above criteria.   Contact us via email: [email or web address]   View our website for more information: [email or web address]        Language Assistance Services     ATTENTION: Language assistance services are available, free of charge. Please call [phone].      ATENCIÓN: Si habla español, tiene a yates disposición servicios gratuitos de asistencia lingüística. Llame al [phone].     CHÚ Ý: N?u b?n nói Ti?ng Vi?t, có các d?ch v? h? tr? ngôn ng? mi?n phí dành cho b?n. G?i s? [phone].         [facility] complies with applicable Federal civil rights laws and does not discriminate on the basis of race, color, national origin, age, disability, or sex.

## 2017-04-21 NOTE — LETTER
April 21, 2017      Randi Bush MD  [address] [facility] [place] [address]           [facility]  [address] [facility] [place] [address]  Phone: [phone]  Fax: [phone]          Patient: Lorne Roe   MR Number: [id number]   YOB: 1935   Date of Visit: 4/21/2017       Dear Dr. Randi Bush:    Thank you for referring Lorne Roe to me for evaluation. Attached you will find relevant portions of my assessment and plan of care.    If you have questions, please do not hesitate to call me. I look forward to following Lorne Roe along with you.    Sincerely,    Mauro Roman MD    Enclosure  CC:  No Recipients    If you would like to receive this communication electronically, please contact [email or web address] or [phone] to request more information on [employer] Link access.    For providers and/or their staff who would like to refer a patient to [facility], please contact us through our one-stop-shop provider referral line, [facility], at [phone].    If you feel you have received this communication in error or would no longer like to receive these types of communications, please e-mail [email or web address]

## 2017-04-21 NOTE — PROGRESS NOTES
Subjective:   Patient ID:  Lorne Roe is a 82 y.o. male who presents for evaluation of Hypertension (dental clearance) and Hyperlipidemia      HPI  A 81 yo male with h/o copd diastolic chf cad s/p cabg severe pvd  S/p carotid endarterectomy multiple myeloma is here for preop evaluation of dental extraction . He has been havuing issues with leg edema that [facility] now is on maintenance diuretics. He walks with a walker he is very limited he is on o2 therapy . He has no issues with chest pain. He gets physical therapy he is limited with shortness of breath significantly due to his copd.he has moderate aortic stenosis. He has no orthopnea or pnd. He has chronic pain.he has numbness in his legs and feet.he follows with DR HELTON FOR HIS PVD AND CAROTID DISEASE. HE NEEDS TOOTH EXTRACTION HE IS HERE FOR PREOP EVAL.  Past Medical History:   Diagnosis Date    Acute coronary syndrome     B12 deficiency anemia     Carotid artery occlusion     CHF (congestive heart failure)     COPD (chronic obstructive pulmonary disease)     Coronary artery disease     s/p cabg[facility]ents [facility]    Depression     Hyperlipidemia     Hypertension     Multiple myeloma     Osteopenia     Paraproteinemia     Dr alvarado    PVD (peripheral vascular disease)     Dr Argueta    Sleep apnea     Vitamin D deficiency disease        Past Surgical History:   Procedure Laterality Date    BACK SURGERY      CAROTID ENDARTERECTOMY Left 12/17/13    left    CORONARY ANGIOPLASTY WITH STENT PLACEMENT      CORONARY ARTERY BYPASS GRAFT      stent/eren multi-link duet-safe to 1.5T    NECK SURGERY         Social History   Substance Use Topics    Smoking status: Former Smoker    Smokeless tobacco: Never Used    Alcohol use No       Family History   Problem Relation Age of Onset    Heart disease Father     Melanoma Neg Hx     Psoriasis Neg Hx     Lupus Neg Hx     Eczema Neg Hx         Current Outpatient Prescriptions   Medication Sig    albuterol 90 mcg/actuation inhaler Inhale 2 puffs into the lungs every 6 (six) hours as needed for Shortness of Breath.    albuterol-ipratropium 2.5mg-0.5mg/3mL (DUO-NEB) 0.5 mg-3 mg(2.5 mg base)/3 mL nebulizer solution Take 3 mLs by nebulization every 6 (six) hours while awake.    ammonium lactate (AMLACTIN) 12 % lotion Use daily.  Apply to damp skin after bathing.    aspirin (ECOTRIN) 81 MG EC tablet Take 1 tablet (81 mg total) by mouth once daily.    atenolol (TENORMIN) 50 MG tablet Take 50 mg by mouth once daily.    buPROPion (WELLBUTRIN) 75 MG tablet Take 1 tablet (75 mg total) by mouth once daily.    clonazePAM (KLONOPIN) 0.5 MG tablet Take 1 tablet (0.5 mg total) by mouth once daily.    clopidogrel (PLAVIX) 75 mg tablet Take 1 tablet (75 mg total) by mouth once daily.    clotrimazole (LOTRIMIN) 1 % cream Apply topically as needed.    cyanocobalamin 1,000 mcg/mL injection Inject 1 mL (1,000 mcg total) into the muscle every 30 days.    dexamethasone (DECADRON) 4 MG Tab 5 tablets po bid one day a week every week    docusate sodium (COLACE) 100 MG capsule Take 1 capsule (100 mg total) by mouth 2 (two) times daily.    ergocalciferol (VITAMIN D2) 50,000 unit Cap TAKE 1 CAPSULE EVERY 7 DAYS    fluticasone (FLONASE) 50 mcg/actuation nasal spray 1 spray by Each Nare route once daily.    fluticasone-salmeterol 250-50 mcg/dose (ADVAIR DISKUS) 250-50 mcg/dose diskus inhaler Inhale 1 puff into the lungs 2 (two) times daily.    furosemide (LASIX) 40 MG tablet Take 1 tablet (40 mg total) by mouth 2 (two) times daily.    lenalidomide (REVLIMID) 10 mg Cap One tablet daily from day 1 to day 21 then 7 days off    levoFLOXacin (LEVAQUIN) 500 MG tablet Take 1 tablet (500 mg total) by mouth once daily.    levothyroxine (SYNTHROID) 25 MCG tablet Take 1 tablet (25 mcg total) by mouth once daily.    magnesium oxide (MAG-OX) 400 mg tablet Take 1 tablet (400  mg total) by mouth 2 (two) times daily.    meloxicam (MOBIC) 7.5 MG tablet Take 7.5 mg by mouth.    midodrine (PROAMATINE) 2.5 MG Tab Take 2 tablets (5 mg total) by mouth 2 (two) times daily with meals.    ondansetron (ZOFRAN) 4 MG tablet Take 1 tablet (4 mg total) by mouth every 8 (eight) hours as needed for Nausea.    oxycodone-acetaminophen (PERCOCET)  mg per tablet Take 1 tablet by mouth every 6 (six) hours as needed for Pain.    pantoprazole (PROTONIX) 40 MG tablet Take 1 tablet (40 mg total) by mouth once daily.    promethazine-dextromethorphan (PROMETHAZINE-DM) 6.25-15 mg/5 mL Syrp Take by mouth.    rosuvastatin (CRESTOR) 10 MG tablet Take 1 tablet (10 mg total) by mouth every evening.    spironolactone (ALDACTONE) 50 MG tablet TAKE 1 TABLET ONCE DAILY    tamsulosin (FLOMAX) 0.4 mg Cp24 Take 1 capsule (0.4 mg total) by mouth once daily.    acyclovir (ZOVIRAX) 400 MG tablet Take 1 tablet (400 mg total) by mouth 2 (two) times daily.    nitroGLYCERIN (NITROSTAT) 0.4 MG SL tablet Place 1 tablet (0.4 mg total) under the tongue every 5 (five) minutes as needed.    nystatin (MYCOSTATIN) cream Apply topically 2 (two) times daily.     No current facility-administered medications for this visit.      Current Outpatient Prescriptions on File Prior to Visit   Medication Sig    albuterol 90 mcg/actuation inhaler Inhale 2 puffs into the lungs every 6 (six) hours as needed for Shortness of Breath.    albuterol-ipratropium 2.5mg-0.5mg/3mL (DUO-NEB) 0.5 mg-3 mg(2.5 mg base)/3 mL nebulizer solution Take 3 mLs by nebulization every 6 (six) hours while awake.    ammonium lactate (AMLACTIN) 12 % lotion Use daily.  Apply to damp skin after bathing.    aspirin (ECOTRIN) 81 MG EC tablet Take 1 tablet (81 mg total) by mouth once daily.    buPROPion (WELLBUTRIN) 75 MG tablet Take 1 tablet (75 mg total) by mouth once daily.    clonazePAM (KLONOPIN) 0.5 MG tablet Take 1 tablet (0.5 mg total) by mouth once daily.     clopidogrel (PLAVIX) 75 mg tablet Take 1 tablet (75 mg total) by mouth once daily.    clotrimazole (LOTRIMIN) 1 % cream Apply topically as needed.    cyanocobalamin 1,000 mcg/mL injection Inject 1 mL (1,000 mcg total) into the muscle every 30 days.    dexamethasone (DECADRON) 4 MG Tab 5 tablets po bid one day a week every week    docusate sodium (COLACE) 100 MG capsule Take 1 capsule (100 mg total) by mouth 2 (two) times daily.    ergocalciferol (VITAMIN D2) 50,000 unit Cap TAKE 1 CAPSULE EVERY 7 DAYS    fluticasone (FLONASE) 50 mcg/actuation nasal spray 1 spray by Each Nare route once daily.    fluticasone-salmeterol 250-50 mcg/dose (ADVAIR DISKUS) 250-50 mcg/dose diskus inhaler Inhale 1 puff into the lungs 2 (two) times daily.    furosemide (LASIX) 40 MG tablet Take 1 tablet (40 mg total) by mouth 2 (two) times daily.    lenalidomide (REVLIMID) 10 mg Cap One tablet daily from day 1 to day 21 then 7 days off    levoFLOXacin (LEVAQUIN) 500 MG tablet Take 1 tablet (500 mg total) by mouth once daily.    levothyroxine (SYNTHROID) 25 MCG tablet Take 1 tablet (25 mcg total) by mouth once daily.    magnesium oxide (MAG-OX) 400 mg tablet Take 1 tablet (400 mg total) by mouth 2 (two) times daily.    meloxicam (MOBIC) 7.5 MG tablet Take 7.5 mg by mouth.    midodrine (PROAMATINE) 2.5 MG Tab Take 2 tablets (5 mg total) by mouth 2 (two) times daily with meals.    ondansetron (ZOFRAN) 4 MG tablet Take 1 tablet (4 mg total) by mouth every 8 (eight) hours as needed for Nausea.    oxycodone-acetaminophen (PERCOCET)  mg per tablet Take 1 tablet by mouth every 6 (six) hours as needed for Pain.    pantoprazole (PROTONIX) 40 MG tablet Take 1 tablet (40 mg total) by mouth once daily.    promethazine-dextromethorphan (PROMETHAZINE-DM) 6.25-15 mg/5 mL Syrp Take by mouth.    rosuvastatin (CRESTOR) 10 MG tablet Take 1 tablet (10 mg total) by mouth every evening.    spironolactone (ALDACTONE) 50 MG tablet TAKE 1  TABLET ONCE DAILY    tamsulosin (FLOMAX) 0.4 mg Cp24 Take 1 capsule (0.4 mg total) by mouth once daily.    acyclovir (ZOVIRAX) 400 MG tablet Take 1 tablet (400 mg total) by mouth 2 (two) times daily.    nitroGLYCERIN (NITROSTAT) 0.4 MG SL tablet Place 1 tablet (0.4 mg total) under the tongue every 5 (five) minutes as needed.    nystatin (MYCOSTATIN) cream Apply topically 2 (two) times daily.    [DISCONTINUED] atenolol (TENORMIN) 25 MG tablet Take 0.5 tablets (12.5 mg total) by mouth once daily. (Patient taking differently: Take 50 mg by mouth once daily. )    [DISCONTINUED] promethazine-dextromethorphan (PROMETHAZINE-DM) 6.25-15 mg/5 mL Syrp TAKE 1 TEASPOONFUL BY MOUTH EVERY 6 HOURS AS NEEDED FOR COUGH     No current facility-administered medications on file prior to visit.        Review of patient's allergies indicates:   Allergen Reactions    Adhesive     Benzalkonium chloride     Cephalexin      Other reaction(s): Diarrhea    Gramicidin d     Neomycin-bacitracin-polymyxin      Other reaction(s): Unknown       Review of Systems   Constitution: Positive for weakness and malaise/fatigue.   HENT: Negative for headaches.    Eyes: Negative for blurred vision.   Cardiovascular: Positive for dyspnea on exertion and leg swelling. Negative for chest pain, claudication, cyanosis, irregular heartbeat, near-syncope, orthopnea, palpitations and paroxysmal nocturnal dyspnea.   Respiratory: Positive for shortness of breath. Negative for cough and hemoptysis.    Hematologic/Lymphatic: Negative for bleeding problem. Bruises/bleeds easily.   Skin: Negative for dry skin and itching.   Musculoskeletal: Positive for back pain and joint pain. Negative for falls, muscle weakness and myalgias.   Gastrointestinal: Negative for abdominal pain, diarrhea, heartburn, hematemesis, hematochezia and melena.   Genitourinary: Negative for flank pain and hematuria.   Neurological: Positive for dizziness, light-headedness, numbness and  paresthesias. Negative for focal weakness and seizures.   Psychiatric/Behavioral: Negative for altered mental status and memory loss. The patient is not nervous/anxious.    Allergic/Immunologic: Negative for hives.       Objective:   Physical Exam   Constitutional: He is oriented to person, place, and time. He appears well-developed and well-nourished. No distress.   HENT:   Head: Normocephalic and atraumatic.   Eyes: EOM are normal. Pupils are equal, round, and reactive to light. Right eye exhibits no discharge. Left eye exhibits no discharge.   Neck: Neck supple. No JVD present. No thyromegaly present.   Cardiovascular: Normal rate, regular rhythm and intact distal pulses.  Exam reveals decreased pulses. Exam reveals no gallop and no friction rub.    Murmur heard.   Harsh midsystolic murmur is present with a grade of 2/6  at the upper right sternal border radiating to the neck Has doppler monophasic signal in both feet.  Pulses:       Carotid pulses are 2+ on the right side with bruit, and 2+ on the left side with bruit.       Radial pulses are 2+ on the right side, and 2+ on the left side.        Femoral pulses are 2+ on the right side with bruit, and 2+ on the left side with bruit.       Dorsalis pedis pulses are 0 on the right side, and 0 on the left side.        Posterior tibial pulses are 0 on the right side, and 0 on the left side.   Pulmonary/Chest: Effort normal and breath sounds normal. No respiratory distress. He has no wheezes. He has no rales. He exhibits no tenderness.   Abdominal: Soft. Bowel sounds are normal. He exhibits no distension. There is no tenderness.   Musculoskeletal: Normal range of motion. He exhibits no edema.   Neurological: He is alert and oriented to person, place, and time. No cranial nerve deficit.   Skin: Skin is warm and dry. No rash noted. He is not diaphoretic. No erythema.   Chronic venous hyperpigmentation.in both legs.   Psychiatric: He has a normal mood and affect. His  "behavior is normal.   Nursing note and vitals reviewed.    Vitals:    04/21/17 0908   BP: 122/62   Pulse: 96   Weight: 91.1 kg (200 lb 13.4 oz)   Height: 5' 7" (1.702 m)     Lab Results   Component Value Date    CHOL 117 (L) 05/25/2016    CHOL 126 01/28/2016    CHOL 104 (L) 09/17/2015     Lab Results   Component Value Date    HDL 27 (L) 05/25/2016    HDL 40 01/28/2016    HDL 28 (L) 09/17/2015     Lab Results   Component Value Date    LDLCALC 54.6 (L) 05/25/2016    LDLCALC 68.4 01/28/2016    LDLCALC 34.8 (L) 09/17/2015     Lab Results   Component Value Date    TRIG 177 (H) 05/25/2016    TRIG 88 01/28/2016    TRIG 206 (H) 09/17/2015     Lab Results   Component Value Date    CHOLHDL 23.1 05/25/2016    CHOLHDL 31.7 01/28/2016    CHOLHDL 26.9 09/17/2015       Chemistry        Component Value Date/Time     (L) 03/31/2017 1103    K 3.9 03/31/2017 1103    CL 92 (L) 03/31/2017 1103    CO2 31 (H) 03/31/2017 1103    BUN 21 03/31/2017 1103    CREATININE 1.3 03/31/2017 1103     (H) 03/31/2017 1103        Component Value Date/Time    CALCIUM 9.4 03/31/2017 1103    ALKPHOS 58 03/31/2017 1103    AST 10 03/31/2017 1103    ALT 19 03/31/2017 1103    BILITOT 0.7 03/31/2017 1103          Lab Results   Component Value Date    TSH 2.362 11/24/2016     Lab Results   Component Value Date    INR 1.8 (H) 03/17/2017    INR 1.1 11/24/2016    INR 1.1 11/14/2010     Lab Results   Component Value Date    WBC 7.95 03/31/2017    HGB 11.5 (L) 03/31/2017    HCT 36.8 (L) 03/31/2017    MCV 94 03/31/2017     03/31/2017     BNP  @LABRCNTIP(BNP,BNPTRIAGEBLO)@  CrCl cannot be calculated (Patient has no serum creatinine result on file.).  Assessment:     1. Preoperative clearance    2. Chronic diastolic heart failure    3. Chronic obstructive pulmonary disease, unspecified COPD type    4. Multiple myeloma not having achieved remission    5. Nonrheumatic aortic valve stenosis    6. Decubitus ulcer of head, stage 4    7. Edema of lower " extremity, unspecified laterality    8. Acute on chronic diastolic congestive heart failure    9. Obesity, Class II, BMI 35-39.9, with comorbidity    10. Peripheral vascular disease    11. Chronic coronary artery disease    12. S/P CABG (coronary artery bypass graft)    13. Bilateral carotid artery stenosis    14. S/P carotid endarterectomy      QUITE COMPLEX PATIENT WITH MULTIPLE ISSUES. HE IS OFF HIS PLAVIX AND ASA FOR DENTAL WORK. HE NEEDS EXTRACTION AND IS AT MODERATE  RISK FOR COMPLICATION CARDIAC WISE. DUE TO HIS AORTIC STENOSIS WILL SUGGEST ANTIBIOTICS PRETREATMENT.  Plan:   OK FOR DENTAL EXTRACTION MODERATE CARDIAC RISK SUGGEST SBE PROPHYLAXIS.

## 2017-04-23 DIAGNOSIS — F41.9 ANXIETY AND DEPRESSION: ICD-10-CM

## 2017-04-23 DIAGNOSIS — F32.A ANXIETY AND DEPRESSION: ICD-10-CM

## 2017-04-23 RX ORDER — BUPROPION HYDROCHLORIDE 75 MG/1
TABLET ORAL
Qty: 90 TABLET | Refills: 2 | OUTPATIENT
Start: 2017-04-23

## 2017-04-24 ENCOUNTER — TELEPHONE (OUTPATIENT)
Dept: HEMATOLOGY/ONCOLOGY | Facility: CLINIC | Age: 82
End: 2017-04-24

## 2017-04-24 ENCOUNTER — OUTPATIENT CASE MANAGEMENT (OUTPATIENT)
Dept: ADMINISTRATIVE | Facility: OTHER | Age: 82
End: 2017-04-24

## 2017-04-24 NOTE — PROGRESS NOTES
4/24/17 - Attempted phone contact with pt/wife with no answer. Voice mail left requesting return call. Will attempt to follow up at a later time. Елена Ruvalcaba RN

## 2017-04-24 NOTE — TELEPHONE ENCOUNTER
----- Message from Michell Fisher sent at 4/24/2017 12:36 PM CDT -----  Contact: pt  Pt need a refill on med and want to speak with the nurse.  ..[phone] (home)

## 2017-04-25 DIAGNOSIS — C90.00 MULTIPLE MYELOMA, REMISSION STATUS UNSPECIFIED: ICD-10-CM

## 2017-04-25 RX ORDER — DEXAMETHASONE 4 MG/1
TABLET ORAL
Qty: 40 TABLET | Refills: 6 | Status: SHIPPED | OUTPATIENT
Start: 2017-04-25 | End: 2017-05-30 | Stop reason: SDUPTHER

## 2017-04-25 NOTE — TELEPHONE ENCOUNTER
----- Message from Sandy Fisher sent at 4/25/2017 11:04 AM CDT -----  Contact: Елена/wife  Refill request for Rx Dexamethasone 4 mg, Oxycodone    [facility] - [place] - [address]  [address] [place] [address] Ave.  [address]  [place] [address]  Phone: [phone] Fax: [phone]    Thanks  td

## 2017-04-28 ENCOUNTER — OUTPATIENT CASE MANAGEMENT (OUTPATIENT)
Dept: ADMINISTRATIVE | Facility: OTHER | Age: 82
End: 2017-04-28

## 2017-04-28 NOTE — PROGRESS NOTES
4/28/17 - Attempted phone contact with pt and his wife with no answer. Voice mail left requesting return call. Will also mail letter requesting return call and will attempt to follow up at a later time. Елена Ruvalcaba RN

## 2017-04-28 NOTE — LETTER
April 28, 2017    Lorne Roe  [address]  [place] [address]             [facility] [address] [place] [address] Dear Mr and Mrs Roe,    I have tried to reach you a few times after we spoke on the phone on 3/31/17 to see if you received the literature I mailed out to you and if you have any questions about it.  Please call me at my office when you get this letter. My number is [phone].     Thank you,    Елена Ruvalcaba RN  Outpatient Case Management  [phone]  Ext [phone]  [email or web address]

## 2017-05-01 ENCOUNTER — INFUSION (OUTPATIENT)
Dept: INFUSION THERAPY | Facility: HOSPITAL | Age: 82
End: 2017-05-01
Attending: INTERNAL MEDICINE
Payer: MEDICARE

## 2017-05-01 ENCOUNTER — OFFICE VISIT (OUTPATIENT)
Dept: HEMATOLOGY/ONCOLOGY | Facility: CLINIC | Age: 82
End: 2017-05-01
Payer: MEDICARE

## 2017-05-01 ENCOUNTER — LAB VISIT (OUTPATIENT)
Dept: LAB | Facility: HOSPITAL | Age: 82
End: 2017-05-01
Attending: INTERNAL MEDICINE
Payer: MEDICARE

## 2017-05-01 VITALS
SYSTOLIC BLOOD PRESSURE: 96 MMHG | BODY MASS INDEX: 32.6 KG/M2 | RESPIRATION RATE: 28 BRPM | OXYGEN SATURATION: 91 % | DIASTOLIC BLOOD PRESSURE: 46 MMHG | HEIGHT: 67 IN | WEIGHT: 207.69 LBS | HEART RATE: 88 BPM | TEMPERATURE: 98 F

## 2017-05-01 DIAGNOSIS — C90.01 MULTIPLE MYELOMA IN REMISSION: Primary | ICD-10-CM

## 2017-05-01 DIAGNOSIS — C90.00 MULTIPLE MYELOMA NOT HAVING ACHIEVED REMISSION: ICD-10-CM

## 2017-05-01 DIAGNOSIS — D51.8 OTHER VITAMIN B12 DEFICIENCY ANEMIA: Primary | ICD-10-CM

## 2017-05-01 DIAGNOSIS — F41.9 ANXIETY: ICD-10-CM

## 2017-05-01 DIAGNOSIS — C90.00 MULTIPLE MYELOMA, REMISSION STATUS UNSPECIFIED: ICD-10-CM

## 2017-05-01 DIAGNOSIS — C90.01 MULTIPLE MYELOMA IN REMISSION: ICD-10-CM

## 2017-05-01 DIAGNOSIS — E83.42 HYPOMAGNESEMIA: ICD-10-CM

## 2017-05-01 LAB
ALBUMIN SERPL BCP-MCNC: 3.2 G/DL
ALP SERPL-CCNC: 61 U/L
ALT SERPL W/O P-5'-P-CCNC: 19 U/L
ANION GAP SERPL CALC-SCNC: 12 MMOL/L
AST SERPL-CCNC: 10 U/L
BASOPHILS # BLD AUTO: 0.01 K/UL
BASOPHILS NFR BLD: 0.2 %
BILIRUB SERPL-MCNC: 0.6 MG/DL
BUN SERPL-MCNC: 13 MG/DL
CALCIUM SERPL-MCNC: 9.7 MG/DL
CHLORIDE SERPL-SCNC: 100 MMOL/L
CO2 SERPL-SCNC: 26 MMOL/L
CREAT SERPL-MCNC: 1 MG/DL
DIFFERENTIAL METHOD: ABNORMAL
EOSINOPHIL # BLD AUTO: 0 K/UL
EOSINOPHIL NFR BLD: 0 %
ERYTHROCYTE [DISTWIDTH] IN BLOOD BY AUTOMATED COUNT: 19.1 %
EST. GFR  (AFRICAN AMERICAN): >60 ML/MIN/1.73 M^2
EST. GFR  (NON AFRICAN AMERICAN): >60 ML/MIN/1.73 M^2
GLUCOSE SERPL-MCNC: 194 MG/DL
HCT VFR BLD AUTO: 36.4 %
HGB BLD-MCNC: 11.3 G/DL
LYMPHOCYTES # BLD AUTO: 0.4 K/UL
LYMPHOCYTES NFR BLD: 7.9 %
MCH RBC QN AUTO: 29.4 PG
MCHC RBC AUTO-ENTMCNC: 31 %
MCV RBC AUTO: 95 FL
MONOCYTES # BLD AUTO: 0.4 K/UL
MONOCYTES NFR BLD: 6.9 %
NEUTROPHILS # BLD AUTO: 4.5 K/UL
NEUTROPHILS NFR BLD: 85 %
PLATELET # BLD AUTO: 138 K/UL
PMV BLD AUTO: 10.2 FL
POTASSIUM SERPL-SCNC: 4.6 MMOL/L
PROT SERPL-MCNC: 6.7 G/DL
RBC # BLD AUTO: 3.84 M/UL
SODIUM SERPL-SCNC: 138 MMOL/L
WBC # BLD AUTO: 5.33 K/UL

## 2017-05-01 PROCEDURE — 96372 THER/PROPH/DIAG INJ SC/IM: CPT | Mod: PO

## 2017-05-01 PROCEDURE — 1159F MED LIST DOCD IN RCRD: CPT | Mod: S$GLB,,, | Performed by: INTERNAL MEDICINE

## 2017-05-01 PROCEDURE — 99214 OFFICE O/P EST MOD 30 MIN: CPT | Mod: S$GLB,,, | Performed by: INTERNAL MEDICINE

## 2017-05-01 PROCEDURE — 36415 COLL VENOUS BLD VENIPUNCTURE: CPT | Mod: PO

## 2017-05-01 PROCEDURE — 1157F ADVNC CARE PLAN IN RCRD: CPT | Mod: S$GLB,,, | Performed by: INTERNAL MEDICINE

## 2017-05-01 PROCEDURE — 3074F SYST BP LT 130 MM HG: CPT | Mod: S$GLB,,, | Performed by: INTERNAL MEDICINE

## 2017-05-01 PROCEDURE — 99999 PR PBB SHADOW E&M-EST. PATIENT-LVL III: CPT | Mod: PBBFAC,,, | Performed by: INTERNAL MEDICINE

## 2017-05-01 PROCEDURE — 63600175 PHARM REV CODE 636 W HCPCS: Mod: PO | Performed by: INTERNAL MEDICINE

## 2017-05-01 PROCEDURE — 1125F AMNT PAIN NOTED PAIN PRSNT: CPT | Mod: S$GLB,,, | Performed by: INTERNAL MEDICINE

## 2017-05-01 PROCEDURE — 84165 PROTEIN E-PHORESIS SERUM: CPT

## 2017-05-01 PROCEDURE — 99499 UNLISTED E&M SERVICE: CPT | Mod: S$GLB,,, | Performed by: INTERNAL MEDICINE

## 2017-05-01 PROCEDURE — 84165 PROTEIN E-PHORESIS SERUM: CPT | Mod: 26,,, | Performed by: PATHOLOGY

## 2017-05-01 PROCEDURE — 80053 COMPREHEN METABOLIC PANEL: CPT | Mod: PO

## 2017-05-01 PROCEDURE — 3078F DIAST BP <80 MM HG: CPT | Mod: S$GLB,,, | Performed by: INTERNAL MEDICINE

## 2017-05-01 PROCEDURE — 85025 COMPLETE CBC W/AUTO DIFF WBC: CPT | Mod: PO

## 2017-05-01 RX ORDER — CYANOCOBALAMIN 1000 UG/ML
1000 INJECTION, SOLUTION INTRAMUSCULAR; SUBCUTANEOUS
Status: DISCONTINUED | OUTPATIENT
Start: 2017-05-01 | End: 2017-05-01 | Stop reason: HOSPADM

## 2017-05-01 RX ORDER — LANOLIN ALCOHOL/MO/W.PET/CERES
400 CREAM (GRAM) TOPICAL 2 TIMES DAILY
Qty: 60 TABLET | Refills: 2 | Status: SHIPPED | OUTPATIENT
Start: 2017-05-01

## 2017-05-01 RX ORDER — CYANOCOBALAMIN 1000 UG/ML
1000 INJECTION, SOLUTION INTRAMUSCULAR; SUBCUTANEOUS
Status: CANCELLED | OUTPATIENT
Start: 2017-06-01

## 2017-05-01 RX ORDER — OXYCODONE AND ACETAMINOPHEN 10; 325 MG/1; MG/1
1 TABLET ORAL EVERY 6 HOURS PRN
Qty: 60 TABLET | Refills: 0 | Status: SHIPPED | OUTPATIENT
Start: 2017-05-01 | End: 2017-05-30 | Stop reason: SDUPTHER

## 2017-05-01 RX ORDER — CLONAZEPAM 0.5 MG/1
0.5 TABLET ORAL DAILY
Qty: 60 TABLET | Refills: 0 | Status: SHIPPED | OUTPATIENT
Start: 2017-05-01 | End: 2017-09-19 | Stop reason: SDUPTHER

## 2017-05-01 RX ORDER — PROMETHAZINE HYDROCHLORIDE AND DEXTROMETHORPHAN HYDROBROMIDE 6.25; 15 MG/5ML; MG/5ML
5 SYRUP ORAL EVERY 6 HOURS PRN
Qty: 473 ML | Refills: 3 | Status: SHIPPED | OUTPATIENT
Start: 2017-05-01 | End: 2018-03-21 | Stop reason: SDUPTHER

## 2017-05-01 RX ORDER — NITROGLYCERIN 0.4 MG/1
0.4 TABLET SUBLINGUAL EVERY 5 MIN PRN
Qty: 30 TABLET | Refills: 3 | Status: SHIPPED | OUTPATIENT
Start: 2017-05-01

## 2017-05-01 RX ADMIN — CYANOCOBALAMIN 1000 MCG: 1000 INJECTION, SOLUTION INTRAMUSCULAR at 12:05

## 2017-05-01 NOTE — PROGRESS NOTES
Subjective:       Patient ID: Lorne Roe is a 82 y.o. male.    Chief Complaint: No chief complaint on file.    HPI  Review of Systems   Respiratory:        Chronic sob   Cardiovascular:        Occassional chest pains requiring NTG   Musculoskeletal:        Chronic back pain for which he takes pecocet     HPI This is a 81 year-old  gentleman who comes today for follow up of his smoldering myeloma.  He is who known to us since 11/2008 because of mild anemia. Work up at that time showed vitamin B12 in the lower limits of normal. He was given vitamin B12 supplements once a month with improvement. In addition, a serum protein electrophoresis was reported as positive showing an IgA monoclonal spike of 1.18 g. Eventually it sisi to 1.3 g. He had x-rays of the skull and other bones all of which were negative. His UPEP was negative for a monoclonal spike. His chem 20 as normal and has remained with normal calcium, protein and creatinine tthorugh the years. He had a bone marrow aspiration and biopsy on 05/03/2010 because of his increase in the spike of serum protein electrophoresis. 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    Serum free light chains in 07/2009 were reported as increased mostly at the expense of the lambda chains. There were measured at 7.13. We felt that based on the absence of lytic lesions, hypercalcemia, renal failure or significant anemia, he had at best a smoldering myeloma that we could follow expectantly. \He has chronic back pain of the spine    Imaging studies on 03/17/2011 ordered by orthopedics showed some disk disease and degenerative joint disease but no evidence of myeloma.    H A repeat MRI of the LS spine on 8/2014 showed DJD changes and no obvious evidence of myeloma.  Back pain continues about the same      He has chronic SOB for which he follows up with dr Szymanski and is on home 02.  His lab test in October 2016 show anemia of   10.8  calcium is normal, total Serum protein WAS 10.  SPEp showed a monoclonal spike of 4.82 gr with lambda free light chains at 108 hE had a small monoclonal pspike represetning 12% of the protein in the urine  He was asked to have a bone marrow. He was diagnosed as having active myeloma.> Biopsy was done at the [facility]. Report is in the MR MRdia section of EPIC.  Cytogenetics were ported as follows: 46XY, alonso(3), t(3;3), (P25, q21) , (15/46 XY (5)      He also had a positive T cell gene rearangement which raised the possibility of a concomitant T cell disorder. It was felt ,yeloma was the leading issue, and to treat him for it.  He just came out of the hospital after a prolonged series ofback to back admissions to the [facility], [facility], and the [facility].  He has responded nicely to the initial treatment and is on weekly low dose dexamethasoen and revlimid 10 mg po d1-21 q28 days.  Plan is for him to drop the Dexamethasone if things continue well around June 2017, and continue on maintenance Revlimid alone.'     His primary care is now at [facility].  He gets b12 Monthly injections at our office He remains SOb on around the clock 02  ALLERGIES: see med card  MEDICATIONS: See Med Card.  PAST MEDICAL HISTORY:  1. Smoldering multiple myeloma.  2. Obesity.  3. Chronic back pain with degenerative disk disease.  4. Coronary artery disease, status post bypass and coronary stents and  previous heart attacks.  5. High blood pressure.  6. Elevated cholesterol.  7. Peripheral vascular disease -- carotid stenosis.  8. History of leg edema.  9. Arthritis.  10.COPD  PREVIOUS SURGERIES: Coronary bypass surgery in 2003. Facial surgery  following an accident many years ago. Lower back surgery in 1998.  SOCIAL HISTORY: He is  with no children. He lives in [place].  He smoked until 1997 averaging two packs a day. He did this for 50 years.  He used to drink up to two cases of beer per week but  stopped in 1995. He  used to be in the [employer] and retired from it and also worked as a riverboat  captain for several years.                Family History   Problem Relation Age of Onset    Heart disease Father       Melanoma Neg Hx       Psoriasis Neg Hx       Lupus Neg Hx       Eczema Neg Hx                       Objective:      Physical Exam   Constitutional: He is oriented to person, place, and time. He appears well-developed and well-nourished.   overweght   HENT:   Head: Normocephalic.   Mouth/Throat: No oropharyngeal exudate.   Eyes: Pupils are equal, round, and reactive to light.   Neck: No thyromegaly present.   Cardiovascular: Normal rate, regular rhythm and normal heart sounds.  Exam reveals no gallop.    No murmur heard.  Pulmonary/Chest: No respiratory distress. He has no wheezes. He has no rales.   Abdominal: Soft. Bowel sounds are normal. He exhibits no distension and no mass. There is no rebound and no guarding.   Musculoskeletal: Normal range of motion. He exhibits no edema.   Lymphadenopathy:     He has no cervical adenopathy.   Neurological: He is alert and oriented to person, place, and time.   Skin: Skin is warm and dry.   Psychiatric: He has a normal mood and affect. His behavior is normal.       Assessment:       1. Multiple myeloma in remission    2. Anxiety    3. Multiple myeloma not having achieved remission    4. Multiple myeloma, remission status unspecified    5. Hypomagnesemia      6.b12 def  Plan:       Lab Results   Component Value Date    WBC 5.33 05/01/2017    HGB 11.3 (L) 05/01/2017    HCT 36.4 (L) 05/01/2017    MCV 95 05/01/2017     (L) 05/01/2017     Lab Results   Component Value Date    CREATININE 1.0 05/01/2017     Lab Results   Component Value Date    ALT 19 05/01/2017    AST 10 05/01/2017    ALKPHOS 61 05/01/2017    BILITOT 0.6 05/01/2017     Continue monthly b12 and   continuerevlimid /dexamethasone  Refill of percocet, cough syrup, mgOx, and NTG  Other medications  [facility]  See me in 4 weeks with a cbc, cmp, spep and free light chains

## 2017-05-02 ENCOUNTER — TELEPHONE (OUTPATIENT)
Dept: HEMATOLOGY/ONCOLOGY | Facility: CLINIC | Age: 82
End: 2017-05-02

## 2017-05-02 LAB
ALBUMIN SERPL ELPH-MCNC: 3.3 G/DL
ALPHA1 GLOB SERPL ELPH-MCNC: 0.42 G/DL
ALPHA2 GLOB SERPL ELPH-MCNC: 1.01 G/DL
B-GLOBULIN SERPL ELPH-MCNC: 0.89 G/DL
GAMMA GLOB SERPL ELPH-MCNC: 0.67 G/DL
PROT SERPL-MCNC: 6.3 G/DL

## 2017-05-02 NOTE — TELEPHONE ENCOUNTER
Attempted to reach pt's wife to let her know that her [facility] paperwork was ready. No answer; unable to leave voicemail. Will try to reach again later this week and/or will have ready when she comes to pick it up.

## 2017-05-03 LAB — PATHOLOGIST INTERPRETATION SPE: NORMAL

## 2017-05-04 ENCOUNTER — OUTPATIENT CASE MANAGEMENT (OUTPATIENT)
Dept: ADMINISTRATIVE | Facility: OTHER | Age: 82
End: 2017-05-04

## 2017-05-04 ENCOUNTER — TELEPHONE (OUTPATIENT)
Dept: HEMATOLOGY/ONCOLOGY | Facility: CLINIC | Age: 82
End: 2017-05-04

## 2017-05-04 NOTE — PROGRESS NOTES
5/4/17 - Phone contact attempted with pt on home phone and wife's phone with no answer. Voice mail was left advising that Didi Blake LCSW, in Hem/Onc dept documented in pt's EMR on 5/2/17 that the paperwork is ready for the [facility] that pt can . She documented being unable to reach pt or leave a voice mail. Letter  mailed by this CM on last contact attempt on 4/28/17 requesting return call if interested in working with [facility]. CM will d/c from [facility] at this time related to 3 unsuccessful attempts to follow up with pt. Елена Ruvalcaba RN

## 2017-05-04 NOTE — TELEPHONE ENCOUNTER
[facility] is requesting a alternative to  Promethazine with DM syrup  It is temporarily out of stock.

## 2017-05-04 NOTE — TELEPHONE ENCOUNTER
----- Message from Zheng Isabel MD sent at 5/4/2017  4:20 PM CDT -----  Please ask wife to give him Mucinex for time being

## 2017-05-08 ENCOUNTER — OUTPATIENT CASE MANAGEMENT (OUTPATIENT)
Dept: ADMINISTRATIVE | Facility: OTHER | Age: 82
End: 2017-05-08

## 2017-05-08 DIAGNOSIS — C90.00 MULTIPLE MYELOMA NOT HAVING ACHIEVED REMISSION: ICD-10-CM

## 2017-05-08 RX ORDER — LENALIDOMIDE 10 MG/1
CAPSULE ORAL
Qty: 21 EACH | Refills: 0 | Status: SHIPPED | OUTPATIENT
Start: 2017-05-08 | End: 2017-06-06 | Stop reason: SDUPTHER

## 2017-05-08 NOTE — PROGRESS NOTES
Please note this patient was mailed a Patient Satisfaction Discharge Survey on 5-8-17        Thank you,    Suad Cortez, SSC

## 2017-05-24 ENCOUNTER — TELEPHONE (OUTPATIENT)
Dept: HEMATOLOGY/ONCOLOGY | Facility: CLINIC | Age: 82
End: 2017-05-24

## 2017-05-24 RX ORDER — ROSUVASTATIN CALCIUM 10 MG/1
TABLET, COATED ORAL
Qty: 90 TABLET | Refills: 2 | OUTPATIENT
Start: 2017-05-24

## 2017-05-24 NOTE — TELEPHONE ENCOUNTER
----- Message from Sheron Blake sent at 5/24/2017 12:45 PM CDT -----  Contact: Mrs. Roe/wife  Mrs. Roe returned the call. She can be contacted at [phone].    Thanks,  Sheron

## 2017-05-24 NOTE — TELEPHONE ENCOUNTER
----- Message from Donna Ocampo sent at 5/24/2017 11:07 AM CDT -----  Contact: Pt wife - keena [phone]  States pt needs to change his b-12 injection to May 30 and not June 2. The appt was resched per his wife Keena and can be reached at [phone]//thanks/dbw

## 2017-05-30 ENCOUNTER — INFUSION (OUTPATIENT)
Dept: INFUSION THERAPY | Facility: HOSPITAL | Age: 82
End: 2017-05-30
Attending: INTERNAL MEDICINE
Payer: MEDICARE

## 2017-05-30 ENCOUNTER — OFFICE VISIT (OUTPATIENT)
Dept: HEMATOLOGY/ONCOLOGY | Facility: CLINIC | Age: 82
End: 2017-05-30
Payer: MEDICARE

## 2017-05-30 VITALS
BODY MASS INDEX: 33.28 KG/M2 | TEMPERATURE: 98 F | HEART RATE: 68 BPM | DIASTOLIC BLOOD PRESSURE: 46 MMHG | HEIGHT: 67 IN | BODY MASS INDEX: 33.28 KG/M2 | OXYGEN SATURATION: 98 % | HEIGHT: 67 IN | WEIGHT: 212.06 LBS | WEIGHT: 212.06 LBS | SYSTOLIC BLOOD PRESSURE: 108 MMHG

## 2017-05-30 DIAGNOSIS — J44.9 CHRONIC OBSTRUCTIVE PULMONARY DISEASE, UNSPECIFIED COPD TYPE: Primary | ICD-10-CM

## 2017-05-30 DIAGNOSIS — M54.50 CHRONIC LOW BACK PAIN WITHOUT SCIATICA, UNSPECIFIED BACK PAIN LATERALITY: ICD-10-CM

## 2017-05-30 DIAGNOSIS — C90.00 MULTIPLE MYELOMA, REMISSION STATUS UNSPECIFIED: ICD-10-CM

## 2017-05-30 DIAGNOSIS — D51.8 OTHER VITAMIN B12 DEFICIENCY ANEMIA: Primary | ICD-10-CM

## 2017-05-30 DIAGNOSIS — D51.8 OTHER VITAMIN B12 DEFICIENCY ANEMIA: ICD-10-CM

## 2017-05-30 DIAGNOSIS — C90.00 MULTIPLE MYELOMA NOT HAVING ACHIEVED REMISSION: ICD-10-CM

## 2017-05-30 DIAGNOSIS — G89.29 CHRONIC LOW BACK PAIN WITHOUT SCIATICA, UNSPECIFIED BACK PAIN LATERALITY: ICD-10-CM

## 2017-05-30 PROCEDURE — 1126F AMNT PAIN NOTED NONE PRSNT: CPT | Mod: S$GLB,,, | Performed by: INTERNAL MEDICINE

## 2017-05-30 PROCEDURE — 1157F ADVNC CARE PLAN IN RCRD: CPT | Mod: S$GLB,,, | Performed by: INTERNAL MEDICINE

## 2017-05-30 PROCEDURE — 99214 OFFICE O/P EST MOD 30 MIN: CPT | Mod: 25,S$GLB,, | Performed by: INTERNAL MEDICINE

## 2017-05-30 PROCEDURE — 99499 UNLISTED E&M SERVICE: CPT | Mod: S$GLB,,, | Performed by: INTERNAL MEDICINE

## 2017-05-30 PROCEDURE — 99999 PR PBB SHADOW E&M-EST. PATIENT-LVL IV: CPT | Mod: PBBFAC,,, | Performed by: INTERNAL MEDICINE

## 2017-05-30 PROCEDURE — 96372 THER/PROPH/DIAG INJ SC/IM: CPT

## 2017-05-30 PROCEDURE — 63600175 PHARM REV CODE 636 W HCPCS: Performed by: INTERNAL MEDICINE

## 2017-05-30 PROCEDURE — 1159F MED LIST DOCD IN RCRD: CPT | Mod: S$GLB,,, | Performed by: INTERNAL MEDICINE

## 2017-05-30 RX ORDER — CYANOCOBALAMIN 1000 UG/ML
1000 INJECTION, SOLUTION INTRAMUSCULAR; SUBCUTANEOUS
Status: CANCELLED | OUTPATIENT
Start: 2017-06-01

## 2017-05-30 RX ORDER — CYANOCOBALAMIN 1000 UG/ML
1000 INJECTION, SOLUTION INTRAMUSCULAR; SUBCUTANEOUS
Status: DISCONTINUED | OUTPATIENT
Start: 2017-05-30 | End: 2017-05-30 | Stop reason: HOSPADM

## 2017-05-30 RX ORDER — OXYCODONE AND ACETAMINOPHEN 10; 325 MG/1; MG/1
1 TABLET ORAL EVERY 6 HOURS PRN
Qty: 60 TABLET | Refills: 0 | Status: SHIPPED | OUTPATIENT
Start: 2017-05-30 | End: 2017-06-21 | Stop reason: SDUPTHER

## 2017-05-30 RX ORDER — DEXAMETHASONE 4 MG/1
TABLET ORAL
Qty: 40 TABLET | Refills: 6 | Status: SHIPPED | OUTPATIENT
Start: 2017-05-30 | End: 2017-05-30 | Stop reason: SDUPTHER

## 2017-05-30 RX ORDER — OXYCODONE AND ACETAMINOPHEN 10; 325 MG/1; MG/1
1 TABLET ORAL EVERY 6 HOURS PRN
Qty: 60 TABLET | Refills: 0 | Status: SHIPPED | OUTPATIENT
Start: 2017-05-30 | End: 2017-05-30 | Stop reason: SDUPTHER

## 2017-05-30 RX ORDER — DEXAMETHASONE 4 MG/1
TABLET ORAL
Qty: 40 TABLET | Refills: 6 | Status: SHIPPED | OUTPATIENT
Start: 2017-05-30

## 2017-05-30 RX ADMIN — CYANOCOBALAMIN 1000 MCG: 1000 INJECTION, SOLUTION INTRAMUSCULAR; SUBCUTANEOUS at 02:05

## 2017-05-30 NOTE — PROGRESS NOTES
Subjective:       Patient ID: Lorne Roe is a 82 y.o. male.    Chief Complaint: No chief complaint on file.    HPI HPI This is a 81 year-old  gentleman who comes today for follow up of his multiple. myeloma.  He is   known to us since 11/2008 because of mild anemia. Work up at that time showed vitamin B12 in the lower limits of normal. He was given vitamin B12 supplements once a month with improvement. In addition, a serum protein electrophoresis was reported as positive showing an IgA monoclonal spike of 1.18 g. Eventually it sisi to 1.3 g. He had x-rays of the skull and other bones all of which were negative. His UPEP was negative for a monoclonal spike. His chem 20 as normal and has remained with normal calcium, protein and creatinine tthorugh the years. He had a bone marrow aspiration and biopsy on 05/03/2010 because of his increase in the spike of serum protein electrophoresis. 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    Serum free light chains in 07/2009 were reported as increased mostly at the expense of the lambda chains. There were measured at 7.13. We felt that based on the absence of lytic lesions, hypercalcemia, renal failure or significant anemia, he had at best a smoldering myeloma that we could follow expectantly. \He has chronic back pain of the spine    Imaging studies on 03/17/2011 ordered by orthopedics showed some disk disease and degenerative joint disease but no evidence of myeloma.    H A repeat MRI of the LS spine on 8/2014 showed DJD changes and no obvious evidence of myeloma.  Back pain continues about the same     He has chronic SOB for which he follows up with dr Szymanski and is on home 02.  His lab test in October 2016 show anemia of  10.8  calcium is normal, total Serum protein WAS 10.  SPEp showed a monoclonal spike of 4.82 gr with lambda free light chains at 108 hE had a small monoclonal pspike represetning 12% of the protein in the  urine  He was asked to have a bone marrow. He was diagnosed as having active myeloma.> Biopsy was done at the [facility]. Report is in the MR MRdia section of EPIC.  Cytogenetics were ported as follows: 46XY, alonso(3), t(3;3), (P25, q21) , (15/46 XY (5)     He also had a positive T cell gene rearangement which raised the possibility of a concomitant T cell disorder. It was felt ,yeloma was the leading issue, and to treat him for it.  He just came out of the hospital after a prolonged series ofback to back admissions to the [facility], [facility], and the [facility].  He has responded nicely to the initial treatment and is on weekly low dose dexamethasoen and revlimid 10 mg po d1-21 q28 days.  Plan is for him to drop the Dexamethasone if things continue well around June 2017, and continue on maintenance Revlimid alone.'     His primary care is now at [facility] system.  He gets b12 Monthly injections at our office He remains SOb on around the clock 02  ALLERGIES: see med card  MEDICATIONS: See Med Card.  PAST MEDICAL HISTORY:  1. Smoldering multiple myeloma.  2. Obesity.  3. Chronic back pain with degenerative disk disease.  4. Coronary artery disease, status post bypass and coronary stents and  previous heart attacks.  5. High blood pressure.  6. Elevated cholesterol.  7. Peripheral vascular disease -- carotid stenosis.  8. History of leg edema.  9. Arthritis.  10.COPD  11.b12  deficiency  PREVIOUS SURGERIES: Coronary bypass surgery in 2003. Facial surgery  following an accident many years ago. Lower back surgery in 1998.  SOCIAL HISTORY: He is  with no children. He lives in [place].  He smoked until 1997 averaging two packs a day. He did this for 50 years.  He used to drink up to two cases of beer per week but stopped in 1995. He  used to be in the [employer] and retired from it and also worked as a riverboat  captain for several years.            Family History   Problem Relation Age of Onset     Heart disease Father      Melanoma Neg Hx      Psoriasis Neg Hx      Lupus Neg Hx      Eczema Neg Hx                   Review of Systems    Objective:      Physical Exam   Constitutional: He is oriented to person, place, and time. He appears well-developed and well-nourished.   HENT:   Head: Normocephalic.   Mouth/Throat: No oropharyngeal exudate.   Eyes: Pupils are equal, round, and reactive to light.   Neck: No thyromegaly present.   Cardiovascular: Normal rate, regular rhythm and normal heart sounds.  Exam reveals no gallop.    No murmur heard.  Pulmonary/Chest: No respiratory distress. He has no wheezes. He has no rales.   Abdominal: Soft. Bowel sounds are normal. He exhibits no distension and no mass. There is no rebound and no guarding.   Musculoskeletal: Normal range of motion. He exhibits no edema.   Lymphadenopathy:     He has no cervical adenopathy.   Neurological: He is alert and oriented to person, place, and time.   Skin: Skin is warm and dry.   Psychiatric: He has a normal mood and affect. His behavior is normal.       Wt Readings from Last 3 Encounters:   05/30/17 96.2 kg (212 lb 1.3 oz)   05/30/17 96.2 kg (212 lb 1.3 oz)   05/01/17 94.2 kg (207 lb 10.8 oz)     Temp Readings from Last 3 Encounters:   05/30/17 97.5 °F (36.4 °C) (Oral)   05/01/17 97.5 °F (36.4 °C) (Oral)   03/20/17 97.7 °F (36.5 °C) (Oral)     BP Readings from Last 3 Encounters:   05/30/17 (!) 108/46   05/01/17 (!) 96/46   04/21/17 122/62     Pulse Readings from Last 3 Encounters:   05/30/17 68   05/01/17 88   04/21/17 96       Assessment:       1. Chronic obstructive pulmonary disease, unspecified COPD type    2. Multiple myeloma, remission status unspecified    3. Multiple myeloma not having achieved remission    4. Chronic low back pain without sciatica, unspecified back pain laterality      5-b12 deficiency  Plan:       Lab Results   Component Value Date    WBC 7.93 05/30/2017    HGB 12.0 (L) 05/30/2017    HCT 38.7 (L)  05/30/2017     (H) 05/30/2017     05/30/2017       CMP/free light chains/   spep/ pending  b12 today.  See me in 4 weeks with cbc, cmp, free light chains and SPEp  Continue Rev/dex.  If lab tests remain stable, consider  dropping the dexamethasone portion of the regimen and move to maintenance Revlimid only.

## 2017-06-06 DIAGNOSIS — C90.00 MULTIPLE MYELOMA NOT HAVING ACHIEVED REMISSION: ICD-10-CM

## 2017-06-07 RX ORDER — LENALIDOMIDE 10 MG/1
CAPSULE ORAL
Qty: 21 EACH | Refills: 0 | Status: SHIPPED | OUTPATIENT
Start: 2017-06-07 | End: 2017-07-03 | Stop reason: SDUPTHER

## 2017-06-12 ENCOUNTER — TELEPHONE (OUTPATIENT)
Dept: CARDIOLOGY | Facility: CLINIC | Age: 82
End: 2017-06-12

## 2017-06-12 NOTE — TELEPHONE ENCOUNTER
----- Message from Michell Fisher sent at 6/12/2017 12:36 PM CDT -----  Contact: lulu ken/  Call caller regarding pt med   ..[phone] (home) [phone] (work)

## 2017-06-13 ENCOUNTER — TELEPHONE (OUTPATIENT)
Dept: HEMATOLOGY/ONCOLOGY | Facility: CLINIC | Age: 82
End: 2017-06-13

## 2017-06-21 DIAGNOSIS — C90.00 MULTIPLE MYELOMA NOT HAVING ACHIEVED REMISSION: ICD-10-CM

## 2017-06-21 NOTE — TELEPHONE ENCOUNTER
----- Message from Yanira Ann sent at 6/21/2017 10:27 AM CDT -----  Contact: wife  Please call pt @ wife @ [phone] , wife have some questions.

## 2017-06-22 RX ORDER — OXYCODONE AND ACETAMINOPHEN 10; 325 MG/1; MG/1
1 TABLET ORAL EVERY 6 HOURS PRN
Qty: 60 TABLET | Refills: 0 | Status: SHIPPED | OUTPATIENT
Start: 2017-06-22 | End: 2017-07-18 | Stop reason: SDUPTHER

## 2017-06-27 ENCOUNTER — TELEPHONE (OUTPATIENT)
Dept: INFUSION THERAPY | Facility: HOSPITAL | Age: 82
End: 2017-06-27

## 2017-06-28 NOTE — TELEPHONE ENCOUNTER
Mr Roe has to go to [place] tomorrow.  He was out in the  Car some yesterday and his feet were extremely swollen.  Mrs Roe wants to know if she can give him 3 furosemide 40 mg if his swell that much.   Quinton Marcelo called to report patient's blood glucose was 503 yesterday.

## 2017-06-29 ENCOUNTER — TELEPHONE (OUTPATIENT)
Dept: HEMATOLOGY/ONCOLOGY | Facility: CLINIC | Age: 82
End: 2017-06-29

## 2017-06-29 NOTE — TELEPHONE ENCOUNTER
----- Message from Robyn Leon sent at 6/29/2017 11:54 AM CDT -----  Contact: [phone]  Would like to consult with nurse about an appt for 7/3. Please call back at [phone]. x. SOHA

## 2017-06-30 NOTE — TELEPHONE ENCOUNTER
----- Message from Zulema Sparks sent at 6/29/2017  3:17 PM CDT -----  Contact: Елена Roe (Spouse)  Елена called and stated she was returning the nurse's call. She can be reached at  [phone].    Thanks,  TF

## 2017-07-03 DIAGNOSIS — C90.00 MULTIPLE MYELOMA NOT HAVING ACHIEVED REMISSION: ICD-10-CM

## 2017-07-03 RX ORDER — LENALIDOMIDE 10 MG/1
CAPSULE ORAL
Qty: 21 EACH | Refills: 0 | Status: SHIPPED | OUTPATIENT
Start: 2017-07-03 | End: 2017-08-02 | Stop reason: SDUPTHER

## 2017-07-06 ENCOUNTER — OFFICE VISIT (OUTPATIENT)
Dept: HEMATOLOGY/ONCOLOGY | Facility: CLINIC | Age: 82
End: 2017-07-06
Payer: MEDICARE

## 2017-07-06 ENCOUNTER — INFUSION (OUTPATIENT)
Dept: INFUSION THERAPY | Facility: HOSPITAL | Age: 82
End: 2017-07-06
Attending: INTERNAL MEDICINE
Payer: MEDICARE

## 2017-07-06 ENCOUNTER — LAB VISIT (OUTPATIENT)
Dept: LAB | Facility: HOSPITAL | Age: 82
End: 2017-07-06
Attending: INTERNAL MEDICINE
Payer: MEDICARE

## 2017-07-06 VITALS
RESPIRATION RATE: 18 BRPM | HEIGHT: 67 IN | DIASTOLIC BLOOD PRESSURE: 62 MMHG | WEIGHT: 212 LBS | HEART RATE: 81 BPM | BODY MASS INDEX: 33.27 KG/M2 | OXYGEN SATURATION: 94 % | SYSTOLIC BLOOD PRESSURE: 110 MMHG

## 2017-07-06 VITALS — HEIGHT: 67 IN | WEIGHT: 212 LBS | BODY MASS INDEX: 33.27 KG/M2

## 2017-07-06 DIAGNOSIS — C90.01 MULTIPLE MYELOMA IN REMISSION: Primary | ICD-10-CM

## 2017-07-06 DIAGNOSIS — C90.01 MULTIPLE MYELOMA IN REMISSION: ICD-10-CM

## 2017-07-06 DIAGNOSIS — D51.8 OTHER VITAMIN B12 DEFICIENCY ANEMIA: Primary | ICD-10-CM

## 2017-07-06 DIAGNOSIS — C90.00 MULTIPLE MYELOMA NOT HAVING ACHIEVED REMISSION: ICD-10-CM

## 2017-07-06 LAB
ALBUMIN SERPL BCP-MCNC: 3.3 G/DL
ALP SERPL-CCNC: 68 U/L
ALT SERPL W/O P-5'-P-CCNC: 21 U/L
ANION GAP SERPL CALC-SCNC: 12 MMOL/L
AST SERPL-CCNC: 13 U/L
BASOPHILS # BLD AUTO: 0.01 K/UL
BASOPHILS NFR BLD: 0.1 %
BILIRUB SERPL-MCNC: 0.6 MG/DL
BUN SERPL-MCNC: 14 MG/DL
CALCIUM SERPL-MCNC: 9.4 MG/DL
CHLORIDE SERPL-SCNC: 92 MMOL/L
CO2 SERPL-SCNC: 35 MMOL/L
CREAT SERPL-MCNC: 0.9 MG/DL
DIFFERENTIAL METHOD: ABNORMAL
EOSINOPHIL # BLD AUTO: 0.5 K/UL
EOSINOPHIL NFR BLD: 5.1 %
ERYTHROCYTE [DISTWIDTH] IN BLOOD BY AUTOMATED COUNT: 18.4 %
EST. GFR  (AFRICAN AMERICAN): >60 ML/MIN/1.73 M^2
EST. GFR  (NON AFRICAN AMERICAN): >60 ML/MIN/1.73 M^2
GLUCOSE SERPL-MCNC: 135 MG/DL
HCT VFR BLD AUTO: 37.9 %
HGB BLD-MCNC: 12.1 G/DL
LYMPHOCYTES # BLD AUTO: 2 K/UL
LYMPHOCYTES NFR BLD: 21.7 %
MCH RBC QN AUTO: 31.9 PG
MCHC RBC AUTO-ENTMCNC: 31.9 %
MCV RBC AUTO: 100 FL
MONOCYTES # BLD AUTO: 1.5 K/UL
MONOCYTES NFR BLD: 16.8 %
NEUTROPHILS # BLD AUTO: 5.2 K/UL
NEUTROPHILS NFR BLD: 56.3 %
PLATELET # BLD AUTO: 159 K/UL
PMV BLD AUTO: 9.3 FL
POTASSIUM SERPL-SCNC: 3.3 MMOL/L
PROT SERPL-MCNC: 6.5 G/DL
RBC # BLD AUTO: 3.79 M/UL
SODIUM SERPL-SCNC: 139 MMOL/L
WBC # BLD AUTO: 9.19 K/UL

## 2017-07-06 PROCEDURE — 1159F MED LIST DOCD IN RCRD: CPT | Mod: S$GLB,,, | Performed by: INTERNAL MEDICINE

## 2017-07-06 PROCEDURE — 83520 IMMUNOASSAY QUANT NOS NONAB: CPT

## 2017-07-06 PROCEDURE — 99214 OFFICE O/P EST MOD 30 MIN: CPT | Mod: 25,S$GLB,, | Performed by: INTERNAL MEDICINE

## 2017-07-06 PROCEDURE — 96372 THER/PROPH/DIAG INJ SC/IM: CPT

## 2017-07-06 PROCEDURE — 36415 COLL VENOUS BLD VENIPUNCTURE: CPT | Mod: PO

## 2017-07-06 PROCEDURE — 84165 PROTEIN E-PHORESIS SERUM: CPT | Mod: 26,,, | Performed by: PATHOLOGY

## 2017-07-06 PROCEDURE — 99499 UNLISTED E&M SERVICE: CPT | Mod: S$GLB,,, | Performed by: INTERNAL MEDICINE

## 2017-07-06 PROCEDURE — 80053 COMPREHEN METABOLIC PANEL: CPT

## 2017-07-06 PROCEDURE — 84165 PROTEIN E-PHORESIS SERUM: CPT

## 2017-07-06 PROCEDURE — 1157F ADVNC CARE PLAN IN RCRD: CPT | Mod: S$GLB,,, | Performed by: INTERNAL MEDICINE

## 2017-07-06 PROCEDURE — 63600175 PHARM REV CODE 636 W HCPCS: Performed by: INTERNAL MEDICINE

## 2017-07-06 PROCEDURE — 99999 PR PBB SHADOW E&M-EST. PATIENT-LVL V: CPT | Mod: PBBFAC,,, | Performed by: INTERNAL MEDICINE

## 2017-07-06 PROCEDURE — 1126F AMNT PAIN NOTED NONE PRSNT: CPT | Mod: S$GLB,,, | Performed by: INTERNAL MEDICINE

## 2017-07-06 PROCEDURE — 85025 COMPLETE CBC W/AUTO DIFF WBC: CPT

## 2017-07-06 RX ORDER — SPIRONOLACTONE 50 MG/1
TABLET, FILM COATED ORAL
Qty: 90 TABLET | Refills: 0 | Status: SHIPPED | OUTPATIENT
Start: 2017-07-06 | End: 2018-03-21 | Stop reason: SDUPTHER

## 2017-07-06 RX ORDER — CYANOCOBALAMIN 1000 UG/ML
1000 INJECTION, SOLUTION INTRAMUSCULAR; SUBCUTANEOUS
Status: CANCELLED | OUTPATIENT
Start: 2017-07-06

## 2017-07-06 RX ORDER — CYANOCOBALAMIN 1000 UG/ML
1000 INJECTION, SOLUTION INTRAMUSCULAR; SUBCUTANEOUS
Status: DISCONTINUED | OUTPATIENT
Start: 2017-07-06 | End: 2017-07-06 | Stop reason: HOSPADM

## 2017-07-06 RX ADMIN — CYANOCOBALAMIN 1000 MCG: 1000 INJECTION, SOLUTION INTRAMUSCULAR at 12:07

## 2017-07-06 NOTE — PROGRESS NOTES
Subjective:       Patient ID: Lorne Roe is a 82 y.o. male.    Chief Complaint: Multiple Myeloma    HPI This is a 81 year-old  gentleman who comes today for follow up of his multiple. myeloma.  He is   known to us since 11/2008 because of mild anemia. Work up at that time showed vitamin B12 in the lower limits of normal. He was given vitamin B12 supplements once a month with improvement. In addition, a serum protein electrophoresis was reported as positive showing an IgA monoclonal spike of 1.18 g. Eventually it sisi to 1.3 g. He had x-rays of the skull and other bones all of which were negative. His UPEP was negative for a monoclonal spike. His chem 20 as normal and has remained with normal calcium, protein and creatinine tthorugh the years. He had a bone marrow aspiration and biopsy on 05/03/2010 because of his increase in the spike of serum protein electrophoresis. 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    Serum free light chains in 07/2009 were reported as increased mostly at the expense of the lambda chains. There were measured at 7.13. We felt that based on the absence of lytic lesions, hypercalcemia, renal failure or significant anemia, he had at best a smoldering myeloma that we could follow expectantly. \He has chronic back pain of the spine    Imaging studies on 03/17/2011 ordered by orthopedics showed some disk disease and degenerative joint disease but no evidence of myeloma.    H A repeat MRI of the LS spine on 8/2014 showed DJD changes and no obvious evidence of myeloma.  Back pain continues about the same     He has chronic SOB for which he follows up with dr Szymanski and is on home 02.  His lab test in October 2016 show anemia of  10.8  calcium is normal, total Serum protein WAS 10.  SPEp showed a monoclonal spike of 4.82 gr with lambda free light chains at 108 hE had a small monoclonal pspike represetning 12% of the protein in the urine  He was  asked to have a bone marrow. He was diagnosed as having active myeloma.> Biopsy was done at the [facility]. Report is in the MR MRdia section of EPIC.  Cytogenetics were ported as follows: 46XY, alonso(3), t(3;3), (P25, q21) , (15/46 XY (5)     He also had a positive T cell gene rearangement which raised the possibility of a concomitant T cell disorder. It was felt ,yeloma was the leading issue, and to treat him for it.  He just came out of the hospital after a prolonged series ofback to back admissions to the [facility], [facility], and the [facility].  He has responded nicely to the initial treatment and is on weekly low dose dexamethasoen and revlimid 10 mg po d1-21 q28 days.  Plan is for him to drop the Dexamethasone if things continue well around June 2017, and continue on maintenance Revlimid alone.'     His primary care is now at [facility] system.  He gets b12 Monthly injections at our office He remains SOb on around the clock 02  ALLERGIES: see med card  MEDICATIONS: See Med Card.  PAST MEDICAL HISTORY:  1. Smoldering multiple myeloma.  2. Obesity.  3. Chronic back pain with degenerative disk disease.  4. Coronary artery disease, status post bypass and coronary stents and  previous heart attacks.  5. High blood pressure.  6. Elevated cholesterol.  7. Peripheral vascular disease -- carotid stenosis.  8. History of leg edema.  9. Arthritis.  10.COPD  11.b12  deficiency  PREVIOUS SURGERIES: Coronary bypass surgery in 2003. Facial surgery  following an accident many years ago. Lower back surgery in 1998.  SOCIAL HISTORY: He is  with no children. He lives in [place].  He smoked until 1997 averaging two packs a day. He did this for 50 years.  He used to drink up to two cases of beer per week but stopped in 1995. He  used to be in the [employer] and retired from it and also worked as a riverboat  captain for several years.            Family History   Problem Relation Age of Onset    Heart disease  Father      Melanoma Neg Hx      Psoriasis Neg Hx      Lupus Neg Hx      Eczema Neg Hx                   Review of Systems   Constitutional: Negative.  Negative for fatigue.   Eyes: Negative.    Respiratory:        Chronic SOB on oxygen   Cardiovascular: Negative.  Negative for chest pain.   Gastrointestinal: Negative for abdominal pain and nausea.   Genitourinary: Negative.  Negative for hematuria.   Musculoskeletal:        Chronic back pain   Skin: Negative.    Neurological: Negative.    Psychiatric/Behavioral: Negative.        Objective:      Physical Exam   Constitutional: He is oriented to person, place, and time. He appears well-developed and well-nourished.   HENT:   Head: Normocephalic.   Mouth/Throat: No oropharyngeal exudate.   Eyes: Pupils are equal, round, and reactive to light.   Neck: No thyromegaly present.   Cardiovascular: Normal rate, regular rhythm and normal heart sounds.  Exam reveals no gallop.    No murmur heard.  Pulmonary/Chest: No respiratory distress. He has no wheezes. He has no rales.   Abdominal: Soft. Bowel sounds are normal. He exhibits no distension and no mass. There is no rebound and no guarding.   Musculoskeletal: Normal range of motion. He exhibits no edema.   Lymphadenopathy:     He has no cervical adenopathy.   Neurological: He is alert and oriented to person, place, and time.   Skin: Skin is warm and dry.   Psychiatric: He has a normal mood and affect. His behavior is normal.     .  Wt Readings from Last 3 Encounters:   07/06/17 96.2 kg (212 lb)   07/06/17 96.2 kg (212 lb)   05/30/17 96.2 kg (212 lb 1.3 oz)     Temp Readings from Last 3 Encounters:   05/30/17 97.5 °F (36.4 °C) (Oral)   05/01/17 97.5 °F (36.4 °C) (Oral)   03/20/17 97.7 °F (36.5 °C) (Oral)     BP Readings from Last 3 Encounters:   07/06/17 110/62   05/30/17 (!) 108/46   05/01/17 (!) 96/46     Pulse Readings from Last 3 Encounters:   07/06/17 81   05/30/17 68   05/01/17 88         Assessment:       1.  Multiple myeloma in remission        Plan:       Lab Results   Component Value Date    WBC 9.19 07/06/2017    HGB 12.1 (L) 07/06/2017    HCT 37.9 (L) 07/06/2017     (H) 07/06/2017     07/06/2017       Lab Results   Component Value Date    CREATININE 0.9 07/06/2017     Lab Results   Component Value Date    ALT 21 07/06/2017    AST 13 07/06/2017    ALKPHOS 68 07/06/2017    BILITOT 0.6 07/06/2017       We will have him hold the dexamethasone and continue on Revlimid only.  b12 injections today. ee me in 4 weeks with a    cbc and a cmp

## 2017-07-07 LAB
ALBUMIN SERPL ELPH-MCNC: 3.29 G/DL
ALPHA1 GLOB SERPL ELPH-MCNC: 0.31 G/DL
ALPHA2 GLOB SERPL ELPH-MCNC: 0.89 G/DL
B-GLOBULIN SERPL ELPH-MCNC: 0.8 G/DL
GAMMA GLOB SERPL ELPH-MCNC: 0.61 G/DL
KAPPA LC SER QL IA: 1.52 MG/DL
KAPPA LC/LAMBDA SER IA: 0.51
LAMBDA LC SER QL IA: 2.97 MG/DL
PATHOLOGIST INTERPRETATION SPE: NORMAL
PROT SERPL-MCNC: 5.9 G/DL

## 2017-07-18 DIAGNOSIS — C90.00 MULTIPLE MYELOMA NOT HAVING ACHIEVED REMISSION: ICD-10-CM

## 2017-07-18 RX ORDER — OXYCODONE AND ACETAMINOPHEN 10; 325 MG/1; MG/1
1 TABLET ORAL EVERY 6 HOURS PRN
Qty: 60 TABLET | Refills: 0 | Status: SHIPPED | OUTPATIENT
Start: 2017-07-18 | End: 2017-09-15 | Stop reason: SDUPTHER

## 2017-07-18 NOTE — TELEPHONE ENCOUNTER
----- Message from Sandy Fisher sent at 7/18/2017 12:26 PM CDT -----  Contact: Flaquita/wife  Flaquita will be in on tomorrow to  prescription for pain medication.    Thanks  td

## 2017-07-19 ENCOUNTER — TELEPHONE (OUTPATIENT)
Dept: HEMATOLOGY/ONCOLOGY | Facility: CLINIC | Age: 82
End: 2017-07-19

## 2017-07-19 NOTE — TELEPHONE ENCOUNTER
----- Message from Sheila Obregon sent at 7/19/2017 10:25 AM CDT -----  Contact: Pt/ Wife  Pt wife wants to speak with nurse. Please give wife a call at .[phone] (home) [phone] (work)

## 2017-07-19 NOTE — TELEPHONE ENCOUNTER
Wife stated patient needed an increase in pain medication due to an increase in pain. Advised patient's wife patient would need to see Dr Isabel. Patient's wife declined appointment and states she will come tomorrow to talk to Dr Isabel.

## 2017-07-20 DIAGNOSIS — K21.00 REFLUX ESOPHAGITIS: Primary | ICD-10-CM

## 2017-07-20 RX ORDER — PANTOPRAZOLE SODIUM 40 MG/1
40 TABLET, DELAYED RELEASE ORAL DAILY
Qty: 30 TABLET | Refills: 6 | Status: SHIPPED | OUTPATIENT
Start: 2017-07-20 | End: 2018-07-20

## 2017-08-02 ENCOUNTER — LAB VISIT (OUTPATIENT)
Dept: LAB | Facility: HOSPITAL | Age: 82
End: 2017-08-02
Attending: INTERNAL MEDICINE
Payer: MEDICARE

## 2017-08-02 ENCOUNTER — HOSPITAL ENCOUNTER (OUTPATIENT)
Dept: RADIOLOGY | Facility: HOSPITAL | Age: 82
Discharge: HOME | End: 2017-08-02
Attending: INTERNAL MEDICINE
Payer: MEDICARE

## 2017-08-02 DIAGNOSIS — M54.2 NECK PAIN: ICD-10-CM

## 2017-08-02 DIAGNOSIS — M25.511 BILATERAL SHOULDER PAIN, UNSPECIFIED CHRONICITY: ICD-10-CM

## 2017-08-02 DIAGNOSIS — C90.00 MULTIPLE MYELOMA NOT HAVING ACHIEVED REMISSION: ICD-10-CM

## 2017-08-02 DIAGNOSIS — M54.2 NECK PAIN: Primary | ICD-10-CM

## 2017-08-02 DIAGNOSIS — M25.512 BILATERAL SHOULDER PAIN, UNSPECIFIED CHRONICITY: ICD-10-CM

## 2017-08-02 PROCEDURE — 36415 COLL VENOUS BLD VENIPUNCTURE: CPT | Mod: PO

## 2017-08-02 PROCEDURE — 84165 PROTEIN E-PHORESIS SERUM: CPT

## 2017-08-02 PROCEDURE — 72040 X-RAY EXAM NECK SPINE 2-3 VW: CPT | Mod: TC,PO

## 2017-08-02 PROCEDURE — 73030 X-RAY EXAM OF SHOULDER: CPT | Mod: 26,50,, | Performed by: RADIOLOGY

## 2017-08-02 PROCEDURE — 73030 X-RAY EXAM OF SHOULDER: CPT | Mod: TC,50,PO

## 2017-08-02 PROCEDURE — 72040 X-RAY EXAM NECK SPINE 2-3 VW: CPT | Mod: 26,,, | Performed by: RADIOLOGY

## 2017-08-02 PROCEDURE — 84165 PROTEIN E-PHORESIS SERUM: CPT | Mod: 26,,, | Performed by: PATHOLOGY

## 2017-08-02 PROCEDURE — 83520 IMMUNOASSAY QUANT NOS NONAB: CPT

## 2017-08-02 RX ORDER — LENALIDOMIDE 10 MG/1
CAPSULE ORAL
Qty: 21 EACH | Refills: 0 | Status: SHIPPED | OUTPATIENT
Start: 2017-08-02 | End: 2017-10-05 | Stop reason: SDUPTHER

## 2017-08-03 ENCOUNTER — TELEPHONE (OUTPATIENT)
Dept: HEMATOLOGY/ONCOLOGY | Facility: CLINIC | Age: 82
End: 2017-08-03

## 2017-08-03 DIAGNOSIS — E53.8 B12 DEFICIENCY: Primary | ICD-10-CM

## 2017-08-03 LAB
ALBUMIN SERPL ELPH-MCNC: 2.83 G/DL
ALPHA1 GLOB SERPL ELPH-MCNC: 0.45 G/DL
ALPHA2 GLOB SERPL ELPH-MCNC: 0.99 G/DL
B-GLOBULIN SERPL ELPH-MCNC: 0.91 G/DL
GAMMA GLOB SERPL ELPH-MCNC: 0.82 G/DL
KAPPA LC SER QL IA: 3.47 MG/DL
KAPPA LC/LAMBDA SER IA: 0.44
LAMBDA LC SER QL IA: 7.81 MG/DL
PATHOLOGIST INTERPRETATION SPE: NORMAL
PROT SERPL-MCNC: 6 G/DL

## 2017-08-03 NOTE — TELEPHONE ENCOUNTER
----- Message from Zheng Isabel MD sent at 8/3/2017  3:03 PM CDT -----  Add b12 level to labs to be drawn when he comes in next  DR ISABEL

## 2017-08-03 NOTE — TELEPHONE ENCOUNTER
----- Message from Sandy Fisher sent at 8/3/2017  8:59 AM CDT -----  Contact: Елена/wife   Елена needs an call back at [phone], Regards to patient appointment.    Thanks  td

## 2017-08-03 NOTE — TELEPHONE ENCOUNTER
----- Message from Hortencia Coleman sent at 8/3/2017  8:00 AM CDT -----  Contact: pt wife keena  Please call pt wife back at [phone] in regard to her .

## 2017-08-03 NOTE — TELEPHONE ENCOUNTER
----- Message from Zheng Isabel MD sent at 8/3/2017 11:20 AM CDT -----  X rays of the neck and shoulder only show arthritis.  CBC and cmp were not done so they have to be done when they come back again.  Needs to keep appointment ffor next week with a ccb and acmp  DR ISABEL

## 2017-08-08 ENCOUNTER — TELEPHONE (OUTPATIENT)
Dept: HEMATOLOGY/ONCOLOGY | Facility: CLINIC | Age: 82
End: 2017-08-08

## 2017-08-08 NOTE — TELEPHONE ENCOUNTER
----- Message from Cici Marie sent at 8/8/2017  2:28 PM CDT -----  Contact: [phone]  Returning call. Please call back at [phone].    Thanks,  Cici Marie

## 2017-08-08 NOTE — TELEPHONE ENCOUNTER
----- Message from Deidra Robledo sent at 8/8/2017 11:10 AM CDT -----  Contact: wife  The pt wants to reschedule his infusion appt to next week, pt can be reached at [phone]///thxMW

## 2017-08-15 ENCOUNTER — TELEPHONE (OUTPATIENT)
Dept: HEMATOLOGY/ONCOLOGY | Facility: CLINIC | Age: 82
End: 2017-08-15

## 2017-08-15 NOTE — TELEPHONE ENCOUNTER
----- Message from Racheal Robledo sent at 8/15/2017 10:07 AM CDT -----  Contact: pt spouse-  States patient is in [facility]. Declined to give any additional information. Please adv/call [phone].//thanks. cw

## 2017-08-16 ENCOUNTER — TELEPHONE (OUTPATIENT)
Dept: HEMATOLOGY/ONCOLOGY | Facility: CLINIC | Age: 82
End: 2017-08-16

## 2017-08-16 NOTE — TELEPHONE ENCOUNTER
----- Message from Angelina rGanado sent at 8/16/2017  9:45 AM CDT -----  Contact: Елена - wife  She would like for Dr Isabel to call her as soon as possible.  They are wanting to do surgery on the patient, but she will not let then until she talks to you.  Call her at [phone].                                             chandler

## 2017-08-31 DIAGNOSIS — C90.00 MULTIPLE MYELOMA NOT HAVING ACHIEVED REMISSION: ICD-10-CM

## 2017-08-31 RX ORDER — LENALIDOMIDE 10 MG/1
CAPSULE ORAL
Qty: 21 EACH | Refills: 0 | Status: CANCELLED | OUTPATIENT
Start: 2017-08-31

## 2017-08-31 NOTE — TELEPHONE ENCOUNTER
Before refilling patients Revlimid need to know if patient is still in the hospital and if he is still taking medication.  Will try again tomorrow to contact wife

## 2017-09-05 ENCOUNTER — TELEPHONE (OUTPATIENT)
Dept: HEMATOLOGY/ONCOLOGY | Facility: CLINIC | Age: 82
End: 2017-09-05

## 2017-09-05 DIAGNOSIS — C90.01 MULTIPLE MYELOMA IN REMISSION: Primary | ICD-10-CM

## 2017-09-05 DIAGNOSIS — F41.9 ANXIETY: ICD-10-CM

## 2017-09-05 NOTE — TELEPHONE ENCOUNTER
Patient is out of the hospital.  He will be in to see you on Monday.  He has been off the Revlimid since 08/16/17  But wife said she was going to start him back on it because he has finished his antibiotics.  She already has some Revlimid does not need a refill.  He will need orders for lab.  She also wants to get his b12 injection on Monday.

## 2017-09-05 NOTE — TELEPHONE ENCOUNTER
----- Message from Zheng Isabel MD sent at 9/5/2017  6:30 AM CDT -----  Please try to find out where he is, if he is taking the revlmid and if he needs a refill of the revlimid  Thanks  DR ISABEL

## 2017-09-07 RX ORDER — CLONAZEPAM 0.5 MG/1
TABLET ORAL
Qty: 60 TABLET | Refills: 0 | OUTPATIENT
Start: 2017-09-07

## 2017-09-11 ENCOUNTER — TELEPHONE (OUTPATIENT)
Dept: HEMATOLOGY/ONCOLOGY | Facility: CLINIC | Age: 82
End: 2017-09-11

## 2017-09-11 NOTE — TELEPHONE ENCOUNTER
----- Message from Simi Spicer sent at 9/11/2017 11:32 AM CDT -----  Contact: Елена Roe (Spouse)  Елена Roe (Spouse) is requesting a call from nurse to get a sooner apt . Pt was offered first available.          Please call Елена Roe (Spouse) back at [phone]

## 2017-09-15 ENCOUNTER — LAB VISIT (OUTPATIENT)
Dept: LAB | Facility: HOSPITAL | Age: 82
End: 2017-09-15
Attending: INTERNAL MEDICINE
Payer: MEDICARE

## 2017-09-15 ENCOUNTER — INFUSION (OUTPATIENT)
Dept: INFUSION THERAPY | Facility: HOSPITAL | Age: 82
End: 2017-09-15
Attending: INTERNAL MEDICINE
Payer: MEDICARE

## 2017-09-15 ENCOUNTER — OFFICE VISIT (OUTPATIENT)
Dept: HEMATOLOGY/ONCOLOGY | Facility: CLINIC | Age: 82
End: 2017-09-15
Payer: MEDICARE

## 2017-09-15 VITALS
OXYGEN SATURATION: 96 % | TEMPERATURE: 96 F | RESPIRATION RATE: 18 BRPM | DIASTOLIC BLOOD PRESSURE: 64 MMHG | SYSTOLIC BLOOD PRESSURE: 110 MMHG | HEART RATE: 92 BPM | HEIGHT: 67 IN

## 2017-09-15 DIAGNOSIS — C90.00 MULTIPLE MYELOMA NOT HAVING ACHIEVED REMISSION: ICD-10-CM

## 2017-09-15 DIAGNOSIS — D69.6 THROMBOCYTOPENIA: Primary | ICD-10-CM

## 2017-09-15 DIAGNOSIS — C90.01 MULTIPLE MYELOMA IN REMISSION: ICD-10-CM

## 2017-09-15 DIAGNOSIS — D51.8 OTHER VITAMIN B12 DEFICIENCY ANEMIA: Primary | ICD-10-CM

## 2017-09-15 DIAGNOSIS — E55.9 VITAMIN D DEFICIENCY DISEASE: ICD-10-CM

## 2017-09-15 LAB
ALBUMIN SERPL BCP-MCNC: 3.1 G/DL
ALP SERPL-CCNC: 85 U/L
ALT SERPL W/O P-5'-P-CCNC: 7 U/L
ANION GAP SERPL CALC-SCNC: 12 MMOL/L
AST SERPL-CCNC: 11 U/L
BASOPHILS # BLD AUTO: 0.01 K/UL
BASOPHILS NFR BLD: 0.1 %
BILIRUB SERPL-MCNC: 0.6 MG/DL
BUN SERPL-MCNC: 8 MG/DL
CALCIUM SERPL-MCNC: 9.4 MG/DL
CHLORIDE SERPL-SCNC: 100 MMOL/L
CO2 SERPL-SCNC: 27 MMOL/L
CREAT SERPL-MCNC: 0.8 MG/DL
DIFFERENTIAL METHOD: ABNORMAL
EOSINOPHIL # BLD AUTO: 0.3 K/UL
EOSINOPHIL NFR BLD: 3.8 %
ERYTHROCYTE [DISTWIDTH] IN BLOOD BY AUTOMATED COUNT: 16.5 %
EST. GFR  (AFRICAN AMERICAN): >60 ML/MIN/1.73 M^2
EST. GFR  (NON AFRICAN AMERICAN): >60 ML/MIN/1.73 M^2
GLUCOSE SERPL-MCNC: 113 MG/DL
HCT VFR BLD AUTO: 37.5 %
HGB BLD-MCNC: 11.5 G/DL
LYMPHOCYTES # BLD AUTO: 1.4 K/UL
LYMPHOCYTES NFR BLD: 18.6 %
MCH RBC QN AUTO: 30.6 PG
MCHC RBC AUTO-ENTMCNC: 30.7 G/DL
MCV RBC AUTO: 100 FL
MONOCYTES # BLD AUTO: 0.8 K/UL
MONOCYTES NFR BLD: 10.4 %
NEUTROPHILS # BLD AUTO: 4.9 K/UL
NEUTROPHILS NFR BLD: 67.1 %
PLATELET # BLD AUTO: 138 K/UL
PMV BLD AUTO: 9.8 FL
POTASSIUM SERPL-SCNC: 3.8 MMOL/L
PROT SERPL-MCNC: 7 G/DL
RBC # BLD AUTO: 3.76 M/UL
SODIUM SERPL-SCNC: 139 MMOL/L
WBC # BLD AUTO: 7.3 K/UL

## 2017-09-15 PROCEDURE — 85025 COMPLETE CBC W/AUTO DIFF WBC: CPT | Mod: PO

## 2017-09-15 PROCEDURE — 80053 COMPREHEN METABOLIC PANEL: CPT | Mod: PO

## 2017-09-15 PROCEDURE — 1157F ADVNC CARE PLAN IN RCRD: CPT | Mod: S$GLB,,, | Performed by: INTERNAL MEDICINE

## 2017-09-15 PROCEDURE — 99999 PR PBB SHADOW E&M-EST. PATIENT-LVL III: CPT | Mod: PBBFAC,,, | Performed by: INTERNAL MEDICINE

## 2017-09-15 PROCEDURE — 96372 THER/PROPH/DIAG INJ SC/IM: CPT | Mod: PO

## 2017-09-15 PROCEDURE — 63600175 PHARM REV CODE 636 W HCPCS: Mod: PO | Performed by: INTERNAL MEDICINE

## 2017-09-15 PROCEDURE — 36415 COLL VENOUS BLD VENIPUNCTURE: CPT | Mod: PO

## 2017-09-15 PROCEDURE — 3078F DIAST BP <80 MM HG: CPT | Mod: S$GLB,,, | Performed by: INTERNAL MEDICINE

## 2017-09-15 PROCEDURE — 3008F BODY MASS INDEX DOCD: CPT | Mod: S$GLB,,, | Performed by: INTERNAL MEDICINE

## 2017-09-15 PROCEDURE — 1126F AMNT PAIN NOTED NONE PRSNT: CPT | Mod: S$GLB,,, | Performed by: INTERNAL MEDICINE

## 2017-09-15 PROCEDURE — 84165 PROTEIN E-PHORESIS SERUM: CPT | Mod: 26,,, | Performed by: PATHOLOGY

## 2017-09-15 PROCEDURE — 1159F MED LIST DOCD IN RCRD: CPT | Mod: S$GLB,,, | Performed by: INTERNAL MEDICINE

## 2017-09-15 PROCEDURE — 3074F SYST BP LT 130 MM HG: CPT | Mod: S$GLB,,, | Performed by: INTERNAL MEDICINE

## 2017-09-15 PROCEDURE — 83520 IMMUNOASSAY QUANT NOS NONAB: CPT

## 2017-09-15 PROCEDURE — 99214 OFFICE O/P EST MOD 30 MIN: CPT | Mod: S$GLB,,, | Performed by: INTERNAL MEDICINE

## 2017-09-15 PROCEDURE — 99499 UNLISTED E&M SERVICE: CPT | Mod: S$GLB,,, | Performed by: INTERNAL MEDICINE

## 2017-09-15 PROCEDURE — 84165 PROTEIN E-PHORESIS SERUM: CPT

## 2017-09-15 RX ORDER — HEPARIN 100 UNIT/ML
500 SYRINGE INTRAVENOUS
Status: CANCELLED | OUTPATIENT
Start: 2017-10-26

## 2017-09-15 RX ORDER — SODIUM CHLORIDE 0.9 % (FLUSH) 0.9 %
10 SYRINGE (ML) INJECTION
Status: CANCELLED | OUTPATIENT
Start: 2017-10-26

## 2017-09-15 RX ORDER — CYANOCOBALAMIN 1000 UG/ML
1000 INJECTION, SOLUTION INTRAMUSCULAR; SUBCUTANEOUS
Status: CANCELLED | OUTPATIENT
Start: 2017-10-26

## 2017-09-15 RX ORDER — CYANOCOBALAMIN 1000 UG/ML
1000 INJECTION, SOLUTION INTRAMUSCULAR; SUBCUTANEOUS
Status: DISCONTINUED | OUTPATIENT
Start: 2017-09-15 | End: 2017-09-15 | Stop reason: HOSPADM

## 2017-09-15 RX ORDER — CYANOCOBALAMIN 1000 UG/ML
1000 INJECTION, SOLUTION INTRAMUSCULAR; SUBCUTANEOUS
Status: CANCELLED | OUTPATIENT
Start: 2017-09-15

## 2017-09-15 RX ORDER — ERGOCALCIFEROL 1.25 MG/1
CAPSULE ORAL
Qty: 4 CAPSULE | Refills: 3 | Status: SHIPPED | OUTPATIENT
Start: 2017-09-15 | End: 2017-10-12 | Stop reason: SDUPTHER

## 2017-09-15 RX ORDER — OXYCODONE AND ACETAMINOPHEN 10; 325 MG/1; MG/1
1 TABLET ORAL EVERY 6 HOURS PRN
Qty: 60 TABLET | Refills: 0 | Status: SHIPPED | OUTPATIENT
Start: 2017-09-15 | End: 2017-10-16 | Stop reason: SDUPTHER

## 2017-09-15 RX ADMIN — CYANOCOBALAMIN 1000 MCG: 1000 INJECTION, SOLUTION INTRAMUSCULAR at 12:09

## 2017-09-15 NOTE — PROGRESS NOTES
Patient, Lorne Roe (MRN #[id number]), presented with a recent Platelet count less than 150 K/uL consistent with the definition of thrombocytopenia (ICD10 - D69.6).    Platelets   Date Value Ref Range Status   09/15/2017 138 (L) 150 - 350 K/uL Final     The patient's thrombocytopenia was monitored, evaluated, addressed and/or treated. This addendum to the medical record is made on 09/15/2017.

## 2017-09-15 NOTE — PROGRESS NOTES
Subjective:       Patient ID: Lorne Roe is a 82 y.o. male.    Chief Complaint: No chief complaint on file.    HPI This is a 81 year-old  gentleman who comes today for follow up of his multiple. myeloma.  He is   known to us since 11/2008 because of mild anemia. Work up at that time showed vitamin B12 in the lower limits of normal. He was given vitamin B12 supplements once a month with improvement. In addition, a serum protein electrophoresis was reported as positive showing an IgA monoclonal spike of 1.18 g. Eventually it sisi to 1.3 g. He had x-rays of the skull and other bones all of which were negative. His UPEP was negative for a monoclonal spike. His chem 20 as normal and has remained with normal calcium, protein and creatinine tthorugh the years. He had a bone marrow aspiration and biopsy on 05/03/2010 because of his increase in the spike of serum protein electrophoresis. 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    Serum free light chains in 07/2009 were reported as increased mostly at the expense of the lambda chains. There were measured at 7.13. We felt that based on the absence of lytic lesions, hypercalcemia, renal failure or significant anemia, he had at best a smoldering myeloma that we could follow expectantly. \He has chronic back pain of the spine    Imaging studies on 03/17/2011 ordered by orthopedics showed some disk disease and degenerative joint disease but no evidence of myeloma.    H A repeat MRI of the LS spine on 8/2014 showed DJD changes and no obvious evidence of myeloma.  Back pain continues about the same     He has chronic SOB for which he follows up with dr Szymanski and is on home 02.  His lab test in October 2016 show anemia of  10.8  calcium is normal, total Serum protein WAS 10.  SPEp showed a monoclonal spike of 4.82 gr with lambda free light chains at 108 hE had a small monoclonal pspike represetning 12% of the protein in the  urine  He was asked to have a bone marrow. He was diagnosed as having active myeloma.> Biopsy was done at the [facility]. Report is in the MR MRdia section of EPIC.  Cytogenetics were ported as follows: 46XY, alonso(3), t(3;3), (P25, q21) , (15/46 XY (5)     He also had a positive T cell gene rearangement which raised the possibility of a concomitant T cell disorder. It was felt ,yeloma was the leading issue, and to treat him for it.  He just came out of the hospital after a prolonged series ofback to back admissions to the [facility], [facility], and the [facility].  He has responded nicely to the initial treatment and dwas placed  on maintenance revlimid 10 mg po d1-21 q28 days.  P  He came out from a prolonged admission at [facility] due to osteomyelitis of oen the toes requriing amputation of the toe.  He is off antibioticsHis primary care is now at [facility].  He gets b12 Monthly injections at our office He remains SOb on around the clock 02  ALLERGIES: see med card  MEDICATIONS: See Med Card.  PAST MEDICAL HISTORY:  1. Smoldering multiple myeloma.  2. Obesity.  3. Chronic back pain with degenerative disk disease.  4. Coronary artery disease, status post bypass and coronary stents and  previous heart attacks.  5. High blood pressure.  6. Elevated cholesterol.  7. Peripheral vascular disease -- carotid stenosis.  8. History of leg edema.  9. Arthritis.  10.COPD  11.b12  deficiency  Review of Systems   Constitutional: Negative.  Negative for fatigue.   Eyes: Negative.    Respiratory:        Chronic SOB   Cardiovascular: Negative.  Negative for chest pain.   Gastrointestinal: Negative for abdominal pain and nausea.   Genitourinary: Negative.  Negative for hematuria.   Musculoskeletal: Negative.    Skin: Negative.    Neurological: Negative.    Psychiatric/Behavioral: Negative.        Objective:      Physical Exam   Constitutional: He is oriented to person, place, and time. He appears well-developed and  well-nourished.   HENT:   Head: Normocephalic.   Mouth/Throat: No oropharyngeal exudate.   Eyes: Pupils are equal, round, and reactive to light.   Neck: No thyromegaly present.   Cardiovascular: Normal rate, regular rhythm and normal heart sounds.  Exam reveals no gallop.    No murmur heard.  Pulmonary/Chest: No respiratory distress. He has no wheezes. He has no rales.   Abdominal: Soft. Bowel sounds are normal. He exhibits no distension and no mass. There is no rebound and no guarding.   Musculoskeletal: Normal range of motion. He exhibits no edema.   Lymphadenopathy:     He has no cervical adenopathy.   Neurological: He is alert and oriented to person, place, and time.   Skin: Skin is warm and dry.   Psychiatric: He has a normal mood and affect. His behavior is normal.       Wt Readings from Last 3 Encounters:   07/06/17 96.2 kg (212 lb)   07/06/17 96.2 kg (212 lb)   05/30/17 96.2 kg (212 lb 1.3 oz)     Temp Readings from Last 3 Encounters:   09/15/17 96 °F (35.6 °C)   05/30/17 97.5 °F (36.4 °C) (Oral)   05/01/17 97.5 °F (36.4 °C) (Oral)     BP Readings from Last 3 Encounters:   09/15/17 110/64   07/06/17 110/62   05/30/17 (!) 108/46     Pulse Readings from Last 3 Encounters:   09/15/17 92   07/06/17 81   05/30/17 68       Assessment:       1. Multiple myeloma not having achieved remission    2. Vitamin D deficiency disease      3-thrombocytopenia  Plan:       Lab Results   Component Value Date    WBC 7.30 09/15/2017    HGB 11.5 (L) 09/15/2017    HCT 37.5 (L) 09/15/2017     (H) 09/15/2017     (L) 09/15/2017     CMP/SPEP and free light chains pending    See me in 4 weeks with a cbc,, cmp and SPEp/free lioght chains.  Platelets are 138K, slghtly below normal, we will monitor. Likely due to revlimid  b12 today and in 4 weeeks     He restarted revlimid since Spetember 5. Continue revlimid  Stress score 4   No need for intervention

## 2017-09-18 LAB
ALBUMIN SERPL ELPH-MCNC: 3.18 G/DL
ALPHA1 GLOB SERPL ELPH-MCNC: 0.4 G/DL
ALPHA2 GLOB SERPL ELPH-MCNC: 0.9 G/DL
B-GLOBULIN SERPL ELPH-MCNC: 1 G/DL
GAMMA GLOB SERPL ELPH-MCNC: 1.13 G/DL
KAPPA LC SER QL IA: 3.95 MG/DL
KAPPA LC/LAMBDA SER IA: 0.44
LAMBDA LC SER QL IA: 9 MG/DL
PATHOLOGIST INTERPRETATION SPE: NORMAL
PROT SERPL-MCNC: 6.6 G/DL

## 2017-09-19 DIAGNOSIS — F41.9 ANXIETY: ICD-10-CM

## 2017-09-19 RX ORDER — CLONAZEPAM 0.5 MG/1
TABLET ORAL
Qty: 60 TABLET | Refills: 0 | Status: CANCELLED | OUTPATIENT
Start: 2017-09-19

## 2017-09-19 RX ORDER — CLONAZEPAM 0.5 MG/1
0.5 TABLET ORAL DAILY
Qty: 30 TABLET | Refills: 0 | Status: SHIPPED | OUTPATIENT
Start: 2017-09-19 | End: 2017-10-16 | Stop reason: SDUPTHER

## 2017-10-04 RX ORDER — SPIRONOLACTONE 50 MG/1
TABLET, FILM COATED ORAL
Qty: 90 TABLET | Refills: 0 | OUTPATIENT
Start: 2017-10-04

## 2017-10-05 DIAGNOSIS — C90.00 MULTIPLE MYELOMA NOT HAVING ACHIEVED REMISSION: ICD-10-CM

## 2017-10-05 RX ORDER — LENALIDOMIDE 10 MG/1
CAPSULE ORAL
Qty: 21 EACH | Refills: 0 | Status: SHIPPED | OUTPATIENT
Start: 2017-10-05 | End: 2017-10-12 | Stop reason: SDUPTHER

## 2017-10-05 NOTE — TELEPHONE ENCOUNTER
----- Message from Sarah Washington sent at 10/5/2017 10:49 AM CDT -----  Contact: pt  He's calling to get a refill...    1. What is the name of the medication you are requesting? Revlimid  2. What is the dose? 10 mg  3. How do you take the medication? Orally, topically, etc? orally  4. How often do you take this medication? Once a day  5. Do you need a 30 day or 90 day supply? 30-day  6. How many refills are you requesting? 1  7. What is your preferred pharmacy and location of the pharmacy? [facility]  8. Who can we contact with further questions? [phone]

## 2017-10-12 ENCOUNTER — TELEPHONE (OUTPATIENT)
Dept: HEMATOLOGY/ONCOLOGY | Facility: CLINIC | Age: 82
End: 2017-10-12

## 2017-10-12 DIAGNOSIS — C90.00 MULTIPLE MYELOMA NOT HAVING ACHIEVED REMISSION: ICD-10-CM

## 2017-10-12 DIAGNOSIS — E55.9 VITAMIN D DEFICIENCY DISEASE: ICD-10-CM

## 2017-10-12 RX ORDER — LENALIDOMIDE 10 MG/1
CAPSULE ORAL
Qty: 21 EACH | Refills: 0 | Status: SHIPPED | OUTPATIENT
Start: 2017-10-12 | End: 2017-10-30 | Stop reason: SDUPTHER

## 2017-10-12 RX ORDER — ERGOCALCIFEROL 1.25 MG/1
CAPSULE ORAL
Qty: 4 CAPSULE | Refills: 3 | Status: SHIPPED | OUTPATIENT
Start: 2017-10-12 | End: 2017-11-06 | Stop reason: SDUPTHER

## 2017-10-12 NOTE — TELEPHONE ENCOUNTER
----- Message from Hebert Olivera sent at 10/12/2017  9:16 AM CDT -----  Contact: Pt-wife Елена Sams   Pt-wife Елена Sams called and requested appointment be changed to tomorrow checked times nothing was available for tomorrow callback number is...[phone]

## 2017-10-16 DIAGNOSIS — C90.00 MULTIPLE MYELOMA NOT HAVING ACHIEVED REMISSION: ICD-10-CM

## 2017-10-16 DIAGNOSIS — F41.9 ANXIETY: ICD-10-CM

## 2017-10-16 RX ORDER — CLONAZEPAM 0.5 MG/1
0.5 TABLET ORAL DAILY
Qty: 30 TABLET | Refills: 0 | Status: SHIPPED | OUTPATIENT
Start: 2017-10-16 | End: 2017-12-13 | Stop reason: SDUPTHER

## 2017-10-16 RX ORDER — CLONAZEPAM 0.5 MG/1
0.5 TABLET ORAL DAILY
Qty: 30 TABLET | Refills: 0 | Status: SHIPPED | OUTPATIENT
Start: 2017-10-16 | End: 2017-10-16 | Stop reason: SDUPTHER

## 2017-10-16 RX ORDER — OXYCODONE AND ACETAMINOPHEN 10; 325 MG/1; MG/1
1 TABLET ORAL EVERY 6 HOURS PRN
Qty: 60 TABLET | Refills: 0 | Status: SHIPPED | OUTPATIENT
Start: 2017-10-16 | End: 2017-11-16 | Stop reason: SDUPTHER

## 2017-10-16 RX ORDER — OXYCODONE AND ACETAMINOPHEN 10; 325 MG/1; MG/1
1 TABLET ORAL EVERY 6 HOURS PRN
Qty: 60 TABLET | Refills: 0 | Status: SHIPPED | OUTPATIENT
Start: 2017-10-16 | End: 2017-10-16 | Stop reason: SDUPTHER

## 2017-10-16 NOTE — TELEPHONE ENCOUNTER
----- Message from Sarah Washington sent at 10/16/2017 10:40 AM CDT -----  Contact: pt's wife  She's calling to speak to someone about pt's health, would not give details, please advise [phone] (home)

## 2017-10-16 NOTE — TELEPHONE ENCOUNTER
Spoke with wife patient needs prescription for klonopin and percocet.  They will pick it up tomorrow.

## 2017-10-26 ENCOUNTER — OFFICE VISIT (OUTPATIENT)
Dept: PULMONOLOGY | Facility: CLINIC | Age: 82
End: 2017-10-26
Payer: MEDICARE

## 2017-10-26 ENCOUNTER — OFFICE VISIT (OUTPATIENT)
Dept: HEMATOLOGY/ONCOLOGY | Facility: CLINIC | Age: 82
End: 2017-10-26
Payer: MEDICARE

## 2017-10-26 ENCOUNTER — LAB VISIT (OUTPATIENT)
Dept: LAB | Facility: HOSPITAL | Age: 82
End: 2017-10-26
Attending: INTERNAL MEDICINE
Payer: MEDICARE

## 2017-10-26 ENCOUNTER — INFUSION (OUTPATIENT)
Dept: INFUSION THERAPY | Facility: HOSPITAL | Age: 82
End: 2017-10-26
Attending: INTERNAL MEDICINE
Payer: MEDICARE

## 2017-10-26 VITALS
HEART RATE: 64 BPM | WEIGHT: 209.19 LBS | RESPIRATION RATE: 18 BRPM | DIASTOLIC BLOOD PRESSURE: 62 MMHG | OXYGEN SATURATION: 77 % | TEMPERATURE: 98 F | SYSTOLIC BLOOD PRESSURE: 120 MMHG | HEIGHT: 67 IN | BODY MASS INDEX: 32.83 KG/M2

## 2017-10-26 VITALS
HEIGHT: 67 IN | WEIGHT: 209 LBS | BODY MASS INDEX: 32.8 KG/M2 | SYSTOLIC BLOOD PRESSURE: 120 MMHG | DIASTOLIC BLOOD PRESSURE: 44 MMHG | OXYGEN SATURATION: 74 % | HEART RATE: 60 BPM

## 2017-10-26 DIAGNOSIS — J44.1 COPD EXACERBATION: Primary | ICD-10-CM

## 2017-10-26 DIAGNOSIS — J44.9 CHRONIC OBSTRUCTIVE PULMONARY DISEASE, UNSPECIFIED COPD TYPE: ICD-10-CM

## 2017-10-26 DIAGNOSIS — D53.9 MACROCYTIC ANEMIA: Primary | ICD-10-CM

## 2017-10-26 DIAGNOSIS — R11.0 NAUSEA: ICD-10-CM

## 2017-10-26 DIAGNOSIS — D51.8 OTHER VITAMIN B12 DEFICIENCY ANEMIA: Primary | ICD-10-CM

## 2017-10-26 DIAGNOSIS — J84.10 PULMONARY FIBROSIS, POSTINFLAMMATORY: ICD-10-CM

## 2017-10-26 DIAGNOSIS — C90.01 MULTIPLE MYELOMA IN REMISSION: ICD-10-CM

## 2017-10-26 DIAGNOSIS — C90.00 MULTIPLE MYELOMA NOT HAVING ACHIEVED REMISSION: ICD-10-CM

## 2017-10-26 LAB
ALBUMIN SERPL BCP-MCNC: 3 G/DL
ALP SERPL-CCNC: 77 U/L
ALT SERPL W/O P-5'-P-CCNC: 12 U/L
ANION GAP SERPL CALC-SCNC: 11 MMOL/L
AST SERPL-CCNC: 12 U/L
BASOPHILS # BLD AUTO: 0.04 K/UL
BASOPHILS NFR BLD: 0.9 %
BILIRUB SERPL-MCNC: 0.6 MG/DL
BUN SERPL-MCNC: 14 MG/DL
CALCIUM SERPL-MCNC: 9.2 MG/DL
CHLORIDE SERPL-SCNC: 95 MMOL/L
CO2 SERPL-SCNC: 30 MMOL/L
CREAT SERPL-MCNC: 1.2 MG/DL
DIFFERENTIAL METHOD: ABNORMAL
EOSINOPHIL # BLD AUTO: 0.3 K/UL
EOSINOPHIL NFR BLD: 6.7 %
ERYTHROCYTE [DISTWIDTH] IN BLOOD BY AUTOMATED COUNT: 17.6 %
EST. GFR  (AFRICAN AMERICAN): >60 ML/MIN/1.73 M^2
EST. GFR  (NON AFRICAN AMERICAN): 56 ML/MIN/1.73 M^2
GLUCOSE SERPL-MCNC: 139 MG/DL
HCT VFR BLD AUTO: 30.9 %
HGB BLD-MCNC: 9.7 G/DL
LYMPHOCYTES # BLD AUTO: 1.1 K/UL
LYMPHOCYTES NFR BLD: 24.9 %
MCH RBC QN AUTO: 31.8 PG
MCHC RBC AUTO-ENTMCNC: 31.4 G/DL
MCV RBC AUTO: 101 FL
MONOCYTES # BLD AUTO: 0.9 K/UL
MONOCYTES NFR BLD: 21.1 %
NEUTROPHILS # BLD AUTO: 2.1 K/UL
NEUTROPHILS NFR BLD: 46.4 %
PLATELET # BLD AUTO: 118 K/UL
PMV BLD AUTO: 10.5 FL
POTASSIUM SERPL-SCNC: 4.2 MMOL/L
PROT SERPL-MCNC: 7.2 G/DL
RBC # BLD AUTO: 3.05 M/UL
SODIUM SERPL-SCNC: 136 MMOL/L
WBC # BLD AUTO: 4.45 K/UL

## 2017-10-26 PROCEDURE — 80053 COMPREHEN METABOLIC PANEL: CPT | Mod: PO

## 2017-10-26 PROCEDURE — 84165 PROTEIN E-PHORESIS SERUM: CPT

## 2017-10-26 PROCEDURE — 99215 OFFICE O/P EST HI 40 MIN: CPT | Mod: S$GLB,,, | Performed by: INTERNAL MEDICINE

## 2017-10-26 PROCEDURE — 84165 PROTEIN E-PHORESIS SERUM: CPT | Mod: 26,,, | Performed by: PATHOLOGY

## 2017-10-26 PROCEDURE — 63600175 PHARM REV CODE 636 W HCPCS: Mod: PO | Performed by: INTERNAL MEDICINE

## 2017-10-26 PROCEDURE — 99499 UNLISTED E&M SERVICE: CPT | Mod: S$GLB,,, | Performed by: INTERNAL MEDICINE

## 2017-10-26 PROCEDURE — 99215 OFFICE O/P EST HI 40 MIN: CPT | Mod: 25,S$GLB,, | Performed by: INTERNAL MEDICINE

## 2017-10-26 PROCEDURE — 85025 COMPLETE CBC W/AUTO DIFF WBC: CPT | Mod: PO

## 2017-10-26 PROCEDURE — 96372 THER/PROPH/DIAG INJ SC/IM: CPT | Mod: PO

## 2017-10-26 PROCEDURE — 99999 PR PBB SHADOW E&M-EST. PATIENT-LVL V: CPT | Mod: PBBFAC,,, | Performed by: INTERNAL MEDICINE

## 2017-10-26 PROCEDURE — 96372 THER/PROPH/DIAG INJ SC/IM: CPT | Mod: S$GLB,,, | Performed by: INTERNAL MEDICINE

## 2017-10-26 PROCEDURE — 83520 IMMUNOASSAY QUANT NOS NONAB: CPT

## 2017-10-26 PROCEDURE — 36415 COLL VENOUS BLD VENIPUNCTURE: CPT | Mod: PO

## 2017-10-26 RX ORDER — IPRATROPIUM BROMIDE AND ALBUTEROL SULFATE 2.5; .5 MG/3ML; MG/3ML
3 SOLUTION RESPIRATORY (INHALATION)
Qty: 360 VIAL | Refills: 3 | Status: SHIPPED | OUTPATIENT
Start: 2017-10-26 | End: 2018-03-02

## 2017-10-26 RX ORDER — ONDANSETRON 4 MG/1
4 TABLET, FILM COATED ORAL EVERY 8 HOURS PRN
Qty: 20 TABLET | Refills: 0 | Status: SHIPPED | OUTPATIENT
Start: 2017-10-26

## 2017-10-26 RX ORDER — CYANOCOBALAMIN 1000 UG/ML
1000 INJECTION, SOLUTION INTRAMUSCULAR; SUBCUTANEOUS
Status: DISCONTINUED | OUTPATIENT
Start: 2017-10-26 | End: 2017-10-26 | Stop reason: HOSPADM

## 2017-10-26 RX ORDER — LEVOFLOXACIN 500 MG/1
500 TABLET, FILM COATED ORAL DAILY
Qty: 10 TABLET | Refills: 1 | Status: SHIPPED | OUTPATIENT
Start: 2017-10-26 | End: 2018-03-21 | Stop reason: ALTCHOICE

## 2017-10-26 RX ORDER — TRIAMCINOLONE ACETONIDE 40 MG/ML
80 INJECTION, SUSPENSION INTRA-ARTICULAR; INTRAMUSCULAR
Status: COMPLETED | OUTPATIENT
Start: 2017-10-26 | End: 2017-10-26

## 2017-10-26 RX ORDER — PREDNISONE 20 MG/1
TABLET ORAL
Qty: 20 TABLET | Refills: 1 | Status: SHIPPED | OUTPATIENT
Start: 2017-10-26 | End: 2018-03-21 | Stop reason: ALTCHOICE

## 2017-10-26 RX ADMIN — TRIAMCINOLONE ACETONIDE 80 MG: 40 INJECTION, SUSPENSION INTRA-ARTICULAR; INTRAMUSCULAR at 04:10

## 2017-10-26 RX ADMIN — CYANOCOBALAMIN 1000 MCG: 1000 INJECTION, SOLUTION INTRAMUSCULAR at 02:10

## 2017-10-26 NOTE — PROGRESS NOTES
Subjective:       Patient ID: Lorne Roe is a 82 y.o. male.    Chief Complaint: No chief complaint on file.    HPI COPD  He presents for evaluation and treatment of COPD. The patient is currently having symptoms / an exacerbation. Current symptoms include acute dyspnea, chronic dyspnea, cough productive of white sputum in small amounts and wheezing. Symptoms have been present since several days ago and have been gradually worsening. He denies chills and fever. Associated symptoms include poor exercise tolerance and shortness of breath.  This episode appears to have been triggered by no identifiable factor. Treatments tried for the current exacerbation: albuterol inhaler. The patient has been having similar episodes for approximately 10 years.   He uses 1 pillows at night. Patient currently is not on home oxygen therapy.. The patient is having no constitutional symptoms, denying fever, chills, anorexia, or weight loss. The patient has been hospitalized for this condition before. He quit smoking approximately a number years ago. The patient is experiencing exercise intolerance (difficulty walking 1 blocks on flat ground).    Toe amputated 1 month ago at [facility]     Obstructive Sleep Apnea:  Patient is using CPAP as prescribed and benefiting from therapy.     Patient has complaints of none    Past Medical History:   Diagnosis Date    Acute coronary syndrome     B12 deficiency anemia     Carotid artery occlusion     CHF (congestive heart failure)     COPD (chronic obstructive pulmonary disease)     Coronary artery disease     s/p cabg, cardiac stents and MI Arley mcnair JA    Depression     Hyperlipidemia     Hypertension     Multiple myeloma     Osteopenia     Paraproteinemia     Dr alvarado    PVD (peripheral vascular disease)     Dr Argueta    Sleep apnea     Vitamin D deficiency disease      Past Surgical History:   Procedure Laterality Date    BACK  SURGERY      CAROTID ENDARTERECTOMY Left 13    left    CORONARY ANGIOPLASTY WITH STENT PLACEMENT      CORONARY ARTERY BYPASS GRAFT      stent/eren multi-link duet-safe to 1.5T    NECK SURGERY       Social History     Social History    Marital status:      Spouse name: N/A    Number of children: 0    Years of education: N/A     Occupational History    Not on file.     Social History Main Topics    Smoking status: Former Smoker    Smokeless tobacco: Never Used    Alcohol use No    Drug use: No    Sexual activity: No     Other Topics Concern    Not on file     Social History Narrative    Lives with spouse at home, disabled since      Review of Systems    Objective:      Physical Exam  Personal Diagnostic Review  Chest X-Ray: I personally reviewed the films and findings are:, mild interstital changes and post op changes   Radiology Result      Name:   :  Patient MRN:   Lorne Roe [id number]   Account Number: Room & Bed Accession Number:   [id number]   [id number]   Authorizing Physician: Patient Class: Diagnosis:   Majid Heidy IP- Inpatient     Procedure:  Exam Date: Reason for Exam:   X-Ray Chest PA And Lateral 2017 f/u LLL pneumonia          RESULTS:  Exam: XR CHEST PA AND LATERAL,     Date:  17 21:46:19     History: LLL pneumonia     Comparison:  Comparison with multiple prior studies including 2016, 2016, 2017 and 2017     Findings: Sternal wires are present.  Postop changes cervical spine.     Heart size is normal.     The right lung is clear.     There are chronic residual interstitial markings at the left lung base.  This pattern appears to be chronic.  The more pronounced atelectatic markings and pleural effusion evident in December have improved.  IMPRESSION:     Postoperative chest x-ray.  Persistent interstitial scarring left lung base with overall improvement since 2016 chest x-ray.        Electronically signed by:  SHIRAZ HART MD  Date:                                            03/19/17  Time:                                           21:49           Signed By:  Shiraz Hart MD on 3/19/2017  9:49 PM               Pulmonary function tests:  No recent  No flowsheet data found.    CT Chest High [id number]/13/2017  [facility]  Result Impression    Scattered mild pulmonary fibrotic type changes.  Emphysema.  Note:  All CT scans at this facility use dose modulation, iterative reconstruction,  and/or weight base dosing when appropriate to reduce radiation dose to as low  as reasonably achievable.   Result Narrative   EXAM:   CT CHEST WO CONTRAST, high-resolution  CLINICAL HISTORY:  Interstitial pulmonary disease.  Multiple myeloma.  COMPARISON:  Chest x-ray 5/8/2017  TECHNIQUE:  Noncontrast , high-resolution axial CT images of the chest were  obtained.  FINDINGS:   Pulmonary emphysema changes.  Scattered areas of subpleural  interstitial markings are present.  These markings would appear to correlate  with some idiopathic pulmonary fibrosis change.  These changes appear greater  in the lung bases bilaterally.  Linear area of suspected scarring in the anterior aspect of the right upper  lobe, image 6 series 2.  Scattered small mediastinal lymph nodes.  Aortic calcifications.  Sternotomy  wires.  Dictated and Electronically Signed By: John Wagner Jr MD on October 13, 20       Assessment:       1. COPD exacerbation    2. Chronic obstructive pulmonary disease, unspecified COPD type    3. Pulmonary fibrosis, postinflammatory        Outpatient Encounter Prescriptions as of 10/26/2017   Medication Sig Dispense Refill    acyclovir (ZOVIRAX) 400 MG tablet Take 1 tablet (400 mg total) by mouth 2 (two) times daily. 60 tablet 0    albuterol 90 mcg/actuation inhaler Inhale 2 puffs into the lungs every 6 (six) hours as needed for Shortness of Breath. 3 Inhaler 3    albuterol-ipratropium  2.5mg-0.5mg/3mL (DUO-NEB) 0.5 mg-3 mg(2.5 mg base)/3 mL nebulizer solution Take 3 mLs by nebulization every 6 (six) hours while awake. 360 vial 3    ammonium lactate (AMLACTIN) 12 % lotion Use daily.  Apply to damp skin after bathing. 225 g 11    aspirin (ECOTRIN) 81 MG EC tablet Take 1 tablet (81 mg total) by mouth once daily. 30 tablet 0    atenolol (TENORMIN) 50 MG tablet Take 50 mg by mouth once daily. 50mg in am and 25mg at night      buPROPion (WELLBUTRIN) 75 MG tablet Take 1 tablet (75 mg total) by mouth once daily. 90 tablet 3    clonazePAM (KLONOPIN) 0.5 MG tablet Take 1 tablet (0.5 mg total) by mouth once daily. 30 tablet 0    clopidogrel (PLAVIX) 75 mg tablet Take 1 tablet (75 mg total) by mouth once daily. 30 tablet 0    clotrimazole (LOTRIMIN) 1 % cream Apply topically as needed. 1 Tube 3    cyanocobalamin 1,000 mcg/mL injection Inject 1 mL (1,000 mcg total) into the muscle every 30 days. 10 mL 1    dexamethasone (DECADRON) 4 MG Tab 5 tablets po bid one day a week every week 40 tablet 6    docusate sodium (COLACE) 100 MG capsule Take 1 capsule (100 mg total) by mouth 2 (two) times daily. 60 capsule 0    ergocalciferol (VITAMIN D2) 50,000 unit Cap TAKE 1 CAPSULE EVERY 7 DAYS 4 capsule 3    fluticasone (FLONASE) 50 mcg/actuation nasal spray 1 spray by Each Nare route once daily. 1 Bottle 0    fluticasone-salmeterol 250-50 mcg/dose (ADVAIR DISKUS) 250-50 mcg/dose diskus inhaler Inhale 1 puff into the lungs 2 (two) times daily. 180 each 3    furosemide (LASIX) 40 MG tablet Take 1 tablet (40 mg total) by mouth 2 (two) times daily. 180 tablet 4    lenalidomide (REVLIMID) 10 mg Cap One tablet daily from day 1 to day 21 then 7 days off 21 each 0    levothyroxine (SYNTHROID) 25 MCG tablet Take 1 tablet (25 mcg total) by mouth once daily. 30 tablet 0    magnesium oxide (MAG-OX) 400 mg tablet Take 1 tablet (400 mg total) by mouth 2 (two) times daily. 60 tablet 2    meloxicam (MOBIC) 7.5 MG tablet  Take 7.5 mg by mouth.      midodrine (PROAMATINE) 2.5 MG Tab Take 2 tablets (5 mg total) by mouth 2 (two) times daily with meals. 60 tablet 0    nitroGLYCERIN (NITROSTAT) 0.4 MG SL tablet Place 1 tablet (0.4 mg total) under the tongue every 5 (five) minutes as needed. 30 tablet 3    nystatin (MYCOSTATIN) cream Apply topically 2 (two) times daily. 30 g 0    ondansetron (ZOFRAN) 4 MG tablet Take 1 tablet (4 mg total) by mouth every 8 (eight) hours as needed for Nausea. 20 tablet 0    oxycodone-acetaminophen (PERCOCET)  mg per tablet Take 1 tablet by mouth every 6 (six) hours as needed for Pain. 60 tablet 0    pantoprazole (PROTONIX) 40 MG tablet Take 1 tablet (40 mg total) by mouth once daily. 30 tablet 6    promethazine-dextromethorphan (PROMETHAZINE-DM) 6.25-15 mg/5 mL Syrp Take 5 mLs by mouth every 6 (six) hours as needed (x cough). 473 mL 3    rosuvastatin (CRESTOR) 10 MG tablet Take 1 tablet (10 mg total) by mouth every evening. 30 tablet 0    spironolactone (ALDACTONE) 50 MG tablet TAKE 1 TABLET ONCE DAILY 90 tablet 0    tamsulosin (FLOMAX) 0.4 mg Cp24 Take 1 capsule (0.4 mg total) by mouth once daily. 30 capsule 0    [DISCONTINUED] albuterol-ipratropium 2.5mg-0.5mg/3mL (DUO-NEB) 0.5 mg-3 mg(2.5 mg base)/3 mL nebulizer solution Take 3 mLs by nebulization every 6 (six) hours while awake. 360 vial 3    levoFLOXacin (LEVAQUIN) 500 MG tablet Take 1 tablet (500 mg total) by mouth once daily. 10 tablet 1    predniSONE (DELTASONE) 20 MG tablet Prednisone 60 mg/ day for 3 days, 40 mg/day for 3 days,20 mg/ day for 3 days, (1/2 tablet )10 mg a day for 3 days. 20 tablet 1    [DISCONTINUED] ondansetron (ZOFRAN) 4 MG tablet Take 1 tablet (4 mg total) by mouth every 8 (eight) hours as needed for Nausea. 20 tablet 0    [] triamcinolone acetonide injection 80 mg       [DISCONTINUED] cyanocobalamin injection 1,000 mcg        No facility-administered encounter medications on file as of 10/26/2017.       Orders Placed This Encounter   Procedures    X-Ray Chest PA And Lateral     Standing Status:   Future     Standing Expiration Date:   4/26/2019     Order Specific Question:   Reason for Exam:     Answer:   SOB     Plan:       Requested Prescriptions     Signed Prescriptions Disp Refills    predniSONE (DELTASONE) 20 MG tablet 20 tablet 1     Sig: Prednisone 60 mg/ day for 3 days, 40 mg/day for 3 days,20 mg/ day for 3 days, (1/2 tablet )10 mg a day for 3 days.    levoFLOXacin (LEVAQUIN) 500 MG tablet 10 tablet 1     Sig: Take 1 tablet (500 mg total) by mouth once daily.    albuterol-ipratropium 2.5mg-0.5mg/3mL (DUO-NEB) 0.5 mg-3 mg(2.5 mg base)/3 mL nebulizer solution 360 vial 3     Sig: Take 3 mLs by nebulization every 6 (six) hours while awake.     COPD exacerbation  -     triamcinolone acetonide injection 80 mg; Inject 2 mLs (80 mg total) into the muscle one time.  -     predniSONE (DELTASONE) 20 MG tablet; Prednisone 60 mg/ day for 3 days, 40 mg/day for 3 days,20 mg/ day for 3 days, (1/2 tablet )10 mg a day for 3 days.  Dispense: 20 tablet; Refill: 1  -     levoFLOXacin (LEVAQUIN) 500 MG tablet; Take 1 tablet (500 mg total) by mouth once daily.  Dispense: 10 tablet; Refill: 1  -     albuterol-ipratropium 2.5mg-0.5mg/3mL (DUO-NEB) 0.5 mg-3 mg(2.5 mg base)/3 mL nebulizer solution; Take 3 mLs by nebulization every 6 (six) hours while awake.  Dispense: 360 vial; Refill: 3    Chronic obstructive pulmonary disease, unspecified COPD type  -     albuterol-ipratropium 2.5mg-0.5mg/3mL (DUO-NEB) 0.5 mg-3 mg(2.5 mg base)/3 mL nebulizer solution; Take 3 mLs by nebulization every 6 (six) hours while awake.  Dispense: 360 vial; Refill: 3  -     X-Ray Chest PA And Lateral; Future; Expected date: 10/26/2017    Pulmonary fibrosis, postinflammatory      Return in about 2 weeks (around 11/8/2017) for CXR.    .gmgmgm4      MEDICAL DECISION MAKING: Moderate to high complexity.  Overall, the multiple problems listed are of  moderate to high severity that may impact quality of life and activities of daily living. Side effects of medications, treatment plan as well as options and alternatives reviewed and discussed with patient. There was counseling of patient concerning these issues.    Total time spent in face to face counseling and coordination of care - 40 minutes over 50% of time was used in discussion of prognosis, risks, benefits of treatment, instructions and compliance with regimen . Discussion with other physicians or health care providers (homehealth, durable medical equipment providers).

## 2017-10-26 NOTE — PROGRESS NOTES
Subjective:       Patient ID: Lorne Roe is a 82 y.o. male.    Chief Complaint: Follow-up    HPI This is a 81 year-old  gentleman who comes today for follow up of his multiple. myeloma.  He is   known to us since 11/2008 because of mild anemia. Work up at that time showed vitamin B12 in the lower limits of normal. He was given vitamin B12 supplements once a month with improvement. In addition, a serum protein electrophoresis was reported as positive showing an IgA monoclonal spike of 1.18 g. Eventually it sisi to 1.3 g. He had x-rays of the skull and other bones all of which were negative. His UPEP was negative for a monoclonal spike. His chem 20 as normal and has remained with normal calcium, protein and creatinine tthorugh the years. He had a bone marrow aspiration and biopsy on 05/03/2010 because of his increase in the spike of serum protein electrophoresis. 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    Serum free light chains in 07/2009 were reported as increased mostly at the expense of the lambda chains. There were measured at 7.13. We felt that based on the absence of lytic lesions, hypercalcemia, renal failure or significant anemia, he had at best a smoldering myeloma that we could follow expectantly. \He has chronic back pain of the spine    Imaging studies on 03/17/2011 ordered by orthopedics showed some disk disease and degenerative joint disease but no evidence of myeloma.    H A repeat MRI of the LS spine on 8/2014 showed DJD changes and no obvious evidence of myeloma.  Back pain continues about the same     He has chronic SOB for which he follows up with dr Szymanski and is on home 02.  His lab test in October 2016 show anemia of  10.8  calcium is normal, total Serum protein WAS 10.  SPEp showed a monoclonal spike of 4.82 gr with lambda free light chains at 108 hE had a small monoclonal pspike represetning 12% of the protein in the urine  He was asked to  have a bone marrow. He was diagnosed as having active myeloma.> Biopsy was done at the [facility]. Report is in the MR MRdia section of EPIC.  Cytogenetics were ported as follows: 46XY, alonso(3), t(3;3), (P25, q21) , (15/46 XY (5)     He also had a positive T cell gene rearangement which raised the possibility of a concomitant T cell disorder. It was felt ,yeloma was the leading issue, and to treat him for it.  He just came out of the hospital after a prolonged series ofback to back admissions to the [facility], [facility], and the [facility].  He has responded nicely to the initial treatment and dwas placed  on maintenance revlimid 10 mg po d1-21 q28 days.  P  He came out from a prolonged admission at [facility] due to osteomyelitis of oen the toes requriing amputation of the toe.  He is off antibioticsHis primary care is now at [facility].  He gets b12 Monthly injections at our office He remains SOb on around the clock 02  ALLERGIES: see med card  MEDICATIONS: See Med Card.  PAST MEDICAL HISTORY:  1. Smoldering multiple myeloma.      2. Obesity.  3. Chronic back pain with degenerative disk disease.  4. Coronary artery disease, status post bypass and coronary stents and  previous heart attacks.  5. High blood pressure.  6. Elevated cholesterol.  7. Peripheral vascular disease -- carotid stenosis.  8. History of leg edema.  9. Arthritis.  10.COPD  11.b12  deficiency  Review of Systems   Constitutional: Positive for appetite change. Negative for fatigue and unexpected weight change.   Eyes: Negative.  Negative for visual disturbance.   Respiratory: Positive for shortness of breath and wheezing. Negative for cough.    Cardiovascular: Negative.  Negative for chest pain.   Gastrointestinal: Positive for nausea. Negative for abdominal pain and diarrhea.   Genitourinary: Positive for frequency. Negative for hematuria.   Musculoskeletal: Positive for back pain.   Skin: Positive for rash.   Neurological: Positive  for headaches.   Hematological: Negative for adenopathy.   Psychiatric/Behavioral: Negative.  The patient is not nervous/anxious.        Objective:      Physical Exam   Constitutional: He is oriented to person, place, and time. He appears well-developed and well-nourished.   HENT:   Head: Normocephalic.   Mouth/Throat: No oropharyngeal exudate.   Eyes: Pupils are equal, round, and reactive to light.   Neck: No thyromegaly present.   Cardiovascular: Normal rate, regular rhythm and normal heart sounds.  Exam reveals no gallop.    No murmur heard.  Pulmonary/Chest: No respiratory distress. He has no wheezes. He has no rales.   Abdominal: Soft. Bowel sounds are normal. He exhibits no distension and no mass. There is no rebound and no guarding.   Musculoskeletal: Normal range of motion. He exhibits no edema.   Lymphadenopathy:     He has no cervical adenopathy.   Neurological: He is alert and oriented to person, place, and time.   Skin: Skin is warm and dry.   Psychiatric: He has a normal mood and affect. His behavior is normal.       Wt Readings from Last 3 Encounters:   10/26/17 94.9 kg (209 lb 3.5 oz)   07/06/17 96.2 kg (212 lb)   07/06/17 96.2 kg (212 lb)     Temp Readings from Last 3 Encounters:   10/26/17 98 °F (36.7 °C) (Oral)   09/15/17 96 °F (35.6 °C)   05/30/17 97.5 °F (36.4 °C) (Oral)     BP Readings from Last 3 Encounters:   10/26/17 120/62   09/15/17 110/64   07/06/17 110/62     Pulse Readings from Last 3 Encounters:   10/26/17 64   09/15/17 92   07/06/17 81     0 sat: 77% on 3 liters x minute    Assessment:       1. Macrocytic anemia    2. Nausea        Plan:       Lab Results   Component Value Date    WBC 4.45 10/26/2017    HGB 9.7 (L) 10/26/2017    HCT 30.9 (L) 10/26/2017     (H) 10/26/2017     (L) 10/26/2017     Lab Results   Component Value Date    CREATININE 0.8 09/15/2017     Lab Results   Component Value Date    ALT 7 (L) 09/15/2017    AST 11 09/15/2017    ALKPHOS 85 09/15/2017     BILITOT 0.6 09/15/2017       1-Anemia: his  hemoglobin ahs gone down. We bk order b12/folate, ferritin and tibc. Anemia has macrocytic indexes. B12 today  He will see me in 4 weeks with a cbc, cmp, SPEP and free light chains  Will monitor  3-multi[le myeloma: has  had good results so far.  4-Hypoxemia: he is no distress but his 02 sat is 77% in spite of being on oxygen. Accompaning relatives tell me he ahd a CT scan done outside the clinic a few weeks ago and it was non contributory.  Will ass to see out Pulmonologist  Very complicated visit requiring addressing multiple complex issues. 45 minute spent with patient, 95% coordinating care  Zofran for nausea prescribed

## 2017-10-27 LAB
ALBUMIN SERPL ELPH-MCNC: 3.08 G/DL
ALPHA1 GLOB SERPL ELPH-MCNC: 0.47 G/DL
ALPHA2 GLOB SERPL ELPH-MCNC: 0.94 G/DL
B-GLOBULIN SERPL ELPH-MCNC: 1.05 G/DL
GAMMA GLOB SERPL ELPH-MCNC: 1.16 G/DL
KAPPA LC SER QL IA: 5.27 MG/DL
KAPPA LC/LAMBDA SER IA: 0.48
LAMBDA LC SER QL IA: 10.88 MG/DL
PATHOLOGIST INTERPRETATION SPE: NORMAL
PROT SERPL-MCNC: 6.7 G/DL

## 2017-10-29 PROBLEM — I50.30 DIASTOLIC CHF: Status: ACTIVE | Noted: 2017-03-17

## 2017-10-29 PROBLEM — J96.10 CHRONIC RESPIRATORY FAILURE: Status: ACTIVE | Noted: 2017-02-02

## 2017-10-29 PROBLEM — I21.A1 NON-ST ELEVATION MYOCARDIAL INFARCTION (NSTEMI), TYPE 2: Status: ACTIVE | Noted: 2017-08-15

## 2017-10-29 PROBLEM — I25.2 HISTORY OF MYOCARDIAL INFARCTION: Status: ACTIVE | Noted: 2017-08-23

## 2017-10-29 PROBLEM — M86.10 ACUTE OSTEOMYELITIS: Status: ACTIVE | Noted: 2017-08-14

## 2017-10-29 PROBLEM — J84.10 PULMONARY FIBROSIS, POSTINFLAMMATORY: Status: ACTIVE | Noted: 2017-10-29

## 2017-10-29 PROBLEM — I48.0 PAROXYSMAL ATRIAL FIBRILLATION: Status: ACTIVE | Noted: 2017-08-23

## 2017-10-29 PROCEDURE — 99499 UNLISTED E&M SERVICE: CPT | Mod: S$GLB,,, | Performed by: FAMILY MEDICINE

## 2017-10-30 DIAGNOSIS — C90.00 MULTIPLE MYELOMA NOT HAVING ACHIEVED REMISSION: ICD-10-CM

## 2017-10-30 RX ORDER — LENALIDOMIDE 10 MG/1
CAPSULE ORAL
Qty: 21 EACH | Refills: 0 | Status: SHIPPED | OUTPATIENT
Start: 2017-10-30 | End: 2017-11-22 | Stop reason: SDUPTHER

## 2017-10-30 NOTE — TELEPHONE ENCOUNTER
----- Message from Racheal Robledo sent at 10/30/2017  2:24 PM CDT -----  Patient calling to speak to nurse regarding his medication. Please adv/call [phone].//thanks. cw

## 2017-11-06 DIAGNOSIS — E55.9 VITAMIN D DEFICIENCY DISEASE: ICD-10-CM

## 2017-11-06 RX ORDER — ERGOCALCIFEROL 1.25 MG/1
CAPSULE ORAL
Qty: 4 CAPSULE | Refills: 0 | Status: SHIPPED | OUTPATIENT
Start: 2017-11-06 | End: 2017-11-16 | Stop reason: SDUPTHER

## 2017-11-08 ENCOUNTER — TELEPHONE (OUTPATIENT)
Dept: PULMONOLOGY | Facility: CLINIC | Age: 82
End: 2017-11-08

## 2017-11-16 DIAGNOSIS — E55.9 VITAMIN D DEFICIENCY DISEASE: ICD-10-CM

## 2017-11-16 DIAGNOSIS — C90.00 MULTIPLE MYELOMA NOT HAVING ACHIEVED REMISSION: ICD-10-CM

## 2017-11-16 RX ORDER — ERGOCALCIFEROL 1.25 MG/1
50000 CAPSULE ORAL
Qty: 4 CAPSULE | Refills: 0 | Status: SHIPPED | OUTPATIENT
Start: 2017-11-16

## 2017-11-16 RX ORDER — OXYCODONE AND ACETAMINOPHEN 10; 325 MG/1; MG/1
1 TABLET ORAL EVERY 6 HOURS PRN
Qty: 60 TABLET | Refills: 0 | Status: SHIPPED | OUTPATIENT
Start: 2017-11-16 | End: 2018-01-16 | Stop reason: SDUPTHER

## 2017-11-16 NOTE — TELEPHONE ENCOUNTER
----- Message from Donta Vo sent at 11/16/2017 10:57 AM CST -----  Contact: pt's spouse  1. What is the name of the medication you are requesting? Percacet & Viti D  2. What is the dose? 90 mg & 5000 mg  3. How do you take the medication? Orally, topically, etc? Orally  4. How often do you take this medication? 3 times daily  (PRN) & once daily  5. Do you need a 30 day or 90 day supply? 90  6. How many refills are you requesting? 4  7. What is your preferred pharmacy and location of the pharmacy? N/A  8. Who can we contact with further questions? [phone] (home)     Pt is out of his medications, please call pt when RX is ready ...

## 2017-11-22 DIAGNOSIS — C90.00 MULTIPLE MYELOMA NOT HAVING ACHIEVED REMISSION: ICD-10-CM

## 2017-11-22 RX ORDER — LENALIDOMIDE 10 MG/1
CAPSULE ORAL
Qty: 21 EACH | Refills: 0 | Status: SHIPPED | OUTPATIENT
Start: 2017-11-22 | End: 2017-12-20 | Stop reason: SDUPTHER

## 2017-11-27 ENCOUNTER — TELEPHONE (OUTPATIENT)
Dept: HEMATOLOGY/ONCOLOGY | Facility: CLINIC | Age: 82
End: 2017-11-27

## 2017-11-27 NOTE — TELEPHONE ENCOUNTER
----- Message from Nora Stevenson sent at 11/27/2017  7:24 AM CST -----  Contact: self [phone]  States that he would like to speak to nurse regarding changing his appt for today to a different time and also to see if rx for chemo medication was sent to pharm. Please call back at [phone]//thank you acc

## 2017-12-01 ENCOUNTER — LAB VISIT (OUTPATIENT)
Dept: LAB | Facility: HOSPITAL | Age: 82
End: 2017-12-01
Attending: INTERNAL MEDICINE
Payer: MEDICARE

## 2017-12-01 ENCOUNTER — OFFICE VISIT (OUTPATIENT)
Dept: HEMATOLOGY/ONCOLOGY | Facility: CLINIC | Age: 82
End: 2017-12-01
Payer: MEDICARE

## 2017-12-01 ENCOUNTER — INFUSION (OUTPATIENT)
Dept: INFUSION THERAPY | Facility: HOSPITAL | Age: 82
End: 2017-12-01
Attending: INTERNAL MEDICINE
Payer: MEDICARE

## 2017-12-01 VITALS
RESPIRATION RATE: 18 BRPM | BODY MASS INDEX: 31.59 KG/M2 | HEART RATE: 56 BPM | WEIGHT: 201.75 LBS | OXYGEN SATURATION: 93 % | SYSTOLIC BLOOD PRESSURE: 126 MMHG | TEMPERATURE: 98 F | DIASTOLIC BLOOD PRESSURE: 53 MMHG

## 2017-12-01 DIAGNOSIS — D53.9 MACROCYTIC ANEMIA: ICD-10-CM

## 2017-12-01 DIAGNOSIS — R11.0 NAUSEA: ICD-10-CM

## 2017-12-01 DIAGNOSIS — D51.8 OTHER VITAMIN B12 DEFICIENCY ANEMIA: ICD-10-CM

## 2017-12-01 DIAGNOSIS — C90.01 MULTIPLE MYELOMA IN REMISSION: Primary | ICD-10-CM

## 2017-12-01 DIAGNOSIS — D51.8 OTHER VITAMIN B12 DEFICIENCY ANEMIA: Primary | ICD-10-CM

## 2017-12-01 PROBLEM — D51.9 B12 DEFICIENCY ANEMIA: Status: ACTIVE | Noted: 2017-12-01

## 2017-12-01 LAB
ALBUMIN SERPL BCP-MCNC: 3.5 G/DL
ALP SERPL-CCNC: 89 U/L
ALT SERPL W/O P-5'-P-CCNC: 13 U/L
ANION GAP SERPL CALC-SCNC: 5 MMOL/L
AST SERPL-CCNC: 11 U/L
BASOPHILS # BLD AUTO: 0.04 K/UL
BASOPHILS NFR BLD: 0.6 %
BILIRUB SERPL-MCNC: 0.5 MG/DL
BUN SERPL-MCNC: 18 MG/DL
CALCIUM SERPL-MCNC: 9.8 MG/DL
CHLORIDE SERPL-SCNC: 100 MMOL/L
CO2 SERPL-SCNC: 32 MMOL/L
CREAT SERPL-MCNC: 1 MG/DL
DIFFERENTIAL METHOD: ABNORMAL
EOSINOPHIL # BLD AUTO: 0.2 K/UL
EOSINOPHIL NFR BLD: 3.4 %
ERYTHROCYTE [DISTWIDTH] IN BLOOD BY AUTOMATED COUNT: 18.8 %
EST. GFR  (AFRICAN AMERICAN): >60 ML/MIN/1.73 M^2
EST. GFR  (NON AFRICAN AMERICAN): >60 ML/MIN/1.73 M^2
GLUCOSE SERPL-MCNC: 113 MG/DL
HCT VFR BLD AUTO: 36.1 %
HGB BLD-MCNC: 11.5 G/DL
IGA SERPL-MCNC: 544 MG/DL
IGG SERPL-MCNC: 774 MG/DL
IGM SERPL-MCNC: 26 MG/DL
LYMPHOCYTES # BLD AUTO: 1.2 K/UL
LYMPHOCYTES NFR BLD: 17.8 %
MCH RBC QN AUTO: 32.3 PG
MCHC RBC AUTO-ENTMCNC: 31.9 G/DL
MCV RBC AUTO: 101 FL
MONOCYTES # BLD AUTO: 1.4 K/UL
MONOCYTES NFR BLD: 22.2 %
NEUTROPHILS # BLD AUTO: 3.6 K/UL
NEUTROPHILS NFR BLD: 56 %
PLATELET # BLD AUTO: 144 K/UL
PMV BLD AUTO: 9.1 FL
POTASSIUM SERPL-SCNC: 4.5 MMOL/L
PROT SERPL-MCNC: 7.3 G/DL
RBC # BLD AUTO: 3.56 M/UL
SODIUM SERPL-SCNC: 137 MMOL/L
WBC # BLD AUTO: 6.45 K/UL

## 2017-12-01 PROCEDURE — 99999 PR PBB SHADOW E&M-EST. PATIENT-LVL IV: CPT | Mod: PBBFAC,,, | Performed by: INTERNAL MEDICINE

## 2017-12-01 PROCEDURE — 86334 IMMUNOFIX E-PHORESIS SERUM: CPT

## 2017-12-01 PROCEDURE — 36415 COLL VENOUS BLD VENIPUNCTURE: CPT | Mod: PO

## 2017-12-01 PROCEDURE — 96372 THER/PROPH/DIAG INJ SC/IM: CPT | Mod: PO

## 2017-12-01 PROCEDURE — 84165 PROTEIN E-PHORESIS SERUM: CPT

## 2017-12-01 PROCEDURE — 82784 ASSAY IGA/IGD/IGG/IGM EACH: CPT | Mod: 59

## 2017-12-01 PROCEDURE — 84165 PROTEIN E-PHORESIS SERUM: CPT | Mod: 26,,, | Performed by: PATHOLOGY

## 2017-12-01 PROCEDURE — 99499 UNLISTED E&M SERVICE: CPT | Mod: S$GLB,,, | Performed by: INTERNAL MEDICINE

## 2017-12-01 PROCEDURE — 85025 COMPLETE CBC W/AUTO DIFF WBC: CPT | Mod: PO

## 2017-12-01 PROCEDURE — 86334 IMMUNOFIX E-PHORESIS SERUM: CPT | Mod: 26,,, | Performed by: PATHOLOGY

## 2017-12-01 PROCEDURE — 99214 OFFICE O/P EST MOD 30 MIN: CPT | Mod: S$GLB,,, | Performed by: INTERNAL MEDICINE

## 2017-12-01 PROCEDURE — 80053 COMPREHEN METABOLIC PANEL: CPT | Mod: PO

## 2017-12-01 PROCEDURE — 63600175 PHARM REV CODE 636 W HCPCS: Mod: PO | Performed by: INTERNAL MEDICINE

## 2017-12-01 RX ORDER — CYANOCOBALAMIN 1000 UG/ML
1000 INJECTION, SOLUTION INTRAMUSCULAR; SUBCUTANEOUS
Status: CANCELLED | OUTPATIENT
Start: 2017-12-01

## 2017-12-01 RX ORDER — CYANOCOBALAMIN 1000 UG/ML
1000 INJECTION, SOLUTION INTRAMUSCULAR; SUBCUTANEOUS
Status: DISCONTINUED | OUTPATIENT
Start: 2017-12-01 | End: 2017-12-01 | Stop reason: HOSPADM

## 2017-12-01 RX ADMIN — CYANOCOBALAMIN 1000 MCG: 1000 INJECTION, SOLUTION INTRAMUSCULAR at 01:12

## 2017-12-01 NOTE — PROGRESS NOTES
Subjective:       Patient ID: Lorne Roe is a 82 y.o. male.    Chief Complaint: No chief complaint on file.    HPI  This is a 81 year-old  gentleman who comes today for follow up of his multiple. myeloma.  He is   known to us since 11/2008 because of mild anemia. Work up at that time showed vitamin B12 in the lower limits of normal. He was given vitamin B12 supplements once a month with improvement. In addition, a serum protein electrophoresis was reported as positive showing an IgA monoclonal spike of 1.18 g. Eventually it sisi to 1.3 g. He had x-rays of the skull and other bones all of which were negative. His UPEP was negative for a monoclonal spike. His chem 20 as normal and has remained with normal calcium, protein and creatinine tthorugh the years. He had a bone marrow aspiration and biopsy on 05/03/2010 because of his increase in the spike of serum protein electrophoresis. 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    Serum free light chains in 07/2009 were reported as increased mostly at the expense of the lambda chains. There were measured at 7.13. We felt that based on the absence of lytic lesions, hypercalcemia, renal failure or significant anemia, he had at best a smoldering myeloma that we could follow expectantly. \He has chronic back pain of the spine    Imaging studies on 03/17/2011 ordered by orthopedics showed some disk disease and degenerative joint disease but no evidence of myeloma.    H A repeat MRI of the LS spine on 8/2014 showed DJD changes and no obvious evidence of myeloma.  Back pain continues about the same     He has chronic SOB for which he follows up with dr Szymanski and is on home 02.  His lab test in October 2016 show anemia of  10.8  calcium is normal, total Serum protein WAS 10.  SPEp showed a monoclonal spike of 4.82 gr with lambda free light chains at 108 hE had a small monoclonal pspike represetning 12% of the protein in the  urine  He was asked to have a bone marrow. He was diagnosed as having active myeloma.> Biopsy was done at the [facility]. Report is in the MR MRdia section of EPIC.  Cytogenetics were ported as follows: 46XY, alonso(3), t(3;3), (P25, q21) , (15/46 XY (5)     He also had a positive T cell gene rearangement which raised the possibility of a concomitant T cell disorder. It was felt ,yeloma was the leading issue, and to treat him for it.  He just came out of the hospital after a prolonged series ofback to back admissions to the [facility], [facility], and the [facility].  He has responded nicely to the initial treatment and dwas placed  on maintenance revlimid 10 mg po d1-21 q28 days.  P  He came out from a prolonged admission at [facility] due to osteomyelitis of oen the toes requriing amputation of the toe.  He is off antibioticsHis primary care is now at [facility].  He gets b12 Monthly injections at our office He remains SOb on around the clock 02    He comes in for follow up and says he is doing OK> He continues taking his maintenance revlimid  ALLERGIES: see med card  MEDICATIONS: See Med Card.  PAST MEDICAL HISTORY:  1. Smoldering multiple myeloma.        2. Obesity.  3. Chronic back pain with degenerative disk disease.  4. Coronary artery disease, status post bypass and coronary stents and  previous heart attacks.  5. High blood pressure.  6. Elevated cholesterol.  7. Peripheral vascular disease -- carotid stenosis.  8. History of leg edema.  9. Arthritis.  10.COPD  11.b12  deficiency  Review of Systems   Constitutional: Negative.  Negative for appetite change, fatigue and unexpected weight change.   Eyes: Negative.  Negative for visual disturbance.   Respiratory: Positive for shortness of breath. Negative for cough.    Cardiovascular: Negative.  Negative for chest pain.   Gastrointestinal: Negative for abdominal pain, diarrhea and nausea.   Genitourinary: Negative.  Negative for frequency and  hematuria.   Musculoskeletal: Negative.  Negative for back pain.   Skin: Negative.  Negative for rash.   Neurological: Negative.  Negative for headaches.   Hematological: Negative for adenopathy.   Psychiatric/Behavioral: Negative.  The patient is not nervous/anxious.        Objective:      Physical Exam   Constitutional: He is oriented to person, place, and time. He appears well-developed and well-nourished.   HENT:   Head: Normocephalic.   Mouth/Throat: No oropharyngeal exudate.   Eyes: Pupils are equal, round, and reactive to light.   Neck: No thyromegaly present.   Cardiovascular: Normal rate, regular rhythm and normal heart sounds.  Exam reveals no gallop.    No murmur heard.  1-2/6 deana at the base   Pulmonary/Chest: No respiratory distress. He has no wheezes. He has no rales.   Abdominal: Soft. Bowel sounds are normal. He exhibits no distension and no mass. There is no rebound and no guarding.   Musculoskeletal: Normal range of motion. He exhibits no edema.   Lymphadenopathy:     He has no cervical adenopathy.   Neurological: He is alert and oriented to person, place, and time.   Skin: Skin is warm and dry.   Psychiatric: He has a normal mood and affect. His behavior is normal.       Wt Readings from Last 3 Encounters:   12/01/17 91.5 kg (201 lb 11.5 oz)   10/26/17 94.8 kg (209 lb)   10/26/17 94.9 kg (209 lb 3.5 oz)     Temp Readings from Last 3 Encounters:   12/01/17 97.6 °F (36.4 °C) (Oral)   10/26/17 98 °F (36.7 °C) (Oral)   09/15/17 96 °F (35.6 °C)     BP Readings from Last 3 Encounters:   12/01/17 (!) 126/53   10/26/17 (!) 120/44   10/26/17 120/62     Pulse Readings from Last 3 Encounters:   12/01/17 (!) 56   10/26/17 60   10/26/17 64       Assessment:       1. Multiple myeloma in remission    2. Other vitamin B12 deficiency anemia      3-Thrombocytopenia  Plan:       Lab Results   Component Value Date    WBC 6.45 12/01/2017    HGB 11.5 (L) 12/01/2017    HCT 36.1 (L) 12/01/2017     (H) 12/01/2017      (L) 12/01/2017       Hemoglobin has rebounded. plateltes are a little low, most likely due to the revlimid.  See me  Decemeber 28 with a cbc and a cmp  B12 today

## 2017-12-01 NOTE — PATIENT INSTRUCTIONS
[facility]  [address]  [address] [address]  [phone] phone     [phone] fax  Hours of Operation: Monday- Friday 8:00am- 5:00pm  After hours phone  [phone]  Hematology / Oncology Physicians on call      Dr. Tomi Del Toro                        Please call with any concerns regarding your appointment today.  FALL PREVENTION   Falls often occur due to slipping, tripping or losing your balance. Here are ways to reduce your risk of falling again.   Was there anything that caused your fall that can be fixed, removed or replaced?   Make your home safe by keeping walkways clear of objects you may trip over.   Use non-slip pads under rugs.   Do not walk in poorly lit areas.   Do not stand on chairs or wobbly ladders.   Use caution when reaching overhead or looking upward. This position can cause a loss of balance.   Be sure your shoes fit properly, have non-slip bottoms and are in good condition.   Be cautious when going up and down stairs, curbs, and when walking on uneven sidewalks.   If your balance is poor, consider using a cane or walker.   If your fall was related to alcohol use, stop or limit alcohol intake.   If your fall was related to use of sleeping medicines, talk to your doctor about this. You may need to reduce your dosage at bedtime if you awaken during the night to go to the bathroom.   To reduce the need for nighttime bathroom trips:   Avoid drinking fluids for several hours before going to bed   Empty your bladder before going to bed   Men can keep a urinal at the bedside   © 0956-0143 [employer], [address] [place] [address], [place] [address]. All rights reserved. This information is not intended as a substitute for professional medical care. Always follow your healthcare professional's instructions.

## 2017-12-04 LAB
ALBUMIN SERPL ELPH-MCNC: 3.83 G/DL
ALPHA1 GLOB SERPL ELPH-MCNC: 0.33 G/DL
ALPHA2 GLOB SERPL ELPH-MCNC: 0.83 G/DL
B-GLOBULIN SERPL ELPH-MCNC: 1.14 G/DL
GAMMA GLOB SERPL ELPH-MCNC: 0.77 G/DL
PATHOLOGIST INTERPRETATION SPE: NORMAL
PROT SERPL-MCNC: 6.9 G/DL

## 2017-12-05 LAB
INTERPRETATION SERPL IFE-IMP: NORMAL
PATHOLOGIST INTERPRETATION IFE: NORMAL

## 2017-12-13 DIAGNOSIS — F41.9 ANXIETY: ICD-10-CM

## 2017-12-13 RX ORDER — CLONAZEPAM 0.5 MG/1
0.5 TABLET ORAL DAILY
Qty: 30 TABLET | Refills: 0 | Status: SHIPPED | OUTPATIENT
Start: 2017-12-13 | End: 2018-01-16 | Stop reason: SDUPTHER

## 2017-12-13 RX ORDER — CLONAZEPAM 0.5 MG/1
0.5 TABLET ORAL DAILY
Qty: 30 TABLET | Refills: 0 | Status: CANCELLED | OUTPATIENT
Start: 2017-12-13 | End: 2018-12-13

## 2017-12-13 NOTE — TELEPHONE ENCOUNTER
Patient is request #60 of klonopin  I told him I did not know if I could get an increase of quantity. But he still needs a refill.

## 2017-12-13 NOTE — TELEPHONE ENCOUNTER
----- Message from Orlin Lakhani MD sent at 12/13/2017  2:46 PM CST -----  I sent the prescription refill electronically to his pharmacy.  I have sent 30 tablets.  Thank you.

## 2017-12-20 DIAGNOSIS — C90.00 MULTIPLE MYELOMA NOT HAVING ACHIEVED REMISSION: ICD-10-CM

## 2017-12-20 RX ORDER — LENALIDOMIDE 10 MG/1
CAPSULE ORAL
Qty: 21 EACH | Refills: 0 | Status: SHIPPED | OUTPATIENT
Start: 2017-12-20 | End: 2018-02-07 | Stop reason: SDUPTHER

## 2017-12-28 ENCOUNTER — TELEPHONE (OUTPATIENT)
Dept: HEMATOLOGY/ONCOLOGY | Facility: CLINIC | Age: 82
End: 2017-12-28

## 2017-12-28 NOTE — TELEPHONE ENCOUNTER
----- Message from Hortencia Coleman sent at 12/28/2017  8:45 AM CST -----  Please call pt gabriele Katz about rescheduling her uncle appt and infusion at [facility]. Pt is not feeling well and need to reschedule. Please call her at [phone].

## 2018-01-01 NOTE — PATIENT INSTRUCTIONS
[facility]  [address]  [address] [address]  [phone] phone     [phone] fax  Hours of Operation: Monday- Friday 8:00am- 5:00pm  After hours phone  [phone]  Hematology / Oncology Physicians on call      Dr. Tomi Vaughn    Please call with any concerns regarding your appointment today.   Statement Selected

## 2018-01-16 ENCOUNTER — INFUSION (OUTPATIENT)
Dept: INFUSION THERAPY | Facility: HOSPITAL | Age: 83
End: 2018-01-16
Attending: INTERNAL MEDICINE
Payer: MEDICARE

## 2018-01-16 ENCOUNTER — LAB VISIT (OUTPATIENT)
Dept: LAB | Facility: HOSPITAL | Age: 83
End: 2018-01-16
Attending: INTERNAL MEDICINE
Payer: MEDICARE

## 2018-01-16 ENCOUNTER — OFFICE VISIT (OUTPATIENT)
Dept: HEMATOLOGY/ONCOLOGY | Facility: CLINIC | Age: 83
End: 2018-01-16
Payer: MEDICARE

## 2018-01-16 VITALS
RESPIRATION RATE: 18 BRPM | OXYGEN SATURATION: 74 % | DIASTOLIC BLOOD PRESSURE: 80 MMHG | TEMPERATURE: 99 F | BODY MASS INDEX: 29.83 KG/M2 | HEART RATE: 86 BPM | HEIGHT: 67 IN | SYSTOLIC BLOOD PRESSURE: 120 MMHG | WEIGHT: 190.06 LBS

## 2018-01-16 DIAGNOSIS — F41.9 ANXIETY: ICD-10-CM

## 2018-01-16 DIAGNOSIS — D51.8 OTHER VITAMIN B12 DEFICIENCY ANEMIA: ICD-10-CM

## 2018-01-16 DIAGNOSIS — D51.8 OTHER VITAMIN B12 DEFICIENCY ANEMIA: Primary | ICD-10-CM

## 2018-01-16 DIAGNOSIS — C90.01 MULTIPLE MYELOMA IN REMISSION: ICD-10-CM

## 2018-01-16 DIAGNOSIS — C90.01 MULTIPLE MYELOMA IN REMISSION: Primary | ICD-10-CM

## 2018-01-16 LAB
ALBUMIN SERPL BCP-MCNC: 3.2 G/DL
ALP SERPL-CCNC: 80 U/L
ALT SERPL W/O P-5'-P-CCNC: 30 U/L
ANION GAP SERPL CALC-SCNC: 10 MMOL/L
AST SERPL-CCNC: 17 U/L
BASOPHILS # BLD AUTO: 0.01 K/UL
BASOPHILS NFR BLD: 0.1 %
BILIRUB SERPL-MCNC: 1.1 MG/DL
BUN SERPL-MCNC: 17 MG/DL
CALCIUM SERPL-MCNC: 9.2 MG/DL
CHLORIDE SERPL-SCNC: 91 MMOL/L
CO2 SERPL-SCNC: 35 MMOL/L
CREAT SERPL-MCNC: 1.1 MG/DL
DIFFERENTIAL METHOD: ABNORMAL
EOSINOPHIL # BLD AUTO: 0.3 K/UL
EOSINOPHIL NFR BLD: 3.5 %
ERYTHROCYTE [DISTWIDTH] IN BLOOD BY AUTOMATED COUNT: 19.2 %
EST. GFR  (AFRICAN AMERICAN): >60 ML/MIN/1.73 M^2
EST. GFR  (NON AFRICAN AMERICAN): >60 ML/MIN/1.73 M^2
GLUCOSE SERPL-MCNC: 131 MG/DL
HCT VFR BLD AUTO: 35.9 %
HGB BLD-MCNC: 11 G/DL
LYMPHOCYTES # BLD AUTO: 1.1 K/UL
LYMPHOCYTES NFR BLD: 11.6 %
MCH RBC QN AUTO: 33.1 PG
MCHC RBC AUTO-ENTMCNC: 30.6 G/DL
MCV RBC AUTO: 108 FL
MONOCYTES # BLD AUTO: 1.9 K/UL
MONOCYTES NFR BLD: 19.6 %
NEUTROPHILS # BLD AUTO: 6.2 K/UL
NEUTROPHILS NFR BLD: 65.2 %
PLATELET # BLD AUTO: 91 K/UL
PMV BLD AUTO: 9.8 FL
POTASSIUM SERPL-SCNC: 4.2 MMOL/L
PROT SERPL-MCNC: 6.7 G/DL
RBC # BLD AUTO: 3.32 M/UL
SODIUM SERPL-SCNC: 136 MMOL/L
WBC # BLD AUTO: 9.52 K/UL

## 2018-01-16 PROCEDURE — 80053 COMPREHEN METABOLIC PANEL: CPT

## 2018-01-16 PROCEDURE — 99215 OFFICE O/P EST HI 40 MIN: CPT | Mod: 25,S$GLB,, | Performed by: INTERNAL MEDICINE

## 2018-01-16 PROCEDURE — 96372 THER/PROPH/DIAG INJ SC/IM: CPT | Mod: PO

## 2018-01-16 PROCEDURE — 63600175 PHARM REV CODE 636 W HCPCS: Mod: PO | Performed by: INTERNAL MEDICINE

## 2018-01-16 PROCEDURE — 99499 UNLISTED E&M SERVICE: CPT | Mod: S$GLB,,, | Performed by: INTERNAL MEDICINE

## 2018-01-16 PROCEDURE — 36415 COLL VENOUS BLD VENIPUNCTURE: CPT | Mod: PO

## 2018-01-16 PROCEDURE — 85025 COMPLETE CBC W/AUTO DIFF WBC: CPT | Mod: PO

## 2018-01-16 PROCEDURE — 99999 PR PBB SHADOW E&M-EST. PATIENT-LVL V: CPT | Mod: PBBFAC,,, | Performed by: INTERNAL MEDICINE

## 2018-01-16 RX ORDER — CYANOCOBALAMIN 1000 UG/ML
1000 INJECTION, SOLUTION INTRAMUSCULAR; SUBCUTANEOUS
Status: DISCONTINUED | OUTPATIENT
Start: 2018-01-16 | End: 2018-01-16 | Stop reason: HOSPADM

## 2018-01-16 RX ORDER — MIRTAZAPINE 15 MG/1
15 TABLET, FILM COATED ORAL NIGHTLY
COMMUNITY

## 2018-01-16 RX ORDER — OXYCODONE AND ACETAMINOPHEN 10; 325 MG/1; MG/1
1 TABLET ORAL EVERY 6 HOURS PRN
Qty: 60 TABLET | Refills: 0 | Status: SHIPPED | OUTPATIENT
Start: 2018-01-16 | End: 2018-04-05 | Stop reason: SDUPTHER

## 2018-01-16 RX ORDER — CLONAZEPAM 0.5 MG/1
0.5 TABLET ORAL DAILY
Qty: 30 TABLET | Refills: 0 | Status: SHIPPED | OUTPATIENT
Start: 2018-01-16 | End: 2018-04-05 | Stop reason: SDUPTHER

## 2018-01-16 RX ORDER — CYANOCOBALAMIN 1000 UG/ML
1000 INJECTION, SOLUTION INTRAMUSCULAR; SUBCUTANEOUS
Status: CANCELLED | OUTPATIENT
Start: 2018-01-16

## 2018-01-16 RX ADMIN — CYANOCOBALAMIN 1000 MCG: 1000 INJECTION, SOLUTION INTRAMUSCULAR at 01:01

## 2018-01-16 NOTE — PATIENT INSTRUCTIONS
FALL PREVENTION   Falls often occur due to slipping, tripping or losing your balance. Here are ways to reduce your risk of falling again.   Was there anything that caused your fall that can be fixed, removed or replaced?   Make your home safe by keeping walkways clear of objects you may trip over.   Use non-slip pads under rugs.   Do not walk in poorly lit areas.   Do not stand on chairs or wobbly ladders.   Use caution when reaching overhead or looking upward. This position can cause a loss of balance.   Be sure your shoes fit properly, have non-slip bottoms and are in good condition.   Be cautious when going up and down stairs, curbs, and when walking on uneven sidewalks.   If your balance is poor, consider using a cane or walker.   If your fall was related to alcohol use, stop or limit alcohol intake.   If your fall was related to use of sleeping medicines, talk to your doctor about this. You may need to reduce your dosage at bedtime if you awaken during the night to go to the bathroom.   To reduce the need for nighttime bathroom trips:   Avoid drinking fluids for several hours before going to bed   Empty your bladder before going to bed   Men can keep a urinal at the bedside   © 4744-6947 [facility], [address] [place] [address], [place] [address]. All rights reserved. This information is not intended as a substitute for professional medical care. Always follow your healthcare professional's instructions.    [facility]  [address] [place] [address] Drive  [phone] phone     [phone] fax  Hours of Operation: Monday- Friday 8:00am- 5:00pm  After hours phone  [phone]  Hematology / Oncology Physicians on call      Dr. Toim Del Toro                        Please call with any concerns regarding your appointment today.

## 2018-01-16 NOTE — PROGRESS NOTES
Patient, Lorne Roe (MRN #[id number]), presented with a recent Platelet count less than 150 K/uL consistent with the definition of thrombocytopenia (ICD10 - D69.6).    Platelets   Date Value Ref Range Status   01/16/2018 91 (L) 150 - 350 K/uL Final     The patient's thrombocytopenia was monitored, evaluated, addressed and/or treated. This addendum to the medical record is made on 01/16/2018.

## 2018-01-16 NOTE — PROGRESS NOTES
Reason for visit: Follow-up for multiple myeloma    HPI:   The patient is a 82-year-old  male with multiple myeloma who presents to the hematology oncology clinic today for follow-up.  The patient is followed in the outpatient hematology oncology clinic by Dr. Zheng Isabel.  I have reviewed all of the patient's relevant clinical history available in the medical record and have utilized this in my evaluation and management recommendations today.  The patient reports that he is slowly recovering from an upper respiratory tract infection and states that he is feeling much better today than he did last week.  He denies any chest pain.  He reports that his chronic shortness of breath is stable.  He denies any cough.  He denies any melena, hematochezia, hematemesis, hemoptysis or hematuria.  He denies any fevers, chills or night sweats.  He denies any loss of appetite or unintentional weight loss.  He reports that he is tolerating his maintenance Revlimid well without any significant side effects at this time.    PAST MEDICAL HISTORY/SURGICAL HISTORY/FAMILY HISTORY/SOCIAL HISTORY: Reviewed and updated from Dr. Isabel's prior clinic notes.    ALLERGIES: [NKDA]    MEDICATIONS: [Medcard has been reviewed and/or reconciled.]    REVIEW OF SYSTEMS:   GENERAL: [No fevers, chills or sweats. Reports chronic fatigue. Denies weight loss or loss of appetite.]  HEENT: [No blurred vision, tinnitus, nasal discharge, sorethroat or dysphagia.]  HEART: [No chest pain, palpitations. Reports chronic shortness of breath.]    LUNGS: [No cough, hemoptysis.]  Reports chronic shortness of breath.  ABDOMEN: [No abdominal pain, nausea, vomiting, diarrhea, constipation or melena.]  GENITOURINARY: [No dysuria, bleeding or malodorous discharge.]  NEURO: [No headache, dizziness or vertigo.]  HEMATOLOGY: [No easy bruising, spontaneous bleeding or blood clots in the past].  MUSCULOSKELETAL: [No arthralgias, myalgias or bone pains.]  SKIN: [No  rashes or skin lesions.]  PSYCHIATRY: [No depression or anxiety.]    PHYSICAL EXAMINATION:   VS: Reviewed on nurse's notes.  APPEARANCE: The patient is an obese and well-groomed  male who appears in no acute distress.  He presents to this clinic visit seated in a wheelchair.  He is using supplemental oxygen.  He was accompanied to this clinic visit by his niece.  HEENT: No scleral icterus. Both external auditory canals clear. No oral ulcers, lesions. Throat clear  HEAD: No sinus tenderness.  NECK: Supple. No palpable lymphadenopathy.   CHEST: Breath sounds clear bilaterally. No rales. No rhonchi. Unlabored respirations.  CARDIOVASCULAR: Normal S1, S2. Normal rate. Regular rhythm.  ABDOMEN: Bowel sounds normal. No tenderness. No abdominal distention. No hepatomegaly. No splenomegaly.  LYMPHATIC: No palpable supraclavicular, axillary nodes  EXTREMITIES: No clubbing, cyanosis, edema  SKIN: No lesions. No petechiae. No ecchymoses. No induration or nodules  NEUROLOGIC: No focal findings. Alert & Oriented x 3. Mood appropriate to affect    LABS:   Reviewed    IMAGING:  Reviewed    IMPRESSION:  1.  Multiple myeloma  2.  Thrombocytopenia  3.  Vitamin B12 deficiency    PLAN:  1.  I had a detailed discussion with the patient today with regard to his current clinical situation.  He reports that he is scheduled to finish his last dose of his current cycle of Revlimid tomorrow.  We discussed about the various possible etiologies for his gradually worsening thrombocytopenia.  This could be related to the Revlimid.  However recurrence/worsening of his multiple myeloma is also in the differential.  I will check SPEP, light chains and serum immunofixation today.  2.  The patient will continue with monthly vitamin B12 injections for treatment of vitamin B12 deficiency.  He will proceed with his next scheduled dose today.    Follow-up in 1 week with labs to review results and discuss further management.  He knows to call  sooner if any new problems or questions.    Orlin Lakhani MD

## 2018-01-22 ENCOUNTER — TELEPHONE (OUTPATIENT)
Dept: HEMATOLOGY/ONCOLOGY | Facility: CLINIC | Age: 83
End: 2018-01-22

## 2018-01-22 NOTE — TELEPHONE ENCOUNTER
----- Message from Orlin Lakhani MD sent at 1/22/2018 10:04 AM CST -----  Contact: Gianna pt's niece/power of   Please let his niece know that all of his recent lab work showed that his cancer continues to be in remission which is good news. I want him to continue to hold his revlimid until his platelets get better.  Please have her call us when he is discharged from the hospital so that I can see him back in the clinic and we can make a decision about the Revlimid as soon as possible.  Thank you.  ----- Message -----  From: Lucina Sellers MA  Sent: 1/22/2018   9:57 AM  To: Orlin Lakhani MD     Pt is in hospital, [facility] pt niece states she wanted you to know dr joni mcnair is his heart doctor and stated his platelets are still low.. She also wanted to know if you want to change medication and what about chemo on tomorrow?   ----- Message -----  From: Sarah Washington  Sent: 1/22/2018   9:46 AM  To: Lesvia Ordaz Staff    She's calling stating that the pt is at [facility] and she wanted to speak to a nurse, please advise [phone]

## 2018-01-22 NOTE — TELEPHONE ENCOUNTER
Direction given to pt gabriele states she understood and will follow up after pt is released from the hospital.

## 2018-01-23 ENCOUNTER — TELEPHONE (OUTPATIENT)
Dept: HEMATOLOGY/ONCOLOGY | Facility: CLINIC | Age: 83
End: 2018-01-23

## 2018-01-23 NOTE — TELEPHONE ENCOUNTER
Called patient to reschedule missed lab and appointment left a message on voicemail to return call to reschedule

## 2018-02-07 DIAGNOSIS — C90.00 MULTIPLE MYELOMA NOT HAVING ACHIEVED REMISSION: ICD-10-CM

## 2018-02-07 RX ORDER — LENALIDOMIDE 10 MG/1
CAPSULE ORAL
Qty: 21 EACH | Refills: 0 | Status: SHIPPED | OUTPATIENT
Start: 2018-02-07 | End: 2018-03-12 | Stop reason: SDUPTHER

## 2018-02-14 ENCOUNTER — TELEPHONE (OUTPATIENT)
Dept: HEMATOLOGY/ONCOLOGY | Facility: CLINIC | Age: 83
End: 2018-02-14

## 2018-02-14 NOTE — TELEPHONE ENCOUNTER
----- Message from Racheal Robledo sent at 2/14/2018 11:27 AM CST -----  Contact: pt relative- Gianna Chavarria  State calling to see if the nurse received the fax from [facility]. Please adv/call [phone].//thanks. cw

## 2018-02-14 NOTE — TELEPHONE ENCOUNTER
Advised patient's niece that prescription was sent on 2/7/18/.  Dr Lakhani is in [place] today.  Will resend prescription tomorrow

## 2018-02-15 ENCOUNTER — TELEPHONE (OUTPATIENT)
Dept: HEMATOLOGY/ONCOLOGY | Facility: CLINIC | Age: 83
End: 2018-02-15

## 2018-02-15 NOTE — TELEPHONE ENCOUNTER
Advised Gianna that I refaxed his prescription to [facility] She is to call tomorrow if the pharmacy does not have the prescription in their system by tomorrow.

## 2018-02-15 NOTE — TELEPHONE ENCOUNTER
----- Message from Lucina Sellers MA sent at 2/15/2018  3:01 PM CST -----      ----- Message -----  From: Angela Angel  Sent: 2/15/2018  11:57 AM  To: Lesvia Ordaz Staff    Pt gabriele(Gianna) at [phone]///states is calling regarding pt's chemo med//checking if it has been worked out with the pharmacy//please call to discuss//eduar/mansi

## 2018-03-01 ENCOUNTER — TELEPHONE (OUTPATIENT)
Dept: HEMATOLOGY/ONCOLOGY | Facility: CLINIC | Age: 83
End: 2018-03-01

## 2018-03-02 ENCOUNTER — OFFICE VISIT (OUTPATIENT)
Dept: HEMATOLOGY/ONCOLOGY | Facility: CLINIC | Age: 83
End: 2018-03-02
Payer: MEDICARE

## 2018-03-02 ENCOUNTER — LAB VISIT (OUTPATIENT)
Dept: LAB | Facility: HOSPITAL | Age: 83
End: 2018-03-02
Attending: INTERNAL MEDICINE
Payer: MEDICARE

## 2018-03-02 ENCOUNTER — INFUSION (OUTPATIENT)
Dept: INFUSION THERAPY | Facility: HOSPITAL | Age: 83
End: 2018-03-02
Attending: INTERNAL MEDICINE
Payer: MEDICARE

## 2018-03-02 VITALS
TEMPERATURE: 99 F | HEIGHT: 67 IN | HEART RATE: 102 BPM | BODY MASS INDEX: 29.13 KG/M2 | RESPIRATION RATE: 20 BRPM | DIASTOLIC BLOOD PRESSURE: 52 MMHG | WEIGHT: 185.63 LBS | OXYGEN SATURATION: 91 % | SYSTOLIC BLOOD PRESSURE: 102 MMHG

## 2018-03-02 DIAGNOSIS — D69.6 THROMBOCYTOPENIA: ICD-10-CM

## 2018-03-02 DIAGNOSIS — D63.8 ANEMIA, CHRONIC DISEASE: ICD-10-CM

## 2018-03-02 DIAGNOSIS — J96.11 CHRONIC RESPIRATORY FAILURE WITH HYPOXIA: ICD-10-CM

## 2018-03-02 DIAGNOSIS — D51.8 OTHER VITAMIN B12 DEFICIENCY ANEMIA: Primary | ICD-10-CM

## 2018-03-02 DIAGNOSIS — D51.8 OTHER VITAMIN B12 DEFICIENCY ANEMIA: ICD-10-CM

## 2018-03-02 DIAGNOSIS — C90.00 MULTIPLE MYELOMA NOT HAVING ACHIEVED REMISSION: ICD-10-CM

## 2018-03-02 DIAGNOSIS — J44.9 CHRONIC OBSTRUCTIVE PULMONARY DISEASE, UNSPECIFIED COPD TYPE: ICD-10-CM

## 2018-03-02 DIAGNOSIS — C90.01 MULTIPLE MYELOMA IN REMISSION: Primary | ICD-10-CM

## 2018-03-02 LAB
ALBUMIN SERPL BCP-MCNC: 2.7 G/DL
ALP SERPL-CCNC: 92 U/L
ALT SERPL W/O P-5'-P-CCNC: 12 U/L
ANION GAP SERPL CALC-SCNC: 13 MMOL/L
AST SERPL-CCNC: 9 U/L
BASOPHILS # BLD AUTO: 0.01 K/UL
BASOPHILS NFR BLD: 0.1 %
BILIRUB SERPL-MCNC: 0.5 MG/DL
BUN SERPL-MCNC: 22 MG/DL
CALCIUM SERPL-MCNC: 8.7 MG/DL
CHLORIDE SERPL-SCNC: 95 MMOL/L
CO2 SERPL-SCNC: 28 MMOL/L
CREAT SERPL-MCNC: 1.4 MG/DL
DIFFERENTIAL METHOD: ABNORMAL
EOSINOPHIL # BLD AUTO: 0.1 K/UL
EOSINOPHIL NFR BLD: 1.1 %
ERYTHROCYTE [DISTWIDTH] IN BLOOD BY AUTOMATED COUNT: 18 %
EST. GFR  (AFRICAN AMERICAN): 53 ML/MIN/1.73 M^2
EST. GFR  (NON AFRICAN AMERICAN): 46 ML/MIN/1.73 M^2
GLUCOSE SERPL-MCNC: 120 MG/DL
HCT VFR BLD AUTO: 31.3 %
HGB BLD-MCNC: 9.4 G/DL
LYMPHOCYTES # BLD AUTO: 2.1 K/UL
LYMPHOCYTES NFR BLD: 21.1 %
MCH RBC QN AUTO: 32.3 PG
MCHC RBC AUTO-ENTMCNC: 30 G/DL
MCV RBC AUTO: 108 FL
MONOCYTES # BLD AUTO: 1.6 K/UL
MONOCYTES NFR BLD: 15.5 %
NEUTROPHILS # BLD AUTO: 6.3 K/UL
NEUTROPHILS NFR BLD: 62.2 %
PLATELET # BLD AUTO: 87 K/UL
PMV BLD AUTO: 11.2 FL
POTASSIUM SERPL-SCNC: 4.4 MMOL/L
PROT SERPL-MCNC: 6.7 G/DL
RBC # BLD AUTO: 2.91 M/UL
SODIUM SERPL-SCNC: 136 MMOL/L
WBC # BLD AUTO: 10.1 K/UL

## 2018-03-02 PROCEDURE — 85025 COMPLETE CBC W/AUTO DIFF WBC: CPT | Mod: PO

## 2018-03-02 PROCEDURE — 63600175 PHARM REV CODE 636 W HCPCS: Mod: PO | Performed by: INTERNAL MEDICINE

## 2018-03-02 PROCEDURE — 99499 UNLISTED E&M SERVICE: CPT | Mod: S$GLB,,, | Performed by: INTERNAL MEDICINE

## 2018-03-02 PROCEDURE — 96372 THER/PROPH/DIAG INJ SC/IM: CPT | Mod: PO

## 2018-03-02 PROCEDURE — 36415 COLL VENOUS BLD VENIPUNCTURE: CPT | Mod: PO

## 2018-03-02 PROCEDURE — 99215 OFFICE O/P EST HI 40 MIN: CPT | Mod: 25,S$GLB,, | Performed by: INTERNAL MEDICINE

## 2018-03-02 PROCEDURE — 80053 COMPREHEN METABOLIC PANEL: CPT | Mod: PO

## 2018-03-02 PROCEDURE — 3078F DIAST BP <80 MM HG: CPT | Mod: S$GLB,,, | Performed by: INTERNAL MEDICINE

## 2018-03-02 PROCEDURE — 3074F SYST BP LT 130 MM HG: CPT | Mod: S$GLB,,, | Performed by: INTERNAL MEDICINE

## 2018-03-02 PROCEDURE — 99999 PR PBB SHADOW E&M-EST. PATIENT-LVL V: CPT | Mod: PBBFAC,,, | Performed by: INTERNAL MEDICINE

## 2018-03-02 RX ORDER — DOFETILIDE 0.12 MG/1
CAPSULE ORAL
COMMUNITY

## 2018-03-02 RX ORDER — ERGOCALCIFEROL 1.25 MG/1
CAPSULE ORAL
COMMUNITY
End: 2018-03-02

## 2018-03-02 RX ORDER — PANTOPRAZOLE SODIUM 40 MG/1
40 TABLET, DELAYED RELEASE ORAL
COMMUNITY
End: 2018-03-02

## 2018-03-02 RX ORDER — CLONAZEPAM 0.5 MG/1
0.5 TABLET ORAL
COMMUNITY
End: 2018-03-02

## 2018-03-02 RX ORDER — MIDODRINE HYDROCHLORIDE 5 MG/1
5 TABLET ORAL
COMMUNITY

## 2018-03-02 RX ORDER — BUPROPION HYDROCHLORIDE 75 MG/1
75 TABLET ORAL
COMMUNITY

## 2018-03-02 RX ORDER — NITROGLYCERIN 0.4 MG/1
0.4 TABLET SUBLINGUAL
COMMUNITY
End: 2018-03-02

## 2018-03-02 RX ORDER — CYANOCOBALAMIN 1000 UG/ML
1000 INJECTION, SOLUTION INTRAMUSCULAR; SUBCUTANEOUS
Status: CANCELLED | OUTPATIENT
Start: 2018-03-02

## 2018-03-02 RX ORDER — TAMSULOSIN HYDROCHLORIDE 0.4 MG/1
0.4 CAPSULE ORAL
COMMUNITY

## 2018-03-02 RX ORDER — ATENOLOL 50 MG/1
25 TABLET ORAL
COMMUNITY
End: 2018-03-02

## 2018-03-02 RX ORDER — ATORVASTATIN CALCIUM 20 MG/1
10 TABLET, FILM COATED ORAL
COMMUNITY

## 2018-03-02 RX ORDER — SPIRONOLACTONE 50 MG/1
50 TABLET, FILM COATED ORAL
COMMUNITY
Start: 2018-02-08 | End: 2018-03-02

## 2018-03-02 RX ORDER — CYANOCOBALAMIN 1000 UG/ML
1000 INJECTION, SOLUTION INTRAMUSCULAR; SUBCUTANEOUS
Status: DISCONTINUED | OUTPATIENT
Start: 2018-03-02 | End: 2018-03-02 | Stop reason: HOSPADM

## 2018-03-02 RX ORDER — ACYCLOVIR 200 MG/1
400 CAPSULE ORAL
COMMUNITY

## 2018-03-02 RX ORDER — GUAIFENESIN 600 MG/1
600 TABLET, EXTENDED RELEASE ORAL
COMMUNITY

## 2018-03-02 RX ORDER — MIRTAZAPINE 15 MG/1
15 TABLET, FILM COATED ORAL
COMMUNITY
Start: 2017-12-22 | End: 2018-03-02

## 2018-03-02 RX ORDER — IPRATROPIUM BROMIDE AND ALBUTEROL SULFATE 2.5; .5 MG/3ML; MG/3ML
3 SOLUTION RESPIRATORY (INHALATION)
COMMUNITY
End: 2018-03-02

## 2018-03-02 RX ORDER — FOLIC ACID 1 MG/1
1 TABLET ORAL
COMMUNITY
Start: 2018-02-27 | End: 2018-03-29

## 2018-03-02 RX ORDER — LEVOTHYROXINE SODIUM 25 UG/1
25 TABLET ORAL
COMMUNITY

## 2018-03-02 RX ORDER — RIVAROXABAN 15 MG/1
15 TABLET, FILM COATED ORAL
Refills: 11 | COMMUNITY
Start: 2018-02-12

## 2018-03-02 RX ORDER — POTASSIUM CHLORIDE 750 MG/1
20 TABLET, EXTENDED RELEASE ORAL
COMMUNITY
End: 2018-03-21 | Stop reason: ALTCHOICE

## 2018-03-02 RX ORDER — LENALIDOMIDE 10 MG/1
CAPSULE ORAL
COMMUNITY
End: 2018-03-02

## 2018-03-02 RX ORDER — HYDROXYZINE HYDROCHLORIDE 10 MG/1
10 TABLET, FILM COATED ORAL
COMMUNITY

## 2018-03-02 RX ORDER — FUROSEMIDE 40 MG/1
40 TABLET ORAL
COMMUNITY
End: 2018-03-02

## 2018-03-02 RX ADMIN — CYANOCOBALAMIN 1000 MCG: 1000 INJECTION, SOLUTION INTRAMUSCULAR at 01:03

## 2018-03-02 NOTE — NURSING
.Injection given without difficulties.Bandaid applied. Patient instructed to stay in the clinic for 15 minutes. Patient verbalized understanding and will notify nurse with any complaints.\

## 2018-03-02 NOTE — PROGRESS NOTES
Subjective:       Patient ID: Lorne Roe is a 83 y.o. male.    Chief Complaint: No chief complaint on file.    HPI This is a 81 year-old  gentleman who comes today for follow up of his multiple. myeloma.  He is   known to us since 11/2008 because of mild anemia. Work up at that time showed vitamin B12 in the lower limits of normal. He was given vitamin B12 supplements once a month with improvement. In addition, a serum protein electrophoresis was reported as positive showing an IgA monoclonal spike of 1.18 g. Eventually it sisi to 1.3 g. He had x-rays of the skull and other bones all of which were negative. His UPEP was negative for a monoclonal spike. His chem 20 as normal and has remained with normal calcium, protein and creatinine tthorugh the years. He had a bone marrow aspiration and biopsy on 05/03/2010 because of his increase in the spike of serum protein electrophoresis. 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    Serum free light chains in 07/2009 were reported as increased mostly at the expense of the lambda chains. There were measured at 7.13. We felt that based on the absence of lytic lesions, hypercalcemia, renal failure or significant anemia, he had at best a smoldering myeloma that we could follow expectantly. \He has chronic back pain of the spine    Imaging studies on 03/17/2011 ordered by orthopedics showed some disk disease and degenerative joint disease but no evidence of myeloma.    H A repeat MRI of the LS spine on 8/2014 showed DJD changes and no obvious evidence of myeloma.  Back pain continues about the same     He has chronic SOB for which he follows up with dr Szymanski and is on home 02.  His lab test in October 2016 show anemia of  10.8  calcium is normal, total Serum protein WAS 10.  SPEp showed a monoclonal spike of 4.82 gr with lambda free light chains at 108 hE had a small monoclonal pspike represetning 12% of the protein in the  urine  He was asked to have a bone marrow. He was diagnosed as having active myeloma.> Biopsy was done at the [facility]. Report is in the MR MRdia section of EPIC.  Cytogenetics were ported as follows: 46XY, alonso(3), t(3;3), (P25, q21) , (15/46 XY (5)     He also had a positive T cell gene rearangement which raised the possibility of a concomitant T cell disorder. It was felt ,yeloma was the leading issue, and to treat him for it.   He s responded nicely to the initial treatment and was placed  on maintenance revlimid 10 mg po d1-21 q28 days.   His primary care is now at [facility].  He gets b12 Monthly injections at our office He remains SOb on around the clock 02     He comes in for follow up   He has an open skin lesion in the elft leg which is being followed by HH.  Has found a lump in the back of the neck.  He spent a couple of weeks in the Hospital and rehab after being admitted with what seems to be the flu.  It has been noticed he has developed moderate anemia and thrombocytopenia and his revlimid was held for several weeks    ALLERGIES: see med card  MEDICATIONS: See Med Card.  PAST MEDICAL HISTORY:  1. Multiple myeloma.        2. Obesity.  3. Chronic back pain with degenerative disk disease.  4. Coronary artery disease, status post bypass and coronary stents and  previous heart attacks.  5. High blood pressure.  6. Elevated cholesterol.  7. Peripheral vascular disease -- carotid stenosis.  8. History of leg edema.  9. Arthritis.  10.COPD  11.b12  deficiency   Review of Systems   Constitutional: Negative.  Negative for fatigue.   Eyes: Negative.    Respiratory: Positive for shortness of breath.    Cardiovascular: Negative.  Negative for chest pain.   Gastrointestinal: Negative for abdominal pain and nausea.   Genitourinary: Negative.  Negative for hematuria.   Musculoskeletal: Negative.    Skin:        Open wound left leg   Neurological: Negative.    Psychiatric/Behavioral: Negative.        Objective:       Physical Exam   Constitutional: He is oriented to person, place, and time. He appears well-developed and well-nourished.   Overweight, in wheel chair and portable oxygen   HENT:   Head: Normocephalic.   Mouth/Throat: No oropharyngeal exudate.   Eyes: Pupils are equal, round, and reactive to light.   Neck: No thyromegaly present.   Cardiovascular: Normal rate, regular rhythm and normal heart sounds.  Exam reveals no gallop.    No murmur heard.  Pulmonary/Chest: No respiratory distress. He has no wheezes. He has no rales.   Abdominal: Soft. Bowel sounds are normal. He exhibits no distension and no mass. There is no rebound and no guarding.   Musculoskeletal: Normal range of motion. He exhibits no edema.   Lymphadenopathy:     He has no cervical adenopathy.   Neurological: He is alert and oriented to person, place, and time.   Skin: Skin is warm and dry.   Abrasion in the lefft pretibial area with area of erythema.  Has what seems to be a lipoma of left side of theneck   Psychiatric: He has a normal mood and affect. His behavior is normal.       Wt Readings from Last 3 Encounters:   03/02/18 84.2 kg (185 lb 10 oz)   01/16/18 86.2 kg (190 lb 0.6 oz)   12/01/17 91.5 kg (201 lb 11.5 oz)     Temp Readings from Last 3 Encounters:   03/02/18 99.2 °F (37.3 °C) (Oral)   01/16/18 98.7 °F (37.1 °C) (Oral)   12/01/17 97.6 °F (36.4 °C) (Oral)     BP Readings from Last 3 Encounters:   03/02/18 (!) 102/52   01/16/18 120/80   12/01/17 (!) 126/53     Pulse Readings from Last 3 Encounters:   03/02/18 102   01/16/18 86   12/01/17 (!) 56       Assessment:       1. Multiple myeloma in remission    2. Chronic obstructive pulmonary disease, unspecified COPD type    3. Chronic respiratory failure with hypoxia    4. Other vitamin B12 deficiency anemia    5. Anemia, chronic disease    6. Thrombocytopenia      7-wound in leg  8-lipoma neck  Plan:       Lab Results   Component Value Date    WBC 10.10 03/02/2018    HGB 9.4 (L) 03/02/2018    HCT  31.3 (L) 03/02/2018     (H) 03/02/2018    PLT 87 (L) 03/02/2018       Platelet count is low. Will hold revlimid and recheck counts in a week.  He has bee  on 10 mg d1-21 q28 days.  Continue HH care of wound in left leg     Will montor lipoma of back of neck,  Continue oxygen  CMp/SPWEP and free light chains are pending  B12 to be given today by nurses  Will monitor HGB and recheck it in a week

## 2018-03-08 ENCOUNTER — PES CALL (OUTPATIENT)
Dept: ADMINISTRATIVE | Facility: CLINIC | Age: 83
End: 2018-03-08

## 2018-03-09 ENCOUNTER — TELEPHONE (OUTPATIENT)
Dept: HEMATOLOGY/ONCOLOGY | Facility: CLINIC | Age: 83
End: 2018-03-09

## 2018-03-09 NOTE — TELEPHONE ENCOUNTER
Spoke with Ms Chavarria.  She will bring Mr Roe to [facility] to see Dr Lakhani.

## 2018-03-12 ENCOUNTER — OFFICE VISIT (OUTPATIENT)
Dept: HEMATOLOGY/ONCOLOGY | Facility: CLINIC | Age: 83
End: 2018-03-12
Payer: MEDICARE

## 2018-03-12 VITALS
TEMPERATURE: 99 F | BODY MASS INDEX: 30.04 KG/M2 | DIASTOLIC BLOOD PRESSURE: 70 MMHG | WEIGHT: 191.38 LBS | OXYGEN SATURATION: 91 % | HEART RATE: 85 BPM | SYSTOLIC BLOOD PRESSURE: 120 MMHG | HEIGHT: 67 IN | RESPIRATION RATE: 18 BRPM

## 2018-03-12 DIAGNOSIS — D53.9 MACROCYTIC ANEMIA: ICD-10-CM

## 2018-03-12 DIAGNOSIS — C90.01 MULTIPLE MYELOMA IN REMISSION: Primary | ICD-10-CM

## 2018-03-12 DIAGNOSIS — C90.00 MULTIPLE MYELOMA NOT HAVING ACHIEVED REMISSION: ICD-10-CM

## 2018-03-12 DIAGNOSIS — J44.1 COPD EXACERBATION: ICD-10-CM

## 2018-03-12 PROCEDURE — 99999 PR PBB SHADOW E&M-EST. PATIENT-LVL V: CPT | Mod: PBBFAC,,, | Performed by: INTERNAL MEDICINE

## 2018-03-12 PROCEDURE — 3078F DIAST BP <80 MM HG: CPT | Mod: CPTII,S$GLB,, | Performed by: INTERNAL MEDICINE

## 2018-03-12 PROCEDURE — 3074F SYST BP LT 130 MM HG: CPT | Mod: CPTII,S$GLB,, | Performed by: INTERNAL MEDICINE

## 2018-03-12 PROCEDURE — 99214 OFFICE O/P EST MOD 30 MIN: CPT | Mod: S$GLB,,, | Performed by: INTERNAL MEDICINE

## 2018-03-12 PROCEDURE — 99499 UNLISTED E&M SERVICE: CPT | Mod: S$GLB,,, | Performed by: INTERNAL MEDICINE

## 2018-03-12 RX ORDER — HYDROCODONE BITARTRATE AND ACETAMINOPHEN 500; 5 MG/1; MG/1
TABLET ORAL ONCE
Status: CANCELLED | OUTPATIENT
Start: 2018-03-12 | End: 2018-03-12

## 2018-03-12 RX ORDER — FUROSEMIDE 10 MG/ML
20 INJECTION INTRAMUSCULAR; INTRAVENOUS ONCE
Status: CANCELLED | OUTPATIENT
Start: 2018-03-12

## 2018-03-12 RX ORDER — LENALIDOMIDE 10 MG/1
CAPSULE ORAL
Qty: 21 EACH | Refills: 0 | Status: SHIPPED | OUTPATIENT
Start: 2018-03-12 | End: 2018-03-19

## 2018-03-12 RX ORDER — DIPHENHYDRAMINE HYDROCHLORIDE 50 MG/ML
25 INJECTION INTRAMUSCULAR; INTRAVENOUS
Status: CANCELLED | OUTPATIENT
Start: 2018-03-12

## 2018-03-12 RX ORDER — ACETAMINOPHEN 325 MG/1
650 TABLET ORAL
Status: CANCELLED | OUTPATIENT
Start: 2018-03-12

## 2018-03-12 NOTE — PROGRESS NOTES
Subjective:       Patient ID: Lorne Roe is a 83 y.o. male.    Chief Complaint: Follow-up    HPI This is a 81 year-old  gentleman who comes today for follow up of his multiple. myeloma.  He is   known to us since 11/2008 because of mild anemia. Work up at that time showed vitamin B12 in the lower limits of normal. He was given vitamin B12 supplements once a month with improvement. In addition, a serum protein electrophoresis was reported as positive showing an IgA monoclonal spike of 1.18 g. Eventually it sisi to 1.3 g. He had x-rays of the skull and other bones all of which were negative. His UPEP was negative for a monoclonal spike. His chem 20 as normal and has remained with normal calcium, protein and creatinine tthorugh the years. He had a bone marrow aspiration and biopsy on 05/03/2010 because of his increase in the spike of serum protein electrophoresis. 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    Serum free light chains in 07/2009 were reported as increased mostly at the expense of the lambda chains. There were measured at 7.13. We felt that based on the absence of lytic lesions, hypercalcemia, renal failure or significant anemia, he had at best a smoldering myeloma that we could follow expectantly. \He has chronic back pain of the spine    Imaging studies on 03/17/2011 ordered by orthopedics showed some disk disease and degenerative joint disease but no evidence of myeloma.    H A repeat MRI of the LS spine on 8/2014 showed DJD changes and no obvious evidence of myeloma.  Back pain continues about the same     He has chronic SOB for which he follows up with dr Szymanski and is on home 02.  His lab test in October 2016 show anemia of  10.8  calcium is normal, total Serum protein WAS 10.  SPEp showed a monoclonal spike of 4.82 gr with lambda free light chains at 108 hE had a small monoclonal pspike represetning 12% of the protein in the urine  He was asked to  have a bone marrow. He was diagnosed as having active myeloma.> Biopsy was done at the [facility]. Report is in the MR MRdia section of EPIC.  Cytogenetics were ported as follows: 46XY, alonso(3), t(3;3), (P25, q21) , (15/46 XY (5)     He also had a positive T cell gene rearangement which raised the possibility of a concomitant T cell disorder. It was felt ,yeloma was the leading issue, and to treat him for it.   He s responded nicely to the initial treatment and was placed  on maintenance revlimid 10 mg po d1-21 q28 days.   His primary care is now at [facility].  He gets b12 Monthly injections at our office He remains SOb on around the clock 02  During the last visit he was found to have a hgb of  9.4  The revlimid was held and he was asked to see me in a week.  His SPEP is suppressed but his JH ahs dropped to 7.7  His niece tells he has an episode of severe epistaxis in early March requring a visit to the ER and packing of the nose.  He comes in for follow up   He has an open skin lesion in the elft leg which is being followed by HH.  H ALLERGIES: see med card  MEDICATIONS: See Med Card.  PAST MEDICAL HISTORY:  1. Multiple myeloma.        2. Obesity.  3. Chronic back pain with degenerative disk disease.  4. Coronary artery disease, status post bypass and coronary stents and  previous heart attacks.  5. High blood pressure.  6. Elevated cholesterol.  7. Peripheral vascular disease -- carotid stenosis.  8. History of leg edema.  9. Arthritis.  10.COPD  11.b12  deficiency  Review of Systems   Constitutional: Negative.  Negative for fatigue.   Eyes: Negative.    Cardiovascular: Negative.  Negative for chest pain.   Gastrointestinal: Negative for abdominal pain and nausea.   Genitourinary: Negative.  Negative for hematuria.   Musculoskeletal: Negative.    Skin: Negative.    Neurological: Negative.    Psychiatric/Behavioral: Negative.        Objective:      Physical Exam   Constitutional: He is oriented to person,  place, and time. He appears well-developed and well-nourished.   HENT:   Head: Normocephalic.   Mouth/Throat: No oropharyngeal exudate.   Eyes: Pupils are equal, round, and reactive to light.   Neck: No thyromegaly present.   Cardiovascular: Normal rate, regular rhythm and normal heart sounds.  Exam reveals no gallop.    No murmur heard.  Pulmonary/Chest: No respiratory distress. He has no wheezes. He has no rales.   Abdominal: Soft. Bowel sounds are normal. He exhibits no distension and no mass. There is no rebound and no guarding.   Musculoskeletal: Normal range of motion. He exhibits no edema.   Lymphadenopathy:     He has no cervical adenopathy.   Neurological: He is alert and oriented to person, place, and time.   Skin: Skin is warm and dry.   Psychiatric: He has a normal mood and affect. His behavior is normal.       Wt Readings from Last 3 Encounters:   03/12/18 86.8 kg (191 lb 5.8 oz)   03/02/18 84.2 kg (185 lb 10 oz)   01/16/18 86.2 kg (190 lb 0.6 oz)     Temp Readings from Last 3 Encounters:   03/12/18 98.6 °F (37 °C) (Oral)   03/02/18 99.2 °F (37.3 °C) (Oral)   01/16/18 98.7 °F (37.1 °C) (Oral)     BP Readings from Last 3 Encounters:   03/12/18 120/70   03/02/18 (!) 102/52   01/16/18 120/80     Pulse Readings from Last 3 Encounters:   03/12/18 85   03/02/18 102   01/16/18 86       Assessment:       1. Multiple myeloma in remission    2. Macrocytic anemia        Plan:       Lab Results   Component Value Date    WBC 6.02 03/12/2018    HGB 7.7 (L) 03/12/2018    HCT 26.2 (L) 03/12/2018     (H) 03/12/2018     (L) 03/12/2018       Will transfuse one umit packed cell.s see me in 7 days. Hold Revlimid

## 2018-03-13 ENCOUNTER — INFUSION (OUTPATIENT)
Dept: INFUSION THERAPY | Facility: HOSPITAL | Age: 83
End: 2018-03-13
Attending: INTERNAL MEDICINE
Payer: MEDICARE

## 2018-03-13 ENCOUNTER — LAB VISIT (OUTPATIENT)
Dept: LAB | Facility: HOSPITAL | Age: 83
End: 2018-03-13
Attending: INTERNAL MEDICINE
Payer: MEDICARE

## 2018-03-13 VITALS
TEMPERATURE: 98 F | SYSTOLIC BLOOD PRESSURE: 90 MMHG | RESPIRATION RATE: 20 BRPM | HEART RATE: 72 BPM | DIASTOLIC BLOOD PRESSURE: 43 MMHG

## 2018-03-13 DIAGNOSIS — C90.01 MULTIPLE MYELOMA IN REMISSION: ICD-10-CM

## 2018-03-13 LAB
ABO + RH BLD: NORMAL
BLD GP AB SCN CELLS X3 SERPL QL: NORMAL
BLD PROD TYP BPU: NORMAL
BLOOD UNIT EXPIRATION DATE: NORMAL
BLOOD UNIT TYPE CODE: 6200
BLOOD UNIT TYPE: NORMAL
CODING SYSTEM: NORMAL
DISPENSE STATUS: NORMAL
NUM UNITS TRANS PACKED RBC: NORMAL

## 2018-03-13 PROCEDURE — 63600175 PHARM REV CODE 636 W HCPCS: Performed by: INTERNAL MEDICINE

## 2018-03-13 PROCEDURE — 86901 BLOOD TYPING SEROLOGIC RH(D): CPT

## 2018-03-13 PROCEDURE — 25000003 PHARM REV CODE 250: Performed by: INTERNAL MEDICINE

## 2018-03-13 PROCEDURE — 86920 COMPATIBILITY TEST SPIN: CPT

## 2018-03-13 PROCEDURE — P9016 RBC LEUKOCYTES REDUCED: HCPCS

## 2018-03-13 PROCEDURE — 96374 THER/PROPH/DIAG INJ IV PUSH: CPT

## 2018-03-13 PROCEDURE — 36415 COLL VENOUS BLD VENIPUNCTURE: CPT

## 2018-03-13 PROCEDURE — 36430 TRANSFUSION BLD/BLD COMPNT: CPT

## 2018-03-13 RX ORDER — DIPHENHYDRAMINE HYDROCHLORIDE 50 MG/ML
25 INJECTION INTRAMUSCULAR; INTRAVENOUS
Status: COMPLETED | OUTPATIENT
Start: 2018-03-13 | End: 2018-03-13

## 2018-03-13 RX ORDER — HYDROCODONE BITARTRATE AND ACETAMINOPHEN 500; 5 MG/1; MG/1
TABLET ORAL ONCE
Status: DISCONTINUED | OUTPATIENT
Start: 2018-03-13 | End: 2018-03-13 | Stop reason: HOSPADM

## 2018-03-13 RX ORDER — ACETAMINOPHEN 325 MG/1
650 TABLET ORAL
Status: COMPLETED | OUTPATIENT
Start: 2018-03-13 | End: 2018-03-13

## 2018-03-13 RX ORDER — FUROSEMIDE 10 MG/ML
20 INJECTION INTRAMUSCULAR; INTRAVENOUS ONCE
Status: DISCONTINUED | OUTPATIENT
Start: 2018-03-13 | End: 2018-03-13 | Stop reason: HOSPADM

## 2018-03-13 RX ADMIN — DIPHENHYDRAMINE HYDROCHLORIDE 25 MG: 50 INJECTION, SOLUTION INTRAMUSCULAR; INTRAVENOUS at 10:03

## 2018-03-13 RX ADMIN — ACETAMINOPHEN 650 MG: 325 TABLET ORAL at 10:03

## 2018-03-13 NOTE — PLAN OF CARE
"Problem: Patient Care Overview  Goal: Plan of Care Review  Outcome: Ongoing (interventions implemented as appropriate)  Patient states "I have been feeling real weak and tired." States he believe he has had a transfusion before. Blood transfusion plan of care reviewed with patient. Verbalizes understanding.      "

## 2018-03-15 ENCOUNTER — TELEPHONE (OUTPATIENT)
Dept: PULMONOLOGY | Facility: CLINIC | Age: 83
End: 2018-03-15

## 2018-03-15 DIAGNOSIS — R06.02 SOB (SHORTNESS OF BREATH): Primary | ICD-10-CM

## 2018-03-16 DIAGNOSIS — E55.9 VITAMIN D DEFICIENCY DISEASE: ICD-10-CM

## 2018-03-16 RX ORDER — ERGOCALCIFEROL 1.25 MG/1
CAPSULE ORAL
Qty: 4 CAPSULE | Refills: 0 | Status: SHIPPED | OUTPATIENT
Start: 2018-03-16

## 2018-03-19 ENCOUNTER — OFFICE VISIT (OUTPATIENT)
Dept: HEMATOLOGY/ONCOLOGY | Facility: CLINIC | Age: 83
End: 2018-03-19
Payer: MEDICARE

## 2018-03-19 VITALS
OXYGEN SATURATION: 90 % | HEIGHT: 67 IN | RESPIRATION RATE: 18 BRPM | SYSTOLIC BLOOD PRESSURE: 92 MMHG | WEIGHT: 191.38 LBS | TEMPERATURE: 98 F | DIASTOLIC BLOOD PRESSURE: 52 MMHG | BODY MASS INDEX: 30.04 KG/M2 | HEART RATE: 79 BPM

## 2018-03-19 DIAGNOSIS — C90.01 MULTIPLE MYELOMA IN REMISSION: Primary | ICD-10-CM

## 2018-03-19 DIAGNOSIS — D51.8 OTHER VITAMIN B12 DEFICIENCY ANEMIA: ICD-10-CM

## 2018-03-19 DIAGNOSIS — M54.16 LUMBAR RADICULITIS: ICD-10-CM

## 2018-03-19 PROCEDURE — 99499 UNLISTED E&M SERVICE: CPT | Mod: S$GLB,,, | Performed by: INTERNAL MEDICINE

## 2018-03-19 PROCEDURE — 3078F DIAST BP <80 MM HG: CPT | Mod: CPTII,S$GLB,, | Performed by: INTERNAL MEDICINE

## 2018-03-19 PROCEDURE — 99214 OFFICE O/P EST MOD 30 MIN: CPT | Mod: S$GLB,,, | Performed by: INTERNAL MEDICINE

## 2018-03-19 PROCEDURE — 99999 PR PBB SHADOW E&M-EST. PATIENT-LVL V: CPT | Mod: PBBFAC,,, | Performed by: INTERNAL MEDICINE

## 2018-03-19 PROCEDURE — 3074F SYST BP LT 130 MM HG: CPT | Mod: CPTII,S$GLB,, | Performed by: INTERNAL MEDICINE

## 2018-03-19 RX ORDER — OXYCODONE AND ACETAMINOPHEN 10; 325 MG/1; MG/1
TABLET ORAL
COMMUNITY
End: 2018-03-19

## 2018-03-19 RX ORDER — DOFETILIDE 0.12 MG/1
125 CAPSULE ORAL
COMMUNITY
End: 2018-03-19

## 2018-03-19 RX ORDER — AMOXICILLIN AND CLAVULANATE POTASSIUM 875; 125 MG/1; MG/1
TABLET, FILM COATED ORAL
COMMUNITY
Start: 2018-03-08 | End: 2018-03-19

## 2018-03-19 RX ORDER — LENALIDOMIDE 5 MG/1
CAPSULE ORAL
Qty: 21 EACH | Refills: 1 | Status: SHIPPED | OUTPATIENT
Start: 2018-03-19 | End: 2018-04-09 | Stop reason: SDUPTHER

## 2018-03-19 NOTE — PROGRESS NOTES
Subjective:       Patient ID: Lorne Roe is a 83 y.o. male.    Chief Complaint: No chief complaint on file.    HPI This is a 81 year-old  gentleman who comes today for follow up of his multiple. myeloma.  He is   known to us since 11/2008 because of mild anemia. Work up at that time showed vitamin B12 in the lower limits of normal. He was given vitamin B12 supplements once a month with improvement. In addition, a serum protein electrophoresis was reported as positive showing an IgA monoclonal spike of 1.18 g. Eventually it sisi to 1.3 g. He had x-rays of the skull and other bones all of which were negative. His UPEP was negative for a monoclonal spike. His chem 20 as normal and has remained with normal calcium, protein and creatinine tthorugh the years. He had a bone marrow aspiration and biopsy on 05/03/2010 because of his increase in the spike of serum protein electrophoresis. 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    Serum free light chains in 07/2009 were reported as increased mostly at the expense of the lambda chains. There were measured at 7.13. We felt that based on the absence of lytic lesions, hypercalcemia, renal failure or significant anemia, he had at best a smoldering myeloma that we could follow expectantly. \He has chronic back pain of the spine    Imaging studies on 03/17/2011 ordered by orthopedics showed some disk disease and degenerative joint disease but no evidence of myeloma.    H A repeat MRI of the LS spine on 8/2014 showed DJD changes and no obvious evidence of myeloma.  Back pain continues about the same     He has chronic SOB for which he follows up with dr Szymanski and is on home 02.  His lab test in October 2016 show anemia of  10.8  calcium is normal, total Serum protein WAS 10.  SPEp showed a monoclonal spike of 4.82 gr with lambda free light chains at 108 hE had a small monoclonal pspike represetning 12% of the protein in the  urine  He was asked to have a bone marrow. He was diagnosed as having active myeloma.> Biopsy was done at the [facility]. Report is in the MR MRdia section of EPIC.  Cytogenetics were ported as follows: 46XY, alonso(3), t(3;3), (P25, q21) , (15/46 XY (5)     He also had a positive T cell gene rearangement which raised the possibility of a concomitant T cell disorder. It was felt ,yeloma was the leading issue, and to treat him for it.   He s responded nicely to the initial treatment and was placed  on maintenance revlimid 10 mg po d1-21 q28 days.   His primary care is now at [facility].  He gets b12 Monthly injections at our office He remains SOb on around the clock 02  During the last visit he was found to have a hgb of  9.4  The revlimid was held and he was asked to see me in a week.  His SPEP is suppressed but his [facility] ahs dropped to 7.7  He received a unit of packed cells and his revlimid was held..    ALLERGIES: see med card  MEDICATIONS: See Med Card.  PAST MEDICAL HISTORY:  1. Multiple myeloma.        2. Obesity.  3. Chronic back pain with degenerative disk disease.  4. Coronary artery disease, status post bypass and coronary stents and  previous heart attacks.  5. High blood pressure.  6. Elevated cholesterol.  7. Peripheral vascular disease -- carotid stenosis.  8. History of leg edema.  9. Arthritis.  10.COPD  11.b12  deficiency  Review of Systems   Constitutional: Negative.  Negative for appetite change, fatigue and unexpected weight change.   Eyes: Negative.  Negative for visual disturbance.   Respiratory: Positive for shortness of breath. Negative for cough.    Cardiovascular: Negative.  Negative for chest pain.   Gastrointestinal: Negative for abdominal pain, diarrhea and nausea.   Genitourinary: Negative.  Negative for frequency and hematuria.   Musculoskeletal: Positive for back pain.   Skin: Negative.  Negative for rash.   Neurological: Negative.  Negative for headaches.   Hematological: Negative for  adenopathy.   Psychiatric/Behavioral: Negative.  The patient is not nervous/anxious.        Wt Readings from Last 3 Encounters:   03/19/18 86.8 kg (191 lb 5.8 oz)   03/12/18 86.8 kg (191 lb 5.8 oz)   03/02/18 84.2 kg (185 lb 10 oz)     Temp Readings from Last 3 Encounters:   03/19/18 97.9 °F (36.6 °C) (Oral)   03/13/18 97.9 °F (36.6 °C)   03/12/18 98.6 °F (37 °C) (Oral)     BP Readings from Last 3 Encounters:   03/19/18 (!) 92/52   03/13/18 (!) 90/43   03/12/18 120/70     Pulse Readings from Last 3 Encounters:   03/19/18 79   03/13/18 72   03/12/18 85       Objective:      Physical Exam   Constitutional: He is oriented to person, place, and time. He appears well-developed and well-nourished.   He comes in a wheelchair with portable 02   HENT:   Head: Normocephalic.   Mouth/Throat: No oropharyngeal exudate.   Eyes: Pupils are equal, round, and reactive to light.   Neck: No thyromegaly present.   Cardiovascular: Normal rate, regular rhythm and normal heart sounds.  Exam reveals no gallop.    No murmur heard.  Pulmonary/Chest: No respiratory distress. He has no wheezes. He has no rales.   Abdominal: Soft. Bowel sounds are normal. He exhibits no distension and no mass. There is no rebound and no guarding.   Musculoskeletal: Normal range of motion. He exhibits no edema.   Lymphadenopathy:     He has no cervical adenopathy.   Neurological: He is alert and oriented to person, place, and time.   Skin: Skin is warm and dry.   Psychiatric: He has a normal mood and affect. His behavior is normal.       Wt Readings from Last 3 Encounters:   03/19/18 86.8 kg (191 lb 5.8 oz)   03/12/18 86.8 kg (191 lb 5.8 oz)   03/02/18 84.2 kg (185 lb 10 oz)     Temp Readings from Last 3 Encounters:   03/19/18 97.9 °F (36.6 °C) (Oral)   03/13/18 97.9 °F (36.6 °C)   03/12/18 98.6 °F (37 °C) (Oral)     BP Readings from Last 3 Encounters:   03/19/18 (!) 92/52   03/13/18 (!) 90/43   03/12/18 120/70     Pulse Readings from Last 3 Encounters:    03/19/18 79   03/13/18 72   03/12/18 85       Assessment:       1. Multiple myeloma in remission    2. Other vitamin B12 deficiency anemia    3. Obesity, Class II, BMI 35-39.9, with comorbidity    4. Lumbar radiculitis        Plan:       Lab Results   Component Value Date    WBC 9.88 03/19/2018    HGB 9.7 (L) 03/19/2018    HCT 32.4 (L) 03/19/2018     (H) 03/19/2018     03/19/2018     His hgb and platelet count has improvex.  We will restart revlimid but a lower dose of 5 mg day 1-21 then 7 days off  See me and nurses in 4 weeks with a cbc, cmp, ferritin, b12,spep, free light chains b12 and folate levels.

## 2018-03-21 ENCOUNTER — OFFICE VISIT (OUTPATIENT)
Dept: PULMONOLOGY | Facility: CLINIC | Age: 83
End: 2018-03-21
Payer: MEDICARE

## 2018-03-21 ENCOUNTER — TELEPHONE (OUTPATIENT)
Dept: PULMONOLOGY | Facility: CLINIC | Age: 83
End: 2018-03-21

## 2018-03-21 VITALS
DIASTOLIC BLOOD PRESSURE: 52 MMHG | WEIGHT: 193.56 LBS | BODY MASS INDEX: 30.38 KG/M2 | HEART RATE: 77 BPM | HEIGHT: 67 IN | OXYGEN SATURATION: 95 % | RESPIRATION RATE: 26 BRPM | SYSTOLIC BLOOD PRESSURE: 100 MMHG

## 2018-03-21 DIAGNOSIS — J44.9 CHRONIC OBSTRUCTIVE PULMONARY DISEASE, UNSPECIFIED COPD TYPE: ICD-10-CM

## 2018-03-21 DIAGNOSIS — I50.9 CONGESTIVE HEART FAILURE, UNSPECIFIED CONGESTIVE HEART FAILURE CHRONICITY, UNSPECIFIED CONGESTIVE HEART FAILURE TYPE: Primary | ICD-10-CM

## 2018-03-21 DIAGNOSIS — J84.10 PULMONARY FIBROSIS, POSTINFLAMMATORY: ICD-10-CM

## 2018-03-21 DIAGNOSIS — J96.11 CHRONIC RESPIRATORY FAILURE WITH HYPOXIA: ICD-10-CM

## 2018-03-21 DIAGNOSIS — G47.33 OSA ON CPAP: ICD-10-CM

## 2018-03-21 DIAGNOSIS — R60.0 LOCALIZED EDEMA: ICD-10-CM

## 2018-03-21 DIAGNOSIS — C90.01 MULTIPLE MYELOMA IN REMISSION: ICD-10-CM

## 2018-03-21 PROCEDURE — 99215 OFFICE O/P EST HI 40 MIN: CPT | Mod: S$GLB,,, | Performed by: INTERNAL MEDICINE

## 2018-03-21 PROCEDURE — 3078F DIAST BP <80 MM HG: CPT | Mod: CPTII,S$GLB,, | Performed by: INTERNAL MEDICINE

## 2018-03-21 PROCEDURE — 99999 PR PBB SHADOW E&M-EST. PATIENT-LVL III: CPT | Mod: PBBFAC,,, | Performed by: INTERNAL MEDICINE

## 2018-03-21 PROCEDURE — 3074F SYST BP LT 130 MM HG: CPT | Mod: CPTII,S$GLB,, | Performed by: INTERNAL MEDICINE

## 2018-03-21 PROCEDURE — 99499 UNLISTED E&M SERVICE: CPT | Mod: S$GLB,,, | Performed by: INTERNAL MEDICINE

## 2018-03-21 RX ORDER — FLUTICASONE PROPIONATE 50 MCG
2 SPRAY, SUSPENSION (ML) NASAL DAILY
Qty: 3 BOTTLE | Refills: 3 | Status: SHIPPED | OUTPATIENT
Start: 2018-03-21 | End: 2018-03-21 | Stop reason: SDUPTHER

## 2018-03-21 RX ORDER — FUROSEMIDE 40 MG/1
40 TABLET ORAL 2 TIMES DAILY
Qty: 180 TABLET | Refills: 4 | Status: SHIPPED | OUTPATIENT
Start: 2018-03-21 | End: 2018-03-21 | Stop reason: SDUPTHER

## 2018-03-21 RX ORDER — SPIRONOLACTONE 50 MG/1
50 TABLET, FILM COATED ORAL 2 TIMES DAILY
Qty: 180 TABLET | Refills: 3 | Status: SHIPPED | OUTPATIENT
Start: 2018-03-21

## 2018-03-21 RX ORDER — PROMETHAZINE HYDROCHLORIDE AND DEXTROMETHORPHAN HYDROBROMIDE 6.25; 15 MG/5ML; MG/5ML
5 SYRUP ORAL EVERY 6 HOURS PRN
Qty: 473 ML | Refills: 3 | Status: SHIPPED | OUTPATIENT
Start: 2018-03-21

## 2018-03-21 RX ORDER — FUROSEMIDE 40 MG/1
40 TABLET ORAL 2 TIMES DAILY
Qty: 180 TABLET | Refills: 4 | Status: SHIPPED | OUTPATIENT
Start: 2018-03-21

## 2018-03-21 RX ORDER — IPRATROPIUM BROMIDE AND ALBUTEROL SULFATE 2.5; .5 MG/3ML; MG/3ML
3 SOLUTION RESPIRATORY (INHALATION) EVERY 6 HOURS
Qty: 360 VIAL | Refills: 3 | Status: SHIPPED | OUTPATIENT
Start: 2018-03-21 | End: 2019-03-21

## 2018-03-21 RX ORDER — ALBUTEROL SULFATE 90 UG/1
2 AEROSOL, METERED RESPIRATORY (INHALATION) EVERY 6 HOURS PRN
Qty: 3 INHALER | Refills: 3 | Status: SHIPPED | OUTPATIENT
Start: 2018-03-21 | End: 2018-03-21 | Stop reason: SDUPTHER

## 2018-03-21 RX ORDER — ALBUTEROL SULFATE 90 UG/1
2 AEROSOL, METERED RESPIRATORY (INHALATION) EVERY 6 HOURS PRN
Qty: 3 INHALER | Refills: 3 | Status: SHIPPED | OUTPATIENT
Start: 2018-03-21

## 2018-03-21 RX ORDER — FLUTICASONE PROPIONATE 50 MCG
2 SPRAY, SUSPENSION (ML) NASAL DAILY
Qty: 3 BOTTLE | Refills: 3 | Status: SHIPPED | OUTPATIENT
Start: 2018-03-21

## 2018-03-21 NOTE — PROGRESS NOTES
Subjective:       Patient ID: Lorne Roe is a 83 y.o. male.    Chief Complaint:   He   presents for evaluation and treatment of Congestive Heart failure and Atrial Fibrillation  after being discharged from the hospital  1  month ago. Since discharge symptoms have unchanged course since that time. Patient denies fever or chills. Symptoms are aggravated by activity. Symptoms improve with rest.  Respiratory: positive for cough and dyspnea on exertion; Cardiovascular: positive for fatigue, palpitations and lower extremity edema.  Patient currently is on oxygen at 2 L/min per nasal cannula..    Chronic Obstructive Pulmonary Disease/ Chronic Respiratory failure:  He presents for evaluation and treatment of COPD. The patient is currently having symptoms / an exacerbation. Current symptoms include acute dyspnea, chronic dyspnea, cough productive of white sputum in small amounts and wheezing. Symptoms have been present since several days ago and have been gradually worsening. He denies chills and fever. Associated symptoms include poor exercise tolerance and shortness of breath.  This episode appears to have been triggered by no identifiable factor. Treatments tried for the current exacerbation: albuterol inhaler. The patient has been having similar episodes for approximately 10 years.   He uses 1 pillows at night. Patient currently is on home oxygen therapy.. The patient is having no constitutional symptoms, denying fever, chills, anorexia, or weight loss. The patient has been hospitalized for this condition before. He quit smoking approximately a number years ago. The patient is experiencing exercise intolerance (difficulty walking 1 blocks on flat ground).     Toe amputated 1 month ago at [facility]      Obstructive Sleep Apnea:  Patient is using CPAP as prescribed and benefiting from therapy.   Patient has complaints of skin irritation      MEDICAL RECORDS FROM THE HOSPITAL  REVIEWED:  Yvon Daniel MD - 03/08/2018 12:04 PM CST  Formatting of this note may be different from the original.  Admit Date 3/5/2018   Discharge Date 3/9/2018   Location LA-4128/1   Treatment Team Primary Care Physician: Len Aaron MD  Discharging Physician: Yvon Daniel MD  Treatment Team:   Hospitalist: [facility] Pm #[phone] [id number] ([facility] 13-15)  Hospitalist: [facility] Day Team #14 [id number]  Hospitalist: Hospital Medicine Service  Occupational Therapist: Jared Rowe OT  Admitting Provider: Gilmar Reese MD       Reason for Hospitalization   Epistaxis and left lower extremity cellulitis    Epistaxis  Mr. Roe is an 83-year-old male with past medical history significant for chronic hypoxic respiratory failure on continuous O2 by NC, chronic diastolic heart failure, moderate aortic stenosis, advanced COPD, CAD, paroxysmal A. fib, HTN, hypothyroidism and multiple myeloma who presented to ED with nosebleed. Patient was last hospitalized in late January due to acute on chronic hypoxemic respiratory failure due to viral infection as well as diastolic heart failure. Since that time patient states his breathing had been stable and remains at his baseline. He presented after developing a left-sided nosebleed beginning approximately 8 AM and lasting throughout the day. Patient is on chronic anticoagulation for atrial fib on Xarelto. Also has thrombocytopenia due to multiple myeloma for which he takes Revlimid. Revlimid was recently discontinued by a psychologist due to low platelet counts. He was seen by ENT in the ED. He had left sided nasal packing placed. Patient was admitted for epistaxis with close monitoring. His Xarelto would be held. Patient's H&H was relatively stable during his hospital course. His platelet counts showed some improvement as well. ENT removed nasal packing. He was without recurrence of epistaxis. ENT cleared to resume anticoagulation with Xarelto. He will follow-up closely with his  hematologist/oncologist at [facility] for multiple myeloma.    * Cellulitis and abscess of left leg  Patient sustained a significant scratch to his left lower extremity anterior shin 2 weeks prior to presentation. He was receiving wound care by home health nurse. Patient and family noted poor healing with drainage and erythema. Evaluation in the ED was concerning for cellulitis. He was covered with broad-spectrum antibiotics upfront with vancomycin and Unasyn. He tolerated antibiotics well with improvement in erythema, warmth, swelling and drainage. He did receive wound care during his hospital course. Asked social work to send wound care recommendations to home health agency. He will be transitioned to oral Augmentin at discharge. Of note patient did have urine culture with growth of Proteus 10-50,000 CFU. Less suspicion of UTI as patient is asymptomatic. He was without significant leukocytosis or fever during his hospital course. We will direct antibiotic treatment towards cellulitis and wound infection. Continue wound care with home health.    Multiple myeloma in remission (HCC)  Patient may take off Revlimid by his oncologist. Platelet counts have shown some improvement since discontinuation. His H&H did have slight downward trend but likely dilutional due to receiving IV fluids shortly after admission. H&H has otherwise been stable. Recommend outpatient follow-up with his oncologist as scheduled.    Thrombocytopenia (HCC)  As above    Chronic diastolic congestive heart failure ([facility])  Patient is compensated from heart failure standpoint at the time of discharge. We are resuming his diuretic therapy with Lasix and spironolactone.    Chronic respiratory failure ([facility])  Patient at presentation was at his baseline respiratory status. This was complicated though due to epistaxis and requirements of nasal packing. He was placed on oxygen by mask. Once packing was removed he was resumed on his usual 3 L oxygen by nasal  cannula.    Acute kidney injury (HCC)  Patient did have evidence of acute kidney injury of presentation with creatinine near 1.6. His diuretics were held he was given gentle IV fluids overnight. Renal function improved to likely baseline ranges. He has been resumed on his diuretics Lasix and spironolactone at time of discharge.    Final Medication List     ACTIVE     acyclovir 200 mg capsule   Commonly known as: ZOVIRAX   400 mg, Oral, BID     ALBUTEROL INHL   2 Inhalers, Inhalation, 4x Daily PRN     amoxicillin-pot clavulanate 875-125 mg per tablet   Commonly known as: AUGMENTIN   1 tablet, Oral, BID     ANORO ELLIPTA 62.5-25 mcg/actuation Dsdv   Generic drug: umeclidinium-vilanterol   USE 1 INHALATION DAILY     atenolol 50 mg tablet   Commonly known as: TENORMIN   25 mg, Oral, Daily     atorvastatin 20 mg tablet   Commonly known as: LIPITOR   10 mg, Oral, Daily     B-12 COMPLIANCE INJ   1 Dose, Injection, Q30 Days     buPROPion 75 mg tablet   Commonly known as: WELLBUTRIN   75 mg, Oral, Daily     clonazePAM 0.5 mg tablet   Commonly known as: KlonoPIN   0.5 mg, Oral, Daily     folic acid 1 mg tablet   Commonly known as: FOLVITE   1 mg, Oral, Daily     furosemide 40 mg tablet   Commonly known as: LASIX   40 mg, Oral, BID     hydrOXYzine 10 mg tablet   Commonly known as: ATARAX   10 mg, Oral, TID PRN     ipratropium-albuterol 0.5-2.5 mg/3 mL nebulizer solution   Commonly known as: DUO-NEB   3 mLs, Nebulization, 4x Daily     levothyroxine 25 mcg tablet   Commonly known as: SYNTHROID, LEVOTHROID   25 mcg, Oral, Daily     midodrine 5 mg tablet   Commonly known as: PROAMATINE   5 mg, Oral, BID     mirtazapine 15 mg tablet   Commonly known as: REMERON   15 mg, Oral, Nightly     nitroglycerin 0.4 mg SL tablet   Commonly known as: NITROSTAT   0.4 mg, Sublingual, Q5 Min PRN     oxyCODONE-acetaminophen  mg per tablet   Commonly known as: PERCOCET   1 tablet, Oral, Q4H PRN     pantoprazole 40 mg tablet   Commonly known  as: PROTONIX   40 mg, Oral, Daily     potassium chloride 10 mEq CR tablet   Commonly known as: KLOR-CON   20 mEq, Oral, BID     spironolactone 50 mg tablet   Commonly known as: ALDACTONE   50 mg, Oral, Daily     tamsulosin 0.4 mg Cp24   Commonly known as: FLOMAX   0.4 mg, Oral, Daily     TIKOSYN ORAL   125 mcg, Oral, BID     VITAMIN D2 50,000 unit capsule   Generic drug: ergocalciferol   50,000 Units, Oral, Weekly, Every Friday     XARELTO ORAL   15 mg, Oral, Daily         Significant Medication Changes:   New medication  Augmentin      Condition: Stable, per my exam    Disposition: Home with Home Health    Time Spent on Discharge: Greater than 30 minutes.    Quality: Influenza vaccine is up to date. Ejection Fraction assessment completed in the past 12 months. Last Ejection Fraction assessment was done on 8/15/2017 via ECHO has an EF of 55%. CHF Beta Blockers prescribed for congestive heart failure. Patient has a scheduled appointment within the next seven days for congestive heart failure follow up.         Scheduled Appts   Follow up with Len Aaron MD   Bring paperwork to [facility] to schedule hospital follow up visit.   Phone: [phone]   Where: [address] [address]       Wednesday Apr 04, 2018   Follow up with Arley Cool MD   Appointment at 2:15 p.m.   Phone: [phone]   Where: [address], [facility] [address] [address]             Diet   Discharge Diet   Patient Type: Adult - Diet   Diet-Adult Home Diet Type: Cardiac           Activity   Discharge Activity   Instructions: Other   Activities of Daily Living: with supervision   Mobility Restrictions: [place]         Other   Call MD   Call MD for: Worsening symptoms     Discharge Condition   Condition: Stable     Discharge Follow-Up   Follow up with PCP in 1 week  Follow up with [facility] as scheduled tomorrow for Multiple myeloma.   Follow up with Dr Cool Cardiology in 1-2 weeks.  "    Discharge instructions   Nasal saline rinses 3-4 times per day as well as a saline gel spray (such as A[place]) to help prevent drying.         Yvon Daniel MD   3/9/2018 4:43 PM  Portions of this note may have been created with voice recognition software. Occasional "wrong-word" or "sound-a-like" substitutions may have occurred due to the inherent limitations of voice recognition software. Please, read the note carefully and recognize, using context, where substitutions have               COPD    HPI  Past Medical History:   Diagnosis Date    Acute coronary syndrome     Atrial fibrillation     B12 deficiency anemia     Carotid artery occlusion     CHF (congestive heart failure)     COPD (chronic obstructive pulmonary disease)     Coronary artery disease     s/p cabg[facility] cardiac stents [facility]    Depression     Hyperlipidemia     Hypertension     Multiple myeloma     Osteopenia     Paraproteinemia     Dr alvarado    PVD (peripheral vascular disease)     Dr Argueta    Sleep apnea     Vitamin D deficiency disease      Past Surgical History:   Procedure Laterality Date    BACK SURGERY      CAROTID ENDARTERECTOMY Left 12/17/13    left    CORONARY ANGIOPLASTY WITH STENT PLACEMENT      CORONARY ARTERY BYPASS GRAFT      stent/guidand multi-link duet-safe to 1.5T    NECK SURGERY       Social History     Social History    Marital status:      Spouse name: N/A    Number of children: 0    Years of education: N/A     Occupational History    Not on file.     Social History Main Topics    Smoking status: Former Smoker    Smokeless tobacco: Never Used    Alcohol use No    Drug use: No    Sexual activity: No     Other Topics Concern    Not on file     Social History Narrative    Lives with spouse at home, disabled since 2003     Review of Systems   Constitutional: Positive for fatigue. Negative for fever.   HENT: Positive for postnasal drip, rhinorrhea and congestion.  "   Eyes: Negative for redness and itching.   Respiratory: Positive for cough, shortness of breath, dyspnea on extertion, use of rescue inhaler and Paroxysmal Nocturnal Dyspnea. Negative for sputum production.    Cardiovascular: Positive for palpitations and leg swelling. Negative for chest pain.   Genitourinary: Negative for difficulty urinating and hematuria.   Endocrine: Negative for cold intolerance and heat intolerance.    Musculoskeletal: Positive for arthralgias.   Skin: Negative for rash.   Gastrointestinal: Negative for nausea and abdominal pain.   Neurological: Negative for dizziness, syncope, weakness and light-headedness.   Hematological: Negative for adenopathy. Does not bruise/bleed easily.   Psychiatric/Behavioral: Positive for sleep disturbance. The patient is not nervous/anxious.        Objective:      Physical Exam   Constitutional: He is oriented to person, place, and time. He appears well-developed and well-nourished.   HENT:   Head: Normocephalic and atraumatic.   Eyes: Conjunctivae are normal. Pupils are equal, round, and reactive to light.   Neck: Neck supple. JVD present. No tracheal deviation present. No thyromegaly present.   Cardiovascular: Normal rate.  An irregularly irregular rhythm present.   Murmur heard.   Systolic murmur is present with a grade of 2/6   Pulmonary/Chest: Effort normal. He has decreased breath sounds. He has rales in the right lower field and the left lower field.   Abdominal: Soft.   Musculoskeletal: Normal range of motion. He exhibits edema.   Lymphadenopathy:     He has no cervical adenopathy.   Neurological: He is alert and oriented to person, place, and time.   Skin: Skin is warm and dry.   Nursing note and vitals reviewed.    Personal Diagnostic Review  Chest x-ray: no recent  .[id number]  No flowsheet data found.        Assessment:       1. Congestive heart failure, unspecified congestive heart failure chronicity, unspecified congestive heart failure type    2.  Localized edema    3. Multiple myeloma in remission    4. Chronic obstructive pulmonary disease, unspecified COPD type    5. [place] on CPAP    6. Chronic respiratory failure with hypoxia    7. Pulmonary fibrosis, postinflammatory        Outpatient Encounter Prescriptions as of 3/21/2018   Medication Sig Dispense Refill    acyclovir (ZOVIRAX) 200 MG capsule Take 400 mg by mouth.      albuterol 90 mcg/actuation inhaler Inhale 2 puffs into the lungs every 6 (six) hours as needed for Shortness of Breath. 3 Inhaler 3    albuterol-ipratropium 2.5mg-0.5mg/3mL (DUO-NEB) 0.5 mg-3 mg(2.5 mg base)/3 mL nebulizer solution Take 3 mLs by nebulization every 6 (six) hours. 360 vial 3    ammonium lactate (AMLACTIN) 12 % lotion Use daily.  Apply to damp skin after bathing. 225 g 11    aspirin (ECOTRIN) 81 MG EC tablet Take 1 tablet (81 mg total) by mouth once daily. 30 tablet 0    atenolol (TENORMIN) 50 MG tablet Take 50 mg by mouth once daily. 50mg in am and 25mg at night      atorvastatin (LIPITOR) 20 MG tablet Take 10 mg by mouth.      buPROPion (WELLBUTRIN) 75 MG tablet Take 75 mg by mouth.      clonazePAM (KLONOPIN) 0.5 MG tablet Take 1 tablet (0.5 mg total) by mouth once daily. 30 tablet 0    clopidogrel (PLAVIX) 75 mg tablet Take 1 tablet (75 mg total) by mouth once daily. 30 tablet 0    clotrimazole (LOTRIMIN) 1 % cream Apply topically as needed. 1 Tube 3    cyanocobalamin 1,000 mcg/mL injection Inject 1 mL (1,000 mcg total) into the muscle every 30 days. 10 mL 1    dexamethasone (DECADRON) 4 MG Tab 5 tablets po bid one day a week every week 40 tablet 6    docusate sodium (COLACE) 100 MG capsule Take 1 capsule (100 mg total) by mouth 2 (two) times daily. 60 capsule 0    dofetilide (TIKOSYN) 125 MCG Cap Take by mouth.      ergocalciferol (ERGOCALCIFEROL) 50,000 unit Cap Take 1 capsule (50,000 Units total) by mouth every 7 days. 4 capsule 0    ergocalciferol (ERGOCALCIFEROL) 50,000 unit Cap TAKE 1 CAPSULE BY MOUTH  EVERY 7 DAYS 4 capsule 0    fluticasone (FLONASE) 50 mcg/actuation nasal spray 2 sprays (100 mcg total) by Each Nare route once daily. 3 Bottle 3    folic acid (FOLVITE) 1 MG tablet Take 1 mg by mouth.      furosemide (LASIX) 40 MG tablet Take 1 tablet (40 mg total) by mouth 2 (two) times daily. 180 tablet 4    guaiFENesin (MUCINEX) 600 mg 12 hr tablet Take 600 mg by mouth.      hydrOXYzine HCl (ATARAX) 10 MG Tab Take 10 mg by mouth.      lenalidomide (REVLIMID) 5 mg Cap Take one tablet a day from day 1 to day 21 then 7 days off 21 each 1    levothyroxine (SYNTHROID) 25 MCG tablet Take 25 mcg by mouth.      magnesium oxide (MAG-OX) 400 mg tablet Take 1 tablet (400 mg total) by mouth 2 (two) times daily. 60 tablet 2    midodrine (PROAMATINE) 5 MG Tab Take 5 mg by mouth.      mirtazapine (REMERON) 15 MG tablet Take 15 mg by mouth every evening.      nitroGLYCERIN (NITROSTAT) 0.4 MG SL tablet Place 1 tablet (0.4 mg total) under the tongue every 5 (five) minutes as needed. 30 tablet 3    nystatin (MYCOSTATIN) cream Apply topically 2 (two) times daily. 30 g 0    ondansetron (ZOFRAN) 4 MG tablet Take 1 tablet (4 mg total) by mouth every 8 (eight) hours as needed for Nausea. 20 tablet 0    oxyCODONE-acetaminophen (PERCOCET)  mg per tablet Take 1 tablet by mouth every 6 (six) hours as needed for Pain. 60 tablet 0    pantoprazole (PROTONIX) 40 MG tablet Take 1 tablet (40 mg total) by mouth once daily. 30 tablet 6    promethazine-dextromethorphan (PROMETHAZINE-DM) 6.25-15 mg/5 mL Syrp Take 5 mLs by mouth every 6 (six) hours as needed (x cough). 473 mL 3    rivaroxaban (XARELTO) 10 mg Tab Take 15 mg by mouth.      rosuvastatin (CRESTOR) 10 MG tablet Take 1 tablet (10 mg total) by mouth every evening. 30 tablet 0    spironolactone (ALDACTONE) 50 MG tablet Take 1 tablet (50 mg total) by mouth 2 (two) times daily. 180 tablet 3    tamsulosin (FLOMAX) 0.4 mg Cp24 Take 0.4 mg by mouth.       umeclidinium-vilanterol (ANORO ELLIPTA) 62.5-25 mcg/actuation DsDv Inhale 1 puff into the lungs once daily. Controller 180 each 3    XARELTO 15 mg Tab Take 15 mg by mouth after dinner.   11    [DISCONTINUED] albuterol 90 mcg/actuation inhaler Inhale 2 puffs into the lungs every 6 (six) hours as needed for Shortness of Breath. 3 Inhaler 3    [DISCONTINUED] albuterol 90 mcg/actuation inhaler Inhale 2 puffs into the lungs every 6 (six) hours as needed for Shortness of Breath. 3 Inhaler 3    [DISCONTINUED] fluticasone (FLONASE) 50 mcg/actuation nasal spray 1 spray by Each Nare route once daily. 1 Bottle 0    [DISCONTINUED] fluticasone (FLONASE) 50 mcg/actuation nasal spray 2 sprays (100 mcg total) by Each Nare route once daily. 3 Bottle 3    [DISCONTINUED] fluticasone-salmeterol 250-50 mcg/dose (ADVAIR DISKUS) 250-50 mcg/dose diskus inhaler Inhale 1 puff into the lungs 2 (two) times daily. 180 each 3    [DISCONTINUED] furosemide (LASIX) 40 MG tablet Take 1 tablet (40 mg total) by mouth 2 (two) times daily. 180 tablet 4    [DISCONTINUED] furosemide (LASIX) 40 MG tablet Take 1 tablet (40 mg total) by mouth 2 (two) times daily. 180 tablet 4    [DISCONTINUED] levoFLOXacin (LEVAQUIN) 500 MG tablet Take 1 tablet (500 mg total) by mouth once daily. 10 tablet 1    [DISCONTINUED] meloxicam (MOBIC) 7.5 MG tablet Take 7.5 mg by mouth.      [DISCONTINUED] potassium chloride (KLOR-CON) 10 MEQ TbSR Take 20 mEq by mouth.      [DISCONTINUED] predniSONE (DELTASONE) 20 MG tablet Prednisone 60 mg/ day for 3 days, 40 mg/day for 3 days,20 mg/ day for 3 days, (1/2 tablet )10 mg a day for 3 days. 20 tablet 1    [DISCONTINUED] promethazine-dextromethorphan (PROMETHAZINE-DM) 6.25-15 mg/5 mL Syrp Take 5 mLs by mouth every 6 (six) hours as needed (x cough). 473 mL 3    [DISCONTINUED] spironolactone (ALDACTONE) 50 MG tablet TAKE 1 TABLET ONCE DAILY 90 tablet 0    [DISCONTINUED] umeclidinium-vilanterol (ANORO ELLIPTA) 62.5-25  mcg/actuation DsDv USE 1 INHALATION DAILY       No facility-administered encounter medications on file as of 3/21/2018.      Orders Placed This Encounter   Procedures    CPAP/BIPAP SUPPLIES     Needs nasal gel pad for skin irritation  [facility] for CPAP/Oxygen/Nebulizer supplies.  Customer Service: [phone]  Call: [phone]  Fax: [phone]  Billing Inquiries: [phone] or [phone]     Order Specific Question:   Type of mask:     Answer:   FFM     Order Specific Question:   Headgear?     Answer:   Yes     Order Specific Question:   Tubing?     Answer:   Yes     Order Specific Question:   Humidifier chamber?     Answer:   Yes     Order Specific Question:   Chin strap?     Answer:   Yes     Order Specific Question:   Filters?     Answer:   Yes     Order Specific Question:   Other supplies:     Answer:   90 day supply with 4 refills     Order Specific Question:   Length of need (1-99 months):     Answer:   99     Order Specific Question:   Vendor:     Answer:   Other (use comments)     Order Specific Question:   Expected Date of Delivery:     Answer:   3/23/2018    X-Ray Chest PA And Lateral     Standing Status:   Future     Standing Expiration Date:   3/21/2019     Order Specific Question:   May the Radiologist modify the order per protocol to meet the clinical needs of the patient?     Answer:   Yes     Plan:       Requested Prescriptions     Signed Prescriptions Disp Refills    albuterol-ipratropium 2.5mg-0.5mg/3mL (DUO-NEB) 0.5 mg-3 mg(2.5 mg base)/3 mL nebulizer solution 360 vial 3     Sig: Take 3 mLs by nebulization every 6 (six) hours.    spironolactone (ALDACTONE) 50 MG tablet 180 tablet 3     Sig: Take 1 tablet (50 mg total) by mouth 2 (two) times daily.    promethazine-dextromethorphan (PROMETHAZINE-DM) 6.25-15 mg/5 mL Syrp 473 mL 3     Sig: Take 5 mLs by mouth every 6 (six) hours as needed (x cough).    umeclidinium-vilanterol (ANORO ELLIPTA) 62.5-25 mcg/actuation DsDv 180 each 3      Sig: Inhale 1 puff into the lungs once daily. Controller    albuterol 90 mcg/actuation inhaler 3 Inhaler 3     Sig: Inhale 2 puffs into the lungs every 6 (six) hours as needed for Shortness of Breath.    fluticasone (FLONASE) 50 mcg/actuation nasal spray 3 Bottle 3     Si sprays (100 mcg total) by Each Nare route once daily.    furosemide (LASIX) 40 MG tablet 180 tablet 4     Sig: Take 1 tablet (40 mg total) by mouth 2 (two) times daily.     Congestive heart failure, unspecified congestive heart failure chronicity, unspecified congestive heart failure type  -     spironolactone (ALDACTONE) 50 MG tablet; Take 1 tablet (50 mg total) by mouth 2 (two) times daily.  Dispense: 180 tablet; Refill: 3  -     X-Ray Chest PA And Lateral; Future; Expected date: 2018    Localized edema  -     Discontinue: furosemide (LASIX) 40 MG tablet; Take 1 tablet (40 mg total) by mouth 2 (two) times daily.  Dispense: 180 tablet; Refill: 4  -     furosemide (LASIX) 40 MG tablet; Take 1 tablet (40 mg total) by mouth 2 (two) times daily.  Dispense: 180 tablet; Refill: 4    Multiple myeloma in remission  -     promethazine-dextromethorphan (PROMETHAZINE-DM) 6.25-15 mg/5 mL Syrp; Take 5 mLs by mouth every 6 (six) hours as needed (x cough).  Dispense: 473 mL; Refill: 3    Chronic obstructive pulmonary disease, unspecified COPD type  -     albuterol-ipratropium 2.5mg-0.5mg/3mL (DUO-NEB) 0.5 mg-3 mg(2.5 mg base)/3 mL nebulizer solution; Take 3 mLs by nebulization every 6 (six) hours.  Dispense: 360 vial; Refill: 3  -     Discontinue: albuterol 90 mcg/actuation inhaler; Inhale 2 puffs into the lungs every 6 (six) hours as needed for Shortness of Breath.  Dispense: 3 Inhaler; Refill: 3  -     Discontinue: fluticasone (FLONASE) 50 mcg/actuation nasal spray; 2 sprays (100 mcg total) by Each Nare route once daily.  Dispense: 3 Bottle; Refill: 3  -     umeclidinium-vilanterol (ANORO ELLIPTA) 62.5-25 mcg/actuation DsDv; Inhale 1 puff into  the lungs once daily. Controller  Dispense: 180 each; Refill: 3  -     X-Ray Chest PA And Lateral; Future; Expected date: 03/21/2018  -     albuterol 90 mcg/actuation inhaler; Inhale 2 puffs into the lungs every 6 (six) hours as needed for Shortness of Breath.  Dispense: 3 Inhaler; Refill: 3  -     fluticasone (FLONASE) 50 mcg/actuation nasal spray; 2 sprays (100 mcg total) by Each Nare route once daily.  Dispense: 3 Bottle; Refill: 3    [place] on CPAP  -     CPAP/BIPAP SUPPLIES    Chronic respiratory failure with hypoxia    Pulmonary fibrosis, postinflammatory           Follow-up in about 3 months (around 6/21/2018) for CXR.    Review of medical records from hospital. Medical records from hospital were reviewed during office visit - these included but were not limited to review of radiographic studies and /or radiologists reports, laboratory studies, discharge summaries, procedure notes, consultations and other transcribed notes.    MEDICAL DECISION MAKING: Moderate to high complexity.  Overall, the multiple problems listed are of moderate to high severity that may impact quality of life and activities of daily living. Side effects of medications, treatment plan as well as options and alternatives reviewed and discussed with patient. There was counseling of patient concerning these issues.    Total time spent in face to face counseling and coordination of care - 40 minutes over 50% of time was used in discussion of prognosis, risks, benefits of treatment, instructions and compliance with regimen . Discussion with other physicians or health care providers (homehealth, durable medical equipment providers).

## 2018-03-21 NOTE — TELEPHONE ENCOUNTER
Pt contacted to schedule follow up appointment due to pt leaving before staff could do so.    Home called. Phone not reachable.    Mobile called. Pt accepted [facility] appointment on 6/21/2018 at 1400.    Pt was instructed to arrive 15min prior to appointment to have xray conducted. Pt verbalized understanding.

## 2018-04-05 DIAGNOSIS — C90.01 MULTIPLE MYELOMA IN REMISSION: ICD-10-CM

## 2018-04-05 DIAGNOSIS — F41.9 ANXIETY: ICD-10-CM

## 2018-04-05 RX ORDER — OXYCODONE AND ACETAMINOPHEN 10; 325 MG/1; MG/1
1 TABLET ORAL EVERY 6 HOURS PRN
Qty: 60 TABLET | Refills: 0 | Status: SHIPPED | OUTPATIENT
Start: 2018-04-05

## 2018-04-05 RX ORDER — OXYCODONE AND ACETAMINOPHEN 10; 325 MG/1; MG/1
1 TABLET ORAL EVERY 6 HOURS PRN
Qty: 60 TABLET | Refills: 0 | Status: CANCELLED | OUTPATIENT
Start: 2018-04-05

## 2018-04-05 RX ORDER — CLONAZEPAM 0.5 MG/1
0.5 TABLET ORAL DAILY
Qty: 30 TABLET | Refills: 0 | Status: CANCELLED | OUTPATIENT
Start: 2018-04-05 | End: 2019-04-05

## 2018-04-05 RX ORDER — CLONAZEPAM 0.5 MG/1
0.5 TABLET ORAL DAILY
Qty: 30 TABLET | Refills: 0 | Status: SHIPPED | OUTPATIENT
Start: 2018-04-05 | End: 2019-04-05

## 2018-04-05 NOTE — TELEPHONE ENCOUNTER
----- Message from Michell Fisher sent at 4/5/2018  3:13 PM CDT -----  Contact: pt   Call pt regarding need refill on meds.     [phone] (home)

## 2018-04-09 DIAGNOSIS — C90.01 MULTIPLE MYELOMA IN REMISSION: ICD-10-CM

## 2018-04-09 RX ORDER — LENALIDOMIDE 5 MG/1
CAPSULE ORAL
Qty: 21 EACH | Refills: 1 | Status: SHIPPED | OUTPATIENT
Start: 2018-04-09 | End: 2018-05-14 | Stop reason: SDUPTHER

## 2018-04-16 ENCOUNTER — LAB VISIT (OUTPATIENT)
Dept: LAB | Facility: HOSPITAL | Age: 83
End: 2018-04-16
Attending: INTERNAL MEDICINE
Payer: MEDICARE

## 2018-04-16 DIAGNOSIS — D51.8 OTHER VITAMIN B12 DEFICIENCY ANEMIA: ICD-10-CM

## 2018-04-16 DIAGNOSIS — C90.01 MULTIPLE MYELOMA IN REMISSION: ICD-10-CM

## 2018-04-16 LAB
ALBUMIN SERPL BCP-MCNC: 2.9 G/DL
ALP SERPL-CCNC: 73 U/L
ALT SERPL W/O P-5'-P-CCNC: 11 U/L
ANION GAP SERPL CALC-SCNC: 9 MMOL/L
AST SERPL-CCNC: 11 U/L
BASOPHILS # BLD AUTO: 0.03 K/UL
BASOPHILS NFR BLD: 0.3 %
BILIRUB SERPL-MCNC: 0.5 MG/DL
BUN SERPL-MCNC: 23 MG/DL
CALCIUM SERPL-MCNC: 9.1 MG/DL
CHLORIDE SERPL-SCNC: 94 MMOL/L
CO2 SERPL-SCNC: 32 MMOL/L
CREAT SERPL-MCNC: 1.4 MG/DL
DIFFERENTIAL METHOD: ABNORMAL
EOSINOPHIL # BLD AUTO: 0.3 K/UL
EOSINOPHIL NFR BLD: 3.3 %
ERYTHROCYTE [DISTWIDTH] IN BLOOD BY AUTOMATED COUNT: 17.9 %
EST. GFR  (AFRICAN AMERICAN): 53 ML/MIN/1.73 M^2
EST. GFR  (NON AFRICAN AMERICAN): 46 ML/MIN/1.73 M^2
FERRITIN SERPL-MCNC: 136 NG/ML
FOLATE SERPL-MCNC: 10.3 NG/ML
GLUCOSE SERPL-MCNC: 112 MG/DL
HCT VFR BLD AUTO: 32 %
HGB BLD-MCNC: 9.7 G/DL
IRON SERPL-MCNC: 29 UG/DL
LYMPHOCYTES # BLD AUTO: 2.6 K/UL
LYMPHOCYTES NFR BLD: 25.9 %
MCH RBC QN AUTO: 31.8 PG
MCHC RBC AUTO-ENTMCNC: 30.3 G/DL
MCV RBC AUTO: 105 FL
MONOCYTES # BLD AUTO: 0.7 K/UL
MONOCYTES NFR BLD: 7.1 %
NEUTROPHILS # BLD AUTO: 6.3 K/UL
NEUTROPHILS NFR BLD: 63.7 %
PLATELET # BLD AUTO: 155 K/UL
PMV BLD AUTO: 9.8 FL
POTASSIUM SERPL-SCNC: 4.1 MMOL/L
PROT SERPL-MCNC: 7.2 G/DL
RBC # BLD AUTO: 3.05 M/UL
SATURATED IRON: 9 %
SODIUM SERPL-SCNC: 135 MMOL/L
TOTAL IRON BINDING CAPACITY: 306 UG/DL
TRANSFERRIN SERPL-MCNC: 207 MG/DL
VIT B12 SERPL-MCNC: 443 PG/ML
WBC # BLD AUTO: 10 K/UL

## 2018-04-16 PROCEDURE — 83520 IMMUNOASSAY QUANT NOS NONAB: CPT

## 2018-04-16 PROCEDURE — 82728 ASSAY OF FERRITIN: CPT

## 2018-04-16 PROCEDURE — 85025 COMPLETE CBC W/AUTO DIFF WBC: CPT | Mod: PO

## 2018-04-16 PROCEDURE — 84165 PROTEIN E-PHORESIS SERUM: CPT | Mod: 26,,, | Performed by: PATHOLOGY

## 2018-04-16 PROCEDURE — 36415 COLL VENOUS BLD VENIPUNCTURE: CPT | Mod: PO

## 2018-04-16 PROCEDURE — 82607 VITAMIN B-12: CPT

## 2018-04-16 PROCEDURE — 84165 PROTEIN E-PHORESIS SERUM: CPT

## 2018-04-16 PROCEDURE — 83540 ASSAY OF IRON: CPT

## 2018-04-16 PROCEDURE — 80053 COMPREHEN METABOLIC PANEL: CPT | Mod: PO

## 2018-04-16 PROCEDURE — 82746 ASSAY OF FOLIC ACID SERUM: CPT

## 2018-04-17 LAB
ALBUMIN SERPL ELPH-MCNC: 2.91 G/DL
ALPHA1 GLOB SERPL ELPH-MCNC: 0.46 G/DL
ALPHA2 GLOB SERPL ELPH-MCNC: 0.89 G/DL
B-GLOBULIN SERPL ELPH-MCNC: 1.66 G/DL
GAMMA GLOB SERPL ELPH-MCNC: 0.78 G/DL
KAPPA LC SER QL IA: 5.05 MG/DL
KAPPA LC/LAMBDA SER IA: 0.33
LAMBDA LC SER QL IA: 15.15 MG/DL
PATHOLOGIST INTERPRETATION SPE: NORMAL
PROT SERPL-MCNC: 6.7 G/DL

## 2018-04-18 ENCOUNTER — OFFICE VISIT (OUTPATIENT)
Dept: HEMATOLOGY/ONCOLOGY | Facility: CLINIC | Age: 83
End: 2018-04-18
Payer: MEDICARE

## 2018-04-18 ENCOUNTER — INFUSION (OUTPATIENT)
Dept: INFUSION THERAPY | Facility: HOSPITAL | Age: 83
End: 2018-04-18
Attending: INTERNAL MEDICINE
Payer: MEDICARE

## 2018-04-18 VITALS — RESPIRATION RATE: 18 BRPM | OXYGEN SATURATION: 94 % | HEIGHT: 67 IN | HEART RATE: 72 BPM | TEMPERATURE: 99 F

## 2018-04-18 DIAGNOSIS — J96.11 CHRONIC RESPIRATORY FAILURE WITH HYPOXIA: ICD-10-CM

## 2018-04-18 DIAGNOSIS — D51.8 OTHER VITAMIN B12 DEFICIENCY ANEMIA: Primary | ICD-10-CM

## 2018-04-18 DIAGNOSIS — C90.01 MULTIPLE MYELOMA IN REMISSION: Primary | ICD-10-CM

## 2018-04-18 PROCEDURE — 99214 OFFICE O/P EST MOD 30 MIN: CPT | Mod: S$GLB,,, | Performed by: INTERNAL MEDICINE

## 2018-04-18 PROCEDURE — 96372 THER/PROPH/DIAG INJ SC/IM: CPT | Mod: PO

## 2018-04-18 PROCEDURE — 99999 PR PBB SHADOW E&M-EST. PATIENT-LVL V: CPT | Mod: PBBFAC,,, | Performed by: INTERNAL MEDICINE

## 2018-04-18 PROCEDURE — 63600175 PHARM REV CODE 636 W HCPCS: Mod: PO | Performed by: INTERNAL MEDICINE

## 2018-04-18 RX ORDER — CYANOCOBALAMIN 1000 UG/ML
1000 INJECTION, SOLUTION INTRAMUSCULAR; SUBCUTANEOUS
Status: CANCELLED | OUTPATIENT
Start: 2018-04-18

## 2018-04-18 RX ORDER — CYANOCOBALAMIN 1000 UG/ML
1000 INJECTION, SOLUTION INTRAMUSCULAR; SUBCUTANEOUS
Status: DISCONTINUED | OUTPATIENT
Start: 2018-04-18 | End: 2018-04-18 | Stop reason: HOSPADM

## 2018-04-18 RX ADMIN — CYANOCOBALAMIN 1000 MCG: 1000 INJECTION, SOLUTION INTRAMUSCULAR at 01:04

## 2018-04-18 NOTE — PROGRESS NOTES
Subjective:       Patient ID: Lorne Roe is a 83 y.o. male.    Chief Complaint: No chief complaint on file.    HPI This is a 81 year-old  gentleman who comes today for follow up of his multiple. myeloma.  He is   known to us since 11/2008 because of mild anemia. Work up at that time showed vitamin B12 in the lower limits of normal. He was given vitamin B12 supplements once a month with improvement. In addition, a serum protein electrophoresis was reported as positive showing an IgA monoclonal spike of 1.18 g. Eventually it sisi to 1.3 g. He had x-rays of the skull and other bones all of which were negative. His UPEP was negative for a monoclonal spike. His chem 20 as normal and has remained with normal calcium, protein and creatinine tthorugh the years. He had a bone marrow aspiration and biopsy on 05/03/2010 because of his increase in the spike of serum protein electrophoresis. 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    Serum free light chains in 07/2009 were reported as increased mostly at the expense of the lambda chains. There were measured at 7.13. We felt that based on the absence of lytic lesions, hypercalcemia, renal failure or significant anemia, he had at best a smoldering myeloma that we could follow expectantly. \He has chronic back pain of the spine    Imaging studies on 03/17/2011 ordered by orthopedics showed some disk disease and degenerative joint disease but no evidence of myeloma.    H A repeat MRI of the LS spine on 8/2014 showed DJD changes and no obvious evidence of myeloma.  Back pain continues about the same     He has chronic SOB for which he follows up with dr Szymanski and is on home 02.  His lab test in October 2016 show anemia of  10.8  calcium is normal, total Serum protein WAS 10.  SPEp showed a monoclonal spike of 4.82 gr with lambda free light chains at 108 hE had a small monoclonal pspike represetning 12% of the protein in the  urine  He was asked to have a bone marrow. He was diagnosed as having active myeloma.> Biopsy was done at the [facility]. Report is in the MR MRdia section of EPIC.  Cytogenetics were ported as follows: 46XY, alonso(3), t(3;3), (P25, q21) , (15/46 XY (5)     He also had a positive T cell gene rearangement which raised the possibility of a concomitant T cell disorder. It was felt ,yeloma was the leading issue, and to treat him for it.   He s responded nicely to the initial treatment and was placed  on maintenance revlimid 10 mg po d1-21 q28 days.   His primary care is now at [facility].  He gets b12 Monthly injections at our office He remains SOb on around the clock 02  During the last visit he was found to have a hgb of  9.4  The revlimid was held and he was asked to see me in a week.  His SPEP is suppressed but his [facility] ahs dropped to 7.7  He received a unit of packed cells and his revlimid was held..  Eventually he wa palced back on relimid at 5 mg day-21 then 7days off. He comes to recheck his counts    ALLERGIES: see med card  MEDICATIONS: See Med Card.  PAST MEDICAL HISTORY:  1. Multiple myeloma.        2. Obesity.  3. Chronic back pain with degenerative disk disease.  4. Coronary artery disease, status post bypass and coronary stents and  previous heart attacks.  5. High blood pressure.  6. Elevated cholesterol.  7. Peripheral vascular disease -- carotid stenosis.  8. History of leg edema.  9. Arthritis.  10.COPD  11.b12  deficiency  Review of Systems   Constitutional: Negative.  Negative for fatigue.   Eyes: Negative.    Cardiovascular: Negative.  Negative for chest pain.   Gastrointestinal: Negative for abdominal pain and nausea.   Genitourinary: Negative.  Negative for hematuria.   Musculoskeletal: Negative.    Skin: Negative.    Neurological: Negative.    Psychiatric/Behavioral: Negative.        Objective:      Physical Exam   Constitutional: He is oriented to person, place, and time. He appears  well-developed and well-nourished.   In a wheelchair on portable oxygen   HENT:   Head: Normocephalic.   Mouth/Throat: No oropharyngeal exudate.   Eyes: Pupils are equal, round, and reactive to light.   Neck: No thyromegaly present.   Cardiovascular: Normal rate, regular rhythm and normal heart sounds.  Exam reveals no gallop.    No murmur heard.  Pulmonary/Chest: No respiratory distress. He has no wheezes. He has no rales.   Abdominal: Soft. Bowel sounds are normal. He exhibits no distension and no mass. There is no rebound and no guarding.   Musculoskeletal: Normal range of motion. He exhibits no edema.   Lymphadenopathy:     He has no cervical adenopathy.   Neurological: He is alert and oriented to person, place, and time.   Skin: Skin is warm and dry.   Psychiatric: He has a normal mood and affect. His behavior is normal.       Wt Readings from Last 3 Encounters:   03/21/18 87.8 kg (193 lb 9 oz)   03/19/18 86.8 kg (191 lb 5.8 oz)   03/12/18 86.8 kg (191 lb 5.8 oz)     Temp Readings from Last 3 Encounters:   04/18/18 98.5 °F (36.9 °C) (Oral)   03/19/18 97.9 °F (36.6 °C) (Oral)   03/13/18 97.9 °F (36.6 °C)     BP Readings from Last 3 Encounters:   03/21/18 (!) 100/52   03/19/18 (!) 92/52   03/13/18 (!) 90/43     Pulse Readings from Last 3 Encounters:   04/18/18 72   03/21/18 77   03/19/18 79       Assessment:       1. Multiple myeloma in remission    2. Chronic respiratory failure with hypoxia        Plan:       Lab Results   Component Value Date    WBC 10.00 04/16/2018    HGB 9.7 (L) 04/16/2018    HCT 32.0 (L) 04/16/2018     (H) 04/16/2018     04/16/2018       Lab Results   Component Value Date    CREATININE 1.4 04/16/2018     .  Lab Results   Component Value Date    ALT 11 04/16/2018    AST 11 04/16/2018    ALKPHOS 73 04/16/2018    BILITOT 0.5 04/16/2018     We will see him again in 4 weeks with a cbc and a cmp. We erik lmonitor the SPEp/free light chains

## 2018-04-18 NOTE — PATIENT INSTRUCTIONS
[facility]  [address]  [address] [facility]  [phone] phone     [phone] fax  Hours of Operation: Monday- Friday 8:00am- 5:00pm  After hours phone  [phone]  Hematology / Oncology Physicians on call      Dr. Tomi Del Toro                        Please call with any concerns regarding your appointment today.  FALL PREVENTION   Falls often occur due to slipping, tripping or losing your balance. Here are ways to reduce your risk of falling again.   Was there anything that caused your fall that can be fixed, removed or replaced?   Make your home safe by keeping walkways clear of objects you may trip over.   Use non-slip pads under rugs.   Do not walk in poorly lit areas.   Do not stand on chairs or wobbly ladders.   Use caution when reaching overhead or looking upward. This position can cause a loss of balance.   Be sure your shoes fit properly, have non-slip bottoms and are in good condition.   Be cautious when going up and down stairs, curbs, and when walking on uneven sidewalks.   If your balance is poor, consider using a cane or walker.   If your fall was related to alcohol use, stop or limit alcohol intake.   If your fall was related to use of sleeping medicines, talk to your doctor about this. You may need to reduce your dosage at bedtime if you awaken during the night to go to the bathroom.   To reduce the need for nighttime bathroom trips:   Avoid drinking fluids for several hours before going to bed   Empty your bladder before going to bed   Men can keep a urinal at the bedside   © 6962-3974 [employer], [address] [place] [address], [place] [address]. All rights reserved. This information is not intended as a substitute for professional medical care. Always follow your healthcare professional's instructions.

## 2018-04-19 RX ORDER — SODIUM CHLORIDE 0.9 % (FLUSH) 0.9 %
10 SYRINGE (ML) INJECTION
Status: CANCELLED | OUTPATIENT
Start: 2020-06-09

## 2018-04-19 RX ORDER — HEPARIN 100 UNIT/ML
500 SYRINGE INTRAVENOUS
Status: CANCELLED | OUTPATIENT
Start: 2020-06-09

## 2018-04-19 RX ORDER — CYANOCOBALAMIN 1000 UG/ML
1000 INJECTION, SOLUTION INTRAMUSCULAR; SUBCUTANEOUS
Status: CANCELLED | OUTPATIENT
Start: 2020-06-09

## 2018-04-20 RX ORDER — CYANOCOBALAMIN 1000 UG/ML
1000 INJECTION, SOLUTION INTRAMUSCULAR; SUBCUTANEOUS ONCE
Status: CANCELLED
Start: 2018-05-16

## 2018-05-01 DIAGNOSIS — E55.9 VITAMIN D DEFICIENCY DISEASE: ICD-10-CM

## 2018-05-01 RX ORDER — ERGOCALCIFEROL 1.25 MG/1
CAPSULE ORAL
Qty: 4 CAPSULE | Refills: 0 | OUTPATIENT
Start: 2018-05-01

## 2018-05-11 DIAGNOSIS — E55.9 VITAMIN D DEFICIENCY DISEASE: ICD-10-CM

## 2018-05-12 RX ORDER — ERGOCALCIFEROL 1.25 MG/1
50000 CAPSULE ORAL
Qty: 4 CAPSULE | Refills: 0 | OUTPATIENT
Start: 2018-05-12

## 2018-05-14 DIAGNOSIS — C90.01 MULTIPLE MYELOMA IN REMISSION: ICD-10-CM

## 2018-05-14 RX ORDER — LENALIDOMIDE 5 MG/1
CAPSULE ORAL
Qty: 21 EACH | Refills: 1 | Status: SHIPPED | OUTPATIENT
Start: 2018-05-14 | End: 2018-06-04 | Stop reason: SDUPTHER

## 2018-05-16 ENCOUNTER — OFFICE VISIT (OUTPATIENT)
Dept: HEMATOLOGY/ONCOLOGY | Facility: CLINIC | Age: 83
End: 2018-05-16
Payer: MEDICARE

## 2018-05-16 ENCOUNTER — LAB VISIT (OUTPATIENT)
Dept: LAB | Facility: HOSPITAL | Age: 83
End: 2018-05-16
Attending: INTERNAL MEDICINE
Payer: MEDICARE

## 2018-05-16 VITALS
HEART RATE: 78 BPM | TEMPERATURE: 98 F | SYSTOLIC BLOOD PRESSURE: 110 MMHG | DIASTOLIC BLOOD PRESSURE: 60 MMHG | OXYGEN SATURATION: 65 % | BODY MASS INDEX: 29.72 KG/M2 | RESPIRATION RATE: 18 BRPM | HEIGHT: 67 IN | WEIGHT: 189.38 LBS

## 2018-05-16 DIAGNOSIS — C90.01 MULTIPLE MYELOMA IN REMISSION: Primary | ICD-10-CM

## 2018-05-16 DIAGNOSIS — C90.01 MULTIPLE MYELOMA IN REMISSION: ICD-10-CM

## 2018-05-16 LAB
ALBUMIN SERPL BCP-MCNC: 3.3 G/DL
ALP SERPL-CCNC: 66 U/L
ALT SERPL W/O P-5'-P-CCNC: 9 U/L
ANION GAP SERPL CALC-SCNC: 16 MMOL/L
AST SERPL-CCNC: 9 U/L
BASOPHILS # BLD AUTO: 0.04 K/UL
BASOPHILS NFR BLD: 0.5 %
BILIRUB SERPL-MCNC: 0.7 MG/DL
BUN SERPL-MCNC: 18 MG/DL
CALCIUM SERPL-MCNC: 9.4 MG/DL
CHLORIDE SERPL-SCNC: 94 MMOL/L
CO2 SERPL-SCNC: 26 MMOL/L
CREAT SERPL-MCNC: 1.4 MG/DL
DIFFERENTIAL METHOD: ABNORMAL
EOSINOPHIL # BLD AUTO: 1 K/UL
EOSINOPHIL NFR BLD: 10.9 %
ERYTHROCYTE [DISTWIDTH] IN BLOOD BY AUTOMATED COUNT: 18.9 %
EST. GFR  (AFRICAN AMERICAN): 53 ML/MIN/1.73 M^2
EST. GFR  (NON AFRICAN AMERICAN): 46 ML/MIN/1.73 M^2
GLUCOSE SERPL-MCNC: 120 MG/DL
HCT VFR BLD AUTO: 32.1 %
HGB BLD-MCNC: 9.6 G/DL
LYMPHOCYTES # BLD AUTO: 1.7 K/UL
LYMPHOCYTES NFR BLD: 19.4 %
MCH RBC QN AUTO: 31.7 PG
MCHC RBC AUTO-ENTMCNC: 29.9 G/DL
MCV RBC AUTO: 106 FL
MONOCYTES # BLD AUTO: 2 K/UL
MONOCYTES NFR BLD: 22.4 %
NEUTROPHILS # BLD AUTO: 4.2 K/UL
NEUTROPHILS NFR BLD: 46.8 %
PLATELET # BLD AUTO: 141 K/UL
PMV BLD AUTO: 10 FL
POTASSIUM SERPL-SCNC: 4 MMOL/L
PROT SERPL-MCNC: 7.1 G/DL
RBC # BLD AUTO: 3.03 M/UL
SODIUM SERPL-SCNC: 136 MMOL/L
WBC # BLD AUTO: 8.87 K/UL

## 2018-05-16 PROCEDURE — 36415 COLL VENOUS BLD VENIPUNCTURE: CPT | Mod: PO

## 2018-05-16 PROCEDURE — 99499 UNLISTED E&M SERVICE: CPT | Mod: S$GLB,,, | Performed by: INTERNAL MEDICINE

## 2018-05-16 PROCEDURE — 99999 PR PBB SHADOW E&M-EST. PATIENT-LVL V: CPT | Mod: PBBFAC,,, | Performed by: INTERNAL MEDICINE

## 2018-05-16 PROCEDURE — 3078F DIAST BP <80 MM HG: CPT | Mod: CPTII,S$GLB,, | Performed by: INTERNAL MEDICINE

## 2018-05-16 PROCEDURE — 85025 COMPLETE CBC W/AUTO DIFF WBC: CPT | Mod: PO

## 2018-05-16 PROCEDURE — 99215 OFFICE O/P EST HI 40 MIN: CPT | Mod: S$GLB,,, | Performed by: INTERNAL MEDICINE

## 2018-05-16 PROCEDURE — 3074F SYST BP LT 130 MM HG: CPT | Mod: CPTII,S$GLB,, | Performed by: INTERNAL MEDICINE

## 2018-05-16 PROCEDURE — 80053 COMPREHEN METABOLIC PANEL: CPT | Mod: PO

## 2018-05-16 NOTE — PROGRESS NOTES
Subjective:       Patient ID: Lorne Roe is a 83 y.o. male.    Chief Complaint: Follow-up    HPI  This is a 81 year-old  gentleman who comes today for follow up of his multiple. myeloma.  He is   known to us since 11/2008 because of mild anemia. Work up at that time showed vitamin B12 in the lower limits of normal. He was given vitamin B12 supplements once a month with improvement. In addition, a serum protein electrophoresis was reported as positive showing an IgA monoclonal spike of 1.18 g. Eventually it sisi to 1.3 g. He had x-rays of the skull and other bones all of which were negative. His UPEP was negative for a monoclonal spike. His chem 20 as normal and has remained with normal calcium, protein and creatinine tthorugh the years. He had a bone marrow aspiration and biopsy on 05/03/2010 because of his increase in the spike of serum protein electrophoresis. 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    Serum free light chains in 07/2009 were reported as increased mostly at the expense of the lambda chains. There were measured at 7.13. We felt that based on the absence of lytic lesions, hypercalcemia, renal failure or significant anemia, he had at best a smoldering myeloma that we could follow expectantly. \He has chronic back pain of the spine    Imaging studies on 03/17/2011 ordered by orthopedics showed some disk disease and degenerative joint disease but no evidence of myeloma.    H A repeat MRI of the LS spine on 8/2014 showed DJD changes and no obvious evidence of myeloma.  Back pain continues about the same     He has chronic SOB for which he follows up with dr Szymanski and is on home 02.  His lab test in October 2016 show anemia of  10.8  calcium is normal, total Serum protein WAS 10.  SPEp showed a monoclonal spike of 4.82 gr with lambda free light chains at 108 hE had a small monoclonal pspike represetning 12% of the protein in the urine  He was asked to  have a bone marrow. He was diagnosed as having active myeloma.> Biopsy was done at the [facility]. Report is in the MR MRdia section of EPIC.  Cytogenetics were ported as follows: 46XY, alonso(3), t(3;3), (P25, q21) , (15/46 XY (5)     He also had a positive T cell gene rearangement which raised the possibility of a concomitant T cell disorder. It was felt ,yeloma was the leading issue, and to treat him for it.   He s responded nicely to the initial treatment and was placed  on maintenance revlimid 10 mg po d1-21 q28 days.   His primary care is now at [facility].  He gets b12 Monthly injections at our office He remains SOb on around the clock 02  During the last visit he was found to have a hgb of  9.4  The revlimid was held and he was asked to see me in a week.  His SPEP is suppressed but his [facility] ahs dropped to 7.7  He received a unit of packed cells and his revlimid was held..  Eventually he wa palced back on relimid at 5 mg day-21 then 7days off. He comes to recheck his counts    He came in or his routine appointment. The niece said he had been having diarrhea and a runny nose.  He was being wheeled in into the room when he had a near syncopal episode  The eyes went blank, and was briefly unresponsive    ALLERGIES: see med card  MEDICATIONS: See Med Card.  PAST MEDICAL HISTORY:  1. Multiple myeloma.        2. Obesity.  3. Chronic back pain with degenerative disk disease.  4. Coronary artery disease, status post bypass and coronary stents and  previous heart attacks.  5. High blood pressure.  6. Elevated cholesterol.  7. Peripheral vascular disease -- carotid stenosis.  8. History of leg edema.  9. Arthritis.  10.COPD  11.b12  deficiency  Review of Systems   Constitutional: Negative.  Negative for fatigue.   Eyes: Negative.    Cardiovascular: Negative.  Negative for chest pain.   Gastrointestinal: Negative for abdominal pain and nausea.   Genitourinary: Negative.  Negative for hematuria.   Musculoskeletal:  Negative.    Skin: Negative.    Neurological: Negative.    Psychiatric/Behavioral: Negative.        Objective:      Physical Exam   Constitutional: He is oriented to person, place, and time. He appears well-developed and well-nourished.   HENT:   Head: Normocephalic.   Mouth/Throat: No oropharyngeal exudate.   Eyes: Pupils are equal, round, and reactive to light.   Neck: No thyromegaly present.   Cardiovascular: Normal rate, regular rhythm and normal heart sounds.  Exam reveals no gallop.    No murmur heard.  Pulmonary/Chest: No respiratory distress. He has no wheezes. He has no rales.   Abdominal: Soft. Bowel sounds are normal. He exhibits no distension and no mass. There is no rebound and no guarding.   Musculoskeletal: Normal range of motion. He exhibits no edema.   Lymphadenopathy:     He has no cervical adenopathy.   Neurological: He is alert and oriented to person, place, and time.   Skin: Skin is warm and dry.   Psychiatric: He has a normal mood and affect. His behavior is normal.       Wt Readings from Last 3 Encounters:   05/16/18 85.9 kg (189 lb 6 oz)   03/21/18 87.8 kg (193 lb 9 oz)   03/19/18 86.8 kg (191 lb 5.8 oz)     Temp Readings from Last 3 Encounters:   05/16/18 97.9 °F (36.6 °C) (Oral)   04/18/18 98.5 °F (36.9 °C) (Oral)   03/19/18 97.9 °F (36.6 °C) (Oral)     BP Readings from Last 3 Encounters:   05/16/18 110/60   03/21/18 (!) 100/52   03/19/18 (!) 92/52     Pulse Readings from Last 3 Encounters:   05/16/18 78   04/18/18 72   03/21/18 77       Assessment:       1-Multiple myeloma  2-thrombocytoepnia  3-chrnic respiratory failure    Plan:       Lab Results   Component Value Date    WBC 8.87 05/16/2018    HGB 9.6 (L) 05/16/2018    HCT 32.1 (L) 05/16/2018     (H) 05/16/2018     (L) 05/16/2018     platelet count is down, most likely due to Revlimid.  patient looks chronically itt, is running a 02 sat. In 70'2.  He almost passed out on arrived. Will have him go to the ER at [facility] via  EMS    plateelt count is

## 2018-06-04 DIAGNOSIS — C90.01 MULTIPLE MYELOMA IN REMISSION: ICD-10-CM

## 2018-06-04 RX ORDER — LENALIDOMIDE 5 MG/1
CAPSULE ORAL
Qty: 21 EACH | Refills: 1 | Status: SHIPPED | OUTPATIENT
Start: 2018-06-04

## 2018-06-04 RX ORDER — LENALIDOMIDE 5 MG/1
CAPSULE ORAL
Qty: 21 EACH | Refills: 1 | Status: CANCELLED | OUTPATIENT
Start: 2018-06-04

## 2018-06-06 ENCOUNTER — PATIENT OUTREACH (OUTPATIENT)
Dept: ADMINISTRATIVE | Facility: HOSPITAL | Age: 83
End: 2018-06-06

## 2019-07-15 ENCOUNTER — PES CALL (OUTPATIENT)
Dept: ADMINISTRATIVE | Facility: CLINIC | Age: 84
End: 2019-07-15

## 2019-08-28 NOTE — SUBJECTIVE & OBJECTIVE
Interval History: looks much better, eating drinking well, walked with PT, sitting in chair, wife is very pleased with his progress. No FCCS. All Cx NGTD.    Review of Systems   Constitutional: Negative for appetite change and fatigue.   HENT: Negative for ear discharge, ear pain, rhinorrhea, sore throat and trouble swallowing.    Eyes: Negative for pain, redness and visual disturbance.   Respiratory: Negative for cough, shortness of breath and wheezing.    Cardiovascular: Negative for chest pain, palpitations and leg swelling.   Gastrointestinal: Negative for abdominal pain, constipation, diarrhea and vomiting.   Genitourinary: Negative for dysuria, flank pain, frequency and hematuria.   Musculoskeletal: Positive for back pain. Negative for arthralgias, gait problem and myalgias.   Skin: Negative for pallor, rash and wound.   Neurological: Positive for weakness. Negative for dizziness, light-headedness and headaches.   Psychiatric/Behavioral: Negative for confusion.     Objective:     Vital Signs (Most Recent):  Temp: 98.4 °F (36.9 °C) (03/19/17 1212)  Pulse: 73 (03/19/17 1552)  Resp: 18 (03/19/17 1552)  BP: (!) 107/56 (03/19/17 1212)  SpO2: 95 % (03/19/17 1552) Vital Signs (24h Range):  Temp:  [97.7 °F (36.5 °C)-98.6 °F (37 °C)] 98.4 °F (36.9 °C)  Pulse:  [68-92] 73  Resp:  [18-20] 18  SpO2:  [92 %-96 %] 95 %  BP: (103-146)/(53-62) 107/56     Weight: 92.4 kg (203 lb 9.6 oz)  Body mass index is 31.89 kg/(m^2).    Intake/Output Summary (Last 24 hours) at 03/19/17 1557  Last data filed at 03/19/17 0600   Gross per 24 hour   Intake             1050 ml   Output              660 ml   Net              390 ml      Physical Exam   Constitutional: He is oriented to person, place, and time. He appears well-developed and well-nourished. No distress.   Looks much better,    HENT:   Head: Normocephalic and atraumatic.   Nose: Nose normal.   Mouth/Throat: Oropharynx is clear and moist.   Eyes: Conjunctivae and EOM are normal.  Patient would like communication of their results via:        Cell Phone: [phone]  y to leave a message containing results?  Yes Pupils are equal, round, and reactive to light. No scleral icterus.   Neck: Normal range of motion. Neck supple.   Cardiovascular: Normal rate and regular rhythm.  Exam reveals no gallop and no friction rub.    Murmur heard.  Pulmonary/Chest: Effort normal. He has rhonchi in the right lower field.           Abdominal: Soft. Bowel sounds are normal.   obese   Musculoskeletal: Normal range of motion. He exhibits no edema or tenderness.   Lymphadenopathy:     He has no cervical adenopathy.   Neurological: He is alert and oriented to person, place, and time. He exhibits normal muscle tone.   Skin: Skin is warm and dry.   Psychiatric: He has a normal mood and affect. His behavior is normal.   Nursing note and vitals reviewed.      Significant Labs:   Blood Culture:   BMP:   Recent Labs  Lab 03/19/17  0631   *   *   K 4.2   CL 93*   CO2 30*   BUN 17   CREATININE 1.0   CALCIUM 9.3     CBC:   Recent Labs  Lab 03/18/17  0610 03/19/17  0631   WBC 5.41 3.10*   HGB 10.5* 10.5*   HCT 34.1* 33.1*    203     Urine Culture:   All pertinent labs within the past 24 hours have been reviewed.    Significant Imaging: I have reviewed all pertinent imaging results/findings within the past 24 hours.

## 2023-11-13 NOTE — PATIENT INSTRUCTIONS
Spironolactone Oral tablet  What is this medicine?  SPIRONOLACTONE (dhruv on [place] LAK tone) is a diuretic. It helps you make more urine and to lose excess water from your body. This medicine is used to treat high blood pressure, and edema or swelling from heart, kidney, or liver disease. It is also used to treat patients who make too much aldosterone or have low potassium.  This medicine may be used for other purposes; ask your health care provider or pharmacist if you have questions.  What should I tell my health care provider before I take this medicine?  They need to know if you have any of these conditions:  · high blood level of potassium  · kidney disease or trouble making urine  · liver disease  · an unusual or allergic reaction to spironolactone, other medicines, foods, dyes, or preservatives  · pregnant or trying to get pregnant  · breast-feeding  How should I use this medicine?  Take this medicine by mouth with a drink of water. Follow the directions on your prescription label. You can take it with or without food. If it upsets your stomach, take it with food. Do not take your medicine more often than directed. Remember that you will need to pass more urine after taking this medicine. Do not take your doses at a time of day that will cause you problems. Do not take at bedtime.  Talk to your pediatrician regarding the use of this medicine in children. While this drug may be prescribed for selected conditions, precautions do apply.  Overdosage: If you think you have taken too much of this medicine contact a poison control center or emergency room at once.  NOTE: This medicine is only for you. Do not share this medicine with others.  What if I miss a dose?  If you miss a dose, take it as soon as you can. If it is almost time for your next dose, take only that dose. Do not take double or extra doses.  What may interact with this medicine?  Do not take this medicine with any of the following  Please review encounter & close   medications:  · eplerenone  This medicine may also interact with the following medications:  · corticosteroids  · digoxin  · lithium  · medicines for high blood pressure like ACE inhibitors  · skeletal muscle relaxants like tubocurarine  · NSAIDs, medicines for pain and inflammation, like ibuprofen or naproxen  · potassium products like salt substitute or supplements  · pressor amines like norepinephrine  · some diuretics  This list may not describe all possible interactions. Give your health care provider a list of all the medicines, herbs, non-prescription drugs, or dietary supplements you use. Also tell them if you smoke, drink alcohol, or use illegal drugs. Some items may interact with your medicine.  What should I watch for while using this medicine?  Visit your doctor or health care professional for regular checks on your progress. Check your blood pressure as directed. Ask your doctor what your blood pressure should be, and when you should contact them.  You may need to be on a special diet while taking this medicine. Ask your doctor. Also, ask how many glasses of fluid you need to drink a day. You must not get dehydrated.  This medicine may make you feel confused, dizzy or lightheaded. Drinking alcohol and taking some medicines can make this worse. Do not drive, use machinery, or do anything that needs mental alertness until you know how this medicine affects you. Do not sit or stand up quickly.  What side effects may I notice from receiving this medicine?  Side effects that you should report to your doctor or health care professional as soon as possible:  · allergic reactions such as skin rash or itching, hives, swelling of the lips, mouth, tongue, or throat  · black or tarry stools  · fast, irregular heartbeat  · fever  · muscle pain, cramps  · numbness, tingling in hands or feet  · trouble breathing  · trouble passing urine  · unusual bleeding  · unusually weak or tired  Side effects that usually do not  require medical attention (report to your doctor or health care professional if they continue or are bothersome):  · change in voice or hair growth  · confusion  · dizzy, drowsy  · dry mouth, increased thirst  · enlarged or tender breasts  · headache  · irregular menstrual periods  · sexual difficulty, unable to have an erection  · stomach upset  This list may not describe all possible side effects. Call your doctor for medical advice about side effects. You may report side effects to FDA at 4-344-PTL-8654.  Where should I keep my medicine?  Keep out of the reach of children.  Store below 25 degrees C (77 degrees F). Throw away any unused medicine after the expiration date.  NOTE:This sheet is a summary. It may not cover all possible information. If you have questions about this medicine, talk to your doctor, pharmacist, or health care provider. Copyright© 2016 [employer]      Furosemide Oral tablet  What is this medicine?  FUROSEMIDE (fyoor [place] se mide) is a diuretic. It helps you make more urine and to lose salt and excess water from your body. This medicine is used to treat high blood pressure, and edema or swelling from heart, kidney, or liver disease.  This medicine may be used for other purposes; ask your health care provider or pharmacist if you have questions.  What should I tell my health care provider before I take this medicine?  They need to know if you have any of these conditions:  · abnormal blood electrolytes  · diarrhea or vomiting  · gout  · heart disease  · kidney disease, small amounts of urine, or difficulty passing urine  · liver disease  · thyroid disease  · an unusual or allergic reaction to furosemide, sulfa drugs, other medicines, foods, dyes, or preservatives  · pregnant or trying to get pregnant  · breast-feeding  How should I use this medicine?  Take this medicine by mouth with a glass of water. Follow the directions on the prescription label. You may take this medicine with or without  food. If it upsets your stomach, take it with food or milk. Do not take your medicine more often than directed. Remember that you will need to pass more urine after taking this medicine. Do not take your medicine at a time of day that will cause you problems. Do not take at bedtime.  Talk to your pediatrician regarding the use of this medicine in children. While this drug may be prescribed for selected conditions, precautions do apply.  Overdosage: If you think you have taken too much of this medicine contact a poison control center or emergency room at once.  NOTE: This medicine is only for you. Do not share this medicine with others.  What if I miss a dose?  If you miss a dose, take it as soon as you can. If it is almost time for your next dose, take only that dose. Do not take double or extra doses.  What may interact with this medicine?  · aspirin and aspirin-like medicines  · certain antibiotics  · chloral hydrate  · cisplatin  · cyclosporine  · digoxin  · diuretics  · laxatives  · lithium  · medicines for blood pressure  · medicines that relax muscles for surgery  · methotrexate  · NSAIDs, medicines for pain and inflammation like ibuprofen, naproxen, or indomethacin  · phenytoin  · steroid medicines like prednisone or cortisone  · sucralfate  · thyroid hormones  This list may not describe all possible interactions. Give your health care provider a list of all the medicines, herbs, non-prescription drugs, or dietary supplements you use. Also tell them if you smoke, drink alcohol, or use illegal drugs. Some items may interact with your medicine.  What should I watch for while using this medicine?  Visit your doctor or health care professional for regular checks on your progress. Check your blood pressure regularly. Ask your doctor or health care professional what your blood pressure should be, and when you should contact him or her. If you are a diabetic, check your blood sugar as directed.  You may need to be  on a special diet while taking this medicine. Check with your doctor. Also, ask how many glasses of fluid you need to drink a day. You must not get dehydrated.  You may get drowsy or dizzy. Do not drive, use machinery, or do anything that needs mental alertness until you know how this drug affects you. Do not stand or sit up quickly, especially if you are an older patient. This reduces the risk of dizzy or fainting spells. Alcohol can make you more drowsy and dizzy. Avoid alcoholic drinks.  This medicine can make you more sensitive to the sun. Keep out of the sun. If you cannot avoid being in the sun, wear protective clothing and use sunscreen. Do not use sun lamps or tanning beds/booths.  What side effects may I notice from receiving this medicine?  Side effects that you should report to your doctor or health care professional as soon as possible:  · blood in urine or stools  · dry mouth  · fever or chills  · hearing loss or ringing in the ears  · irregular heartbeat  · muscle pain or weakness, cramps  · skin rash  · stomach upset, pain, or nausea  · tingling or numbness in the hands or feet  · unusually weak or tired  · vomiting or diarrhea  · yellowing of the eyes or skin  Side effects that usually do not require medical attention (report to your doctor or health care professional if they continue or are bothersome):  · headache  · loss of appetite  · unusual bleeding or bruising  This list may not describe all possible side effects. Call your doctor for medical advice about side effects. You may report side effects to FDA at 4-976-JDF-5815.  Where should I keep my medicine?  Keep out of the reach of children.  Store at room temperature between 15 and 30 degrees C (59 and 86 degrees F). Protect from light. Throw away any unused medicine after the expiration date.  NOTE:This sheet is a summary. It may not cover all possible information. If you have questions about this medicine, talk to your doctor, pharmacist, or  health care provider. Copyright© 2016 [employer]

## 2023-11-29 NOTE — PT/OT/SLP EVAL
Occupational Therapy  Evaluation    Lorne Roe   MRN: [id number]   Admitting Diagnosis: Acute on chronic respiratory failure    OT Date of Treatment: 03/19/17   OT Start Time: 1035  OT Stop Time: 1100  OT Total Time (min): 25 min    Billable Minutes:  Evaluation  X 15 MIN  Self Care/Home Management  X 10 MIN    Diagnosis: Acute on chronic respiratory failure       Past Medical History:   Diagnosis Date    B12 deficiency anemia     COPD (chronic obstructive pulmonary disease)     Coronary artery disease     [facility]    Depression     Hypertension     Multiple myeloma     Osteopenia     Paraproteinemia     Dr alvarado    PVD (peripheral vascular disease)     Dr Argueta    Vitamin D deficiency disease       Past Surgical History:   Procedure Laterality Date    BACK SURGERY      CAROTID ENDARTERECTOMY Left 12/17/13    left    CORONARY ANGIOPLASTY WITH STENT PLACEMENT      CORONARY ARTERY BYPASS GRAFT      stent/guidand multi-link duet-safe to 1.5T    NECK SURGERY         Referring physician:   Date referred to OT:     General Precautions: Standard,    Orthopedic Precautions: N/A  Braces: N/A          Patient History:  Living Environment  Lives With: spouse  Living Arrangements: mobile home  Home Accessibility: stairs to enter home  Home Layout: Able to live on 1st floor  Number of Stairs to Enter Home: 5  Stair Railings at Home: outside, present at both sides  Transportation Available: family or friend will provide  Living Environment Comment: PT LIVES WITH WIFE AND 2 NIECES, HAS ACCESS TO 24 HOUR CARE BETWEEN ALL FAMIY MEMBER, FUNCTIONALLY INDEP BUT OCCASSIONALLY NEED ASSISTANCE MAINLY WITH LE DRESSING DUE TO BODY HIATUS  Equipment Currently Used at Home: bedside commode, shower chair, oxygen, CPAP, hospital bed, rollator    Prior level of function:   Bed Mobility/Transfers: independent  Grooming: needs device  Bathing: independent  Upper Body Dressing:  Ok for referral? Referral is prepped   independent  Lower Body Dressing: independent  Toileting: independent        Dominant hand: right    Subjective:  Communicated with pt, and nurse- Catalino prior to session.    Chief Complaint: none  Patient/Family stated goals: to return home    Pain Ratin/10                   Objective:       Cognitive Exam:  Oriented to: Person, Place, Time and Situation  Follows Commands/attention: Follows one-step commands  Communication: clear/fluent  Memory:  No Deficits noted  Safety awareness/insight to disability: intact  Coping skills/emotional control: Appropriate to situation    Visual/perceptual:  Intact    Physical Exam:  Postural examination/scapula alignment: Rounded shoulder and Head forward  Skin integrity: Visible skin intact  Edema: None noted BUE    Sensation:   Intact    Upper Extremity Range of Motion:  Right Upper Extremity: WNL  Left Upper Extremity: WNL    Upper Extremity Strength:  Right Upper Extremity: WFL  Left Upper Extremity: WFL   Strength: WFL    Fine motor coordination:   Intact    Gross motor coordination: WFL    Functional Mobility:  Bed Mobility:  Rolling/Turning to Left: Modified independent  Rolling/Turning Right: Modified independent  Scooting/Bridging: Modified Independent  Supine to Sit: Modified Independent    Transfers:  Sit <> Stand Assistance: Supervision  Sit <> Stand Assistive Device: Rolling Walker  Bed <> Chair Technique: Stand Pivot  Bed <> Chair Transfer Assistance: Supervision  Bed <> Chair Assistive Device: Rolling Walker  Toilet Transfer Technique: Stand Pivot  Toilet Transfer Assistance: Supervision  Toilet Transfer Assistive Device: Rolling Walker    Functional Ambulation: fx ambulation with RW, O2 at 3 lit in tow in room and hallway with SPV for safety, no LOB, exhibits SOB.     Activities of Daily Living:     Feeding adaptive equipment:   UE Dressing Level of Assistance: Set-up Assistance  UE adaptive equipment:   LE Dressing Level of Assistance: Moderate assistance  "(PT REPORTS AT TIMES HE NEEDS ASSISTANCE )  LE adaptive equipment:                     Bathing adaptive equipment:     Balance:   Static Sit: GOOD+: Takes MAXIMAL challenges from all directions.    Dynamic Sit: GOOD: Maintains balance through MODERATE excursions of active trunk movement  Static Stand: GOOD: Takes MODERATE challenges from all directions  Dynamic stand: GOOD-: Needs SUPERVISION only during gait and able to self right with moderate     Therapeutic Activities and Exercises:      AM-PAC 6 CLICK ADL  How much help from another person does this patient currently need?  1 = Unable, Total/Dependent Assistance  2 = A lot, Maximum/Moderate Assistance  3 = A little, Minimum/Contact Guard/Supervision  4 = None, Modified [place]/Independent         AM-PAC Raw Score CMS "G-Code Modifier Level of Impairment Assistance   6 % Total / Unable   7 - 9 CM 80 - 100% Maximal Assist   10 - 14 CL 60 - 80% Moderate Assist   15 - 19 CK 40 - 60% Moderate Assist   20 - 22 CJ 20 - 40% Minimal Assist   23 CI 1-20% SBA / CGA   24 CH 0% Independent/ Mod I       Patient left up in chair with all lines intact, call button in reach and nurse- Catalino notified    Assessment:  Lorne Roe is a 82 y.o. male with a medical diagnosis of Acute on chronic respiratory failure .        Discharge recommendations: Discharge Facility/Level Of Care Needs: home health OT (PT REPORTS HE WAS RECEIVING HHOT PRIOR TO ADMIT, TO BE  RESUME AFTER D/C)     Barriers to discharge: Barriers to Discharge: None    Equipment recommendations: none     GOALS:   Occupational Therapy Goals     Not on file          PLAN:  No skilled OT services warranted at this time due to pt is performing at max functional level with ADL's and fx mobility.   Plan of Care expires:    Plan of Care reviewed with: patient    OT G-codes  Functional Assessment Tool Used: fim-adl  Score: 3  Functional Limitation: Self care  Self Care Current Status (): CL  Self Care Goal " Status (): CL  Self Care Discharge Status (): CL    Chandler Santana OT  03/19/2017

## 2024-12-23 NOTE — PLAN OF CARE
----- Message from David sent at 12/23/2024  2:00 PM CST -----  Patient is scheduled with Dr. Luong on 01/15/25 and is added to the wait list. August (son) is asking the patient be seen sometime this week. August can be reached at [phone].   Problem: Patient Care Overview  Goal: Plan of Care Review  Outcome: Ongoing (interventions implemented as appropriate)  Pt continue on antibiotic, d/c'd vancomycin, chest x-ray, continue PT/OT, ambulate with assistance, 3 LPM per nasal cannula.